# Patient Record
Sex: MALE | Race: BLACK OR AFRICAN AMERICAN | Employment: UNEMPLOYED | URBAN - METROPOLITAN AREA
[De-identification: names, ages, dates, MRNs, and addresses within clinical notes are randomized per-mention and may not be internally consistent; named-entity substitution may affect disease eponyms.]

---

## 2017-03-02 ENCOUNTER — HOSPITAL ENCOUNTER (EMERGENCY)
Age: 57
Discharge: HOME | End: 2017-03-02
Attending: EMERGENCY MEDICINE
Payer: COMMERCIAL

## 2017-03-02 ENCOUNTER — APPOINTMENT (OUTPATIENT)
Dept: GENERAL RADIOLOGY | Age: 57
End: 2017-03-02
Attending: EMERGENCY MEDICINE
Payer: COMMERCIAL

## 2017-03-02 VITALS
SYSTOLIC BLOOD PRESSURE: 135 MMHG | HEIGHT: 73 IN | RESPIRATION RATE: 18 BRPM | OXYGEN SATURATION: 97 % | WEIGHT: 213 LBS | BODY MASS INDEX: 28.23 KG/M2 | TEMPERATURE: 98.5 F | DIASTOLIC BLOOD PRESSURE: 78 MMHG | HEART RATE: 78 BPM

## 2017-03-02 DIAGNOSIS — K29.90 GASTRITIS AND DUODENITIS: Primary | ICD-10-CM

## 2017-03-02 LAB
ALBUMIN SERPL BCP-MCNC: 3.6 G/DL (ref 3.5–5)
ALBUMIN/GLOB SERPL: 0.9 {RATIO} (ref 1.2–3.5)
ALP SERPL-CCNC: 63 U/L (ref 50–136)
ALT SERPL-CCNC: 36 U/L (ref 12–65)
ANION GAP BLD CALC-SCNC: 9 MMOL/L (ref 7–16)
AST SERPL W P-5'-P-CCNC: 28 U/L (ref 15–37)
BASOPHILS # BLD AUTO: 0 K/UL (ref 0–0.2)
BASOPHILS # BLD: 0 % (ref 0–2)
BILIRUB SERPL-MCNC: 0.5 MG/DL (ref 0.2–1.1)
BNP SERPL-MCNC: 7 PG/ML
BUN SERPL-MCNC: 22 MG/DL (ref 6–23)
CALCIUM SERPL-MCNC: 9.1 MG/DL (ref 8.3–10.4)
CHLORIDE SERPL-SCNC: 107 MMOL/L (ref 98–107)
CO2 SERPL-SCNC: 28 MMOL/L (ref 21–32)
CREAT SERPL-MCNC: 1.45 MG/DL (ref 0.8–1.5)
DIFFERENTIAL METHOD BLD: ABNORMAL
EOSINOPHIL # BLD: 0.2 K/UL (ref 0–0.8)
EOSINOPHIL NFR BLD: 2 % (ref 0.5–7.8)
ERYTHROCYTE [DISTWIDTH] IN BLOOD BY AUTOMATED COUNT: 14.2 % (ref 11.9–14.6)
GLOBULIN SER CALC-MCNC: 4.1 G/DL (ref 2.3–3.5)
GLUCOSE SERPL-MCNC: 84 MG/DL (ref 65–100)
HCT VFR BLD AUTO: 40.2 % (ref 41.1–50.3)
HGB BLD-MCNC: 13.8 G/DL (ref 13.6–17.2)
IMM GRANULOCYTES # BLD: 0 K/UL (ref 0–0.5)
IMM GRANULOCYTES NFR BLD AUTO: 0.3 % (ref 0–5)
LIPASE SERPL-CCNC: 168 U/L (ref 73–393)
LYMPHOCYTES # BLD AUTO: 38 % (ref 13–44)
LYMPHOCYTES # BLD: 2.6 K/UL (ref 0.5–4.6)
MCH RBC QN AUTO: 29.7 PG (ref 26.1–32.9)
MCHC RBC AUTO-ENTMCNC: 34.3 G/DL (ref 31.4–35)
MCV RBC AUTO: 86.5 FL (ref 79.6–97.8)
MONOCYTES # BLD: 0.5 K/UL (ref 0.1–1.3)
MONOCYTES NFR BLD AUTO: 8 % (ref 4–12)
NEUTS SEG # BLD: 3.6 K/UL (ref 1.7–8.2)
NEUTS SEG NFR BLD AUTO: 52 % (ref 43–78)
PLATELET # BLD AUTO: 248 K/UL (ref 150–450)
PMV BLD AUTO: 9.5 FL (ref 10.8–14.1)
POTASSIUM SERPL-SCNC: 3.4 MMOL/L (ref 3.5–5.1)
PROT SERPL-MCNC: 7.7 G/DL (ref 6.3–8.2)
RBC # BLD AUTO: 4.65 M/UL (ref 4.23–5.67)
SODIUM SERPL-SCNC: 144 MMOL/L (ref 136–145)
TROPONIN I BLD-MCNC: 0 NG/ML (ref 0–0.08)
TROPONIN I SERPL-MCNC: <0.02 NG/ML (ref 0.02–0.05)
WBC # BLD AUTO: 6.9 K/UL (ref 4.3–11.1)

## 2017-03-02 PROCEDURE — 83880 ASSAY OF NATRIURETIC PEPTIDE: CPT | Performed by: EMERGENCY MEDICINE

## 2017-03-02 PROCEDURE — 85025 COMPLETE CBC W/AUTO DIFF WBC: CPT | Performed by: EMERGENCY MEDICINE

## 2017-03-02 PROCEDURE — 99285 EMERGENCY DEPT VISIT HI MDM: CPT | Performed by: EMERGENCY MEDICINE

## 2017-03-02 PROCEDURE — 71020 XR CHEST PA LAT: CPT

## 2017-03-02 PROCEDURE — 84484 ASSAY OF TROPONIN QUANT: CPT | Performed by: EMERGENCY MEDICINE

## 2017-03-02 PROCEDURE — 74011000250 HC RX REV CODE- 250: Performed by: EMERGENCY MEDICINE

## 2017-03-02 PROCEDURE — 74011250637 HC RX REV CODE- 250/637: Performed by: EMERGENCY MEDICINE

## 2017-03-02 PROCEDURE — 93005 ELECTROCARDIOGRAM TRACING: CPT | Performed by: EMERGENCY MEDICINE

## 2017-03-02 PROCEDURE — 80053 COMPREHEN METABOLIC PANEL: CPT | Performed by: EMERGENCY MEDICINE

## 2017-03-02 PROCEDURE — 83690 ASSAY OF LIPASE: CPT | Performed by: EMERGENCY MEDICINE

## 2017-03-02 RX ORDER — PANTOPRAZOLE SODIUM 40 MG/1
40 TABLET, DELAYED RELEASE ORAL DAILY
Qty: 20 TAB | Refills: 0 | Status: SHIPPED | OUTPATIENT
Start: 2017-03-02 | End: 2017-03-13 | Stop reason: SDUPTHER

## 2017-03-02 RX ORDER — GUAIFENESIN 100 MG/5ML
324 LIQUID (ML) ORAL
Status: COMPLETED | OUTPATIENT
Start: 2017-03-02 | End: 2017-03-02

## 2017-03-02 RX ORDER — LIDOCAINE HYDROCHLORIDE 20 MG/ML
15 SOLUTION OROPHARYNGEAL
Status: COMPLETED | OUTPATIENT
Start: 2017-03-02 | End: 2017-03-02

## 2017-03-02 RX ADMIN — ASPIRIN 81 MG 324 MG: 81 TABLET ORAL at 19:20

## 2017-03-02 RX ADMIN — Medication 30 ML: at 19:15

## 2017-03-02 RX ADMIN — LIDOCAINE HYDROCHLORIDE 15 ML: 20 SOLUTION ORAL; TOPICAL at 19:15

## 2017-03-02 NOTE — ED TRIAGE NOTES
Pt reports onset of chest pain was last Saturday night. Pt describes pain as a gas like pressure. Pt has not taken any nitro or asa today.

## 2017-03-03 LAB
ATRIAL RATE: 76 BPM
CALCULATED P AXIS, ECG09: 55 DEGREES
CALCULATED R AXIS, ECG10: 74 DEGREES
CALCULATED T AXIS, ECG11: 69 DEGREES
DIAGNOSIS, 93000: NORMAL
DIASTOLIC BP, ECG02: NORMAL MMHG
P-R INTERVAL, ECG05: 150 MS
Q-T INTERVAL, ECG07: 380 MS
QRS DURATION, ECG06: 100 MS
QTC CALCULATION (BEZET), ECG08: 427 MS
SYSTOLIC BP, ECG01: NORMAL MMHG
VENTRICULAR RATE, ECG03: 76 BPM

## 2017-03-03 NOTE — ED PROVIDER NOTES
HPI Comments: Patient with prior drug abuse and history of high blood pressure and congestive heart failure. Takes Lasix. Presents with epigastric pain since Saturday. Off and on but constant since this morning at 8:30. Worse at 11 and is eased up since then. No shortness of breath nausea numbness or diaphoresis. Patient is a 55 y.o. male presenting with chest pain. The history is provided by the patient. No  was used. Chest Pain (Angina)    This is a new problem. The current episode started more than 2 days ago. The problem has not changed since onset. The problem occurs constantly. The pain is associated with normal activity. The pain is present in the epigastric region. The pain is mild. The quality of the pain is described as pressure-like, tightness and dull. The pain does not radiate. Associated symptoms include abdominal pain. Pertinent negatives include no back pain, no cough, no diaphoresis, no dizziness, no exertional chest pressure, no fever, no headaches, no irregular heartbeat, no leg pain, no lower extremity edema, no malaise/fatigue, no nausea, no near-syncope, no numbness, no palpitations, no shortness of breath, no vomiting and no weakness. He has tried nothing for the symptoms. Risk factors include hypertension. His past medical history is significant for HTN. Past Medical History:   Diagnosis Date    CHF (congestive heart failure) ([facility] [address] 7/20/2014    with acute/chronic systolic CHF in face of malignant hypertension and polysubstance abuse to include alcohol, cocaine    CHF (congestive heart failure) ([facility] [address] 2/20/15    echo: LVEF 50-55%, mild to mod . MR    Chronic kidney disease     stage III per cardiology ov note 2/25/15 (cr. 1.5)    Diverticulosis of large intestine without diverticulitis 2016    History of alcohol abuse 7/22/2014    History of drug abuse 7/22/14    History of echocardiogram echo: 2/20/15    LVEF 50-55%, mild to mod.  MR, mild to moderate concentric LV hypertrophy.  Hx of colonic polyps 2016    Hypertension        Past Surgical History:   Procedure Laterality Date    HX COLONOSCOPY  last 2/8/16    Reina--five small sigmoid polyps--5-7 year recall         Family History:   Problem Relation Age of Onset    Alcohol abuse Mother     Heart Attack Other     Heart Disease Brother 54       Social History     Social History    Marital status:      Spouse name: N/A    Number of children: N/A    Years of education: N/A     Occupational History    Not on file. Social History Main Topics    Smoking status: Never Smoker    Smokeless tobacco: Never Used    Alcohol use No      Comment:  has not drank alcohol since July 2014    Drug use: Yes     Special: Cocaine      Comment: not used cocaine since July 2014    Sexual activity: Not on file     Other Topics Concern    Not on file     Social History Narrative         ALLERGIES: Review of patient's allergies indicates no known allergies. Review of Systems   Constitutional: Negative for chills, diaphoresis, fever and malaise/fatigue. HENT: Negative for rhinorrhea and sore throat. Eyes: Negative for pain and redness. Respiratory: Negative for cough, chest tightness, shortness of breath and wheezing. Cardiovascular: Positive for chest pain. Negative for palpitations, leg swelling and near-syncope. Gastrointestinal: Positive for abdominal pain. Negative for diarrhea, nausea and vomiting. Genitourinary: Negative for dysuria and hematuria. Musculoskeletal: Negative for back pain, gait problem, neck pain and neck stiffness. Skin: Negative for color change and rash. Neurological: Negative for dizziness, weakness, numbness and headaches. Vitals:    03/02/17 1606   BP: 119/80   Pulse: 80   Resp: 18   SpO2: 97%   Weight: 96.6 kg (213 lb)   Height: 6' 1\" (1.854 m)            Physical Exam   Constitutional: He is oriented to person, place, and time.  He appears well-developed and well-nourished. No distress. HENT:   Head: Normocephalic and atraumatic. Neck: Normal range of motion. Neck supple. Cardiovascular: Normal rate and regular rhythm. No murmur heard. Pulmonary/Chest: Effort normal and breath sounds normal. He has no wheezes. He exhibits no tenderness. Abdominal: Soft. Bowel sounds are normal. There is no tenderness. Musculoskeletal: Normal range of motion. He exhibits no edema. Neurological: He is alert and oriented to person, place, and time. Skin: Skin is warm and dry. Nursing note and vitals reviewed. MDM  Number of Diagnoses or Management Options  Diagnosis management comments: Pt feels better with meds. Will discharge. Amount and/or Complexity of Data Reviewed  Clinical lab tests: ordered and reviewed  Tests in the radiology section of CPT®: ordered and reviewed  Tests in the medicine section of CPT®: ordered and reviewed    Patient Progress  Patient progress: stable    ED Course       Procedures    EKG: normal sinus rhythm, nonspecific ST and T waves changes. Rate 76. XR CHEST PA LAT (Final result) Result time: 03/02/17 17:22:11     Final result by Rosalba Wilkinson DO (03/02/17 17:22:11)     Impression:     Impression: No active disease in the chest.           Narrative:     Two view chest:  03/02/2017    History: Chest pains intermittently x5 days. Comparison: 07/20/2014    Findings: The heart and mediastinal silhouette are normal in size and  configuration. The lungs and pleural spaces are clear. The pulmonary vascularity  is within normal limits.  The visualized osseous structures are unremarkable.                     Results Include:    Recent Results (from the past 24 hour(s))   EKG, 12 LEAD, INITIAL    Collection Time: 03/02/17  4:13 PM   Result Value Ref Range    Systolic BP  mmHg    Diastolic BP  mmHg    Ventricular Rate 76 BPM    Atrial Rate 76 BPM    P-R Interval 150 ms    QRS Duration 100 ms    Q-T Interval 380 ms    QTC Calculation (Bezet) 427 ms    Calculated P Axis 55 degrees    Calculated R Axis 74 degrees    Calculated T Axis 69 degrees    Diagnosis       Normal sinus rhythm  Normal ECG  When compared with ECG of 20-JUL-2014 19:12,  Non-specific change in ST segment in Anterior leads  Nonspecific T wave abnormality no longer evident in Anterolateral leads  QT has shortened     CBC WITH AUTOMATED DIFF    Collection Time: 03/02/17  4:20 PM   Result Value Ref Range    WBC 6.9 4.3 - 11.1 K/uL    RBC 4.65 4.23 - 5.67 M/uL    HGB 13.8 13.6 - 17.2 g/dL    HCT 40.2 (L) 41.1 - 50.3 %    MCV 86.5 79.6 - 97.8 FL    MCH 29.7 26.1 - 32.9 PG    MCHC 34.3 31.4 - 35.0 g/dL    RDW 14.2 11.9 - 14.6 %    PLATELET 952 954 - 935 K/uL    MPV 9.5 (L) 10.8 - 14.1 FL    DF AUTOMATED      NEUTROPHILS 52 43 - 78 %    LYMPHOCYTES 38 13 - 44 %    MONOCYTES 8 4.0 - 12.0 %    EOSINOPHILS 2 0.5 - 7.8 %    BASOPHILS 0 0.0 - 2.0 %    IMMATURE GRANULOCYTES 0.3 0.0 - 5.0 %    ABS. NEUTROPHILS 3.6 1.7 - 8.2 K/UL    ABS. LYMPHOCYTES 2.6 0.5 - 4.6 K/UL    ABS. MONOCYTES 0.5 0.1 - 1.3 K/UL    ABS. EOSINOPHILS 0.2 0.0 - 0.8 K/UL    ABS. BASOPHILS 0.0 0.0 - 0.2 K/UL    ABS. IMM. GRANS. 0.0 0.0 - 0.5 K/UL   METABOLIC PANEL, COMPREHENSIVE    Collection Time: 03/02/17  4:20 PM   Result Value Ref Range    Sodium 144 136 - 145 mmol/L    Potassium 3.4 (L) 3.5 - 5.1 mmol/L    Chloride 107 98 - 107 mmol/L    CO2 28 21 - 32 mmol/L    Anion gap 9 7 - 16 mmol/L    Glucose 84 65 - 100 mg/dL    BUN 22 6 - 23 MG/DL    Creatinine 1.45 0.8 - 1.5 MG/DL    GFR est AA >60 >60 ml/min/1.73m2    GFR est non-AA 54 (L) >60 ml/min/1.73m2    Calcium 9.1 8.3 - 10.4 MG/DL    Bilirubin, total 0.5 0.2 - 1.1 MG/DL    ALT (SGPT) 36 12 - 65 U/L    AST (SGOT) 28 15 - 37 U/L    Alk.  phosphatase 63 50 - 136 U/L    Protein, total 7.7 6.3 - 8.2 g/dL    Albumin 3.6 3.5 - 5.0 g/dL    Globulin 4.1 (H) 2.3 - 3.5 g/dL    A-G Ratio 0.9 (L) 1.2 - 3.5     TROPONIN I    Collection Time: 03/02/17  4:20 PM   Result Value Ref Range    Troponin-I, Qt. <0.02 (L) 0.02 - 0.05 NG/ML   BNP    Collection Time: 03/02/17  4:20 PM   Result Value Ref Range    BNP 7 pg/mL   LIPASE    Collection Time: 03/02/17  4:20 PM   Result Value Ref Range    Lipase 168 73 - 393 U/L   POC TROPONIN-I    Collection Time: 03/02/17  7:35 PM   Result Value Ref Range    Troponin-I (POC) 0 0.0 - 0.08 ng/ml

## 2017-03-03 NOTE — DISCHARGE INSTRUCTIONS
Gastritis: Care Instructions  Your Care Instructions    Gastritis is a sore and upset stomach. It happens when something irritates the stomach lining. Many things can cause it. These include an infection such as the flu or something you ate or drank. Medicines or a sore on the lining of the stomach (ulcer) also can cause it. Your belly may bloat and ache. You may belch, vomit, and feel sick to your stomach. You should be able to relieve the problem by taking medicine. And it may help to change your diet. If gastritis lasts, your doctor may prescribe medicine. Follow-up care is a key part of your treatment and safety. Be sure to make and go to all appointments, and call your doctor if you are having problems. It's also a good idea to know your test results and keep a list of the medicines you take. How can you care for yourself at home? · If your doctor prescribed antibiotics, take them as directed. Do not stop taking them just because you feel better. You need to take the full course of antibiotics. · Be safe with medicines. If your doctor prescribed medicine to decrease stomach acid, take it as directed. Call your doctor if you think you are having a problem with your medicine. · Do not take any other medicine, including over-the-counter pain relievers, without talking to your doctor first.  · If your doctor recommends over-the-counter medicine to reduce stomach acid, such as Pepcid AC, Prilosec, Tagamet HB, or Zantac 75, follow the directions on the label. · Drink plenty of fluids (enough so that your urine is light yellow or clear like water) to prevent dehydration. Choose water and other caffeine-free clear liquids. If you have kidney, heart, or liver disease and have to limit fluids, talk with your doctor before you increase the amount of fluids you drink. · Limit how much alcohol you drink. · Avoid coffee, tea, cola drinks, chocolate, and other foods with caffeine.  They increase stomach acid.  When should you call for help? Call 911 anytime you think you may need emergency care. For example, call if:  · You vomit blood or what looks like coffee grounds. · You pass maroon or very bloody stools. Call your doctor now or seek immediate medical care if:  · You start breathing fast and have not produced urine in the last 8 hours. · You cannot keep fluids down. Watch closely for changes in your health, and be sure to contact your doctor if:  · You do not get better as expected. Where can you learn more? Go to http://yolanda-gwyn.info/. Enter [id number] in the search box to learn more about \"Gastritis: Care Instructions. \"  Current as of: August 9, 2016  Content Version: 11.1  © 3155-8604 [employer]. Care instructions adapted under license by [employer] (which disclaims liability or warranty for this information). If you have questions about a medical condition or this instruction, always ask your healthcare professional. [address] any warranty or liability for your use of this information.

## 2017-03-03 NOTE — ED NOTES
Pt discharged instructions given, pt v\u to instructions. Pt given 1 rx and medication was explained to the pt, who v\u. Pt ambulatory on discharge in no acute distress.

## 2017-10-09 ENCOUNTER — HOSPITAL ENCOUNTER (OUTPATIENT)
Dept: GENERAL RADIOLOGY | Age: 57
Discharge: HOME | End: 2017-10-09
Attending: FAMILY MEDICINE
Payer: COMMERCIAL

## 2017-10-09 DIAGNOSIS — M79.671 RIGHT FOOT PAIN: ICD-10-CM

## 2017-10-09 PROBLEM — Z98.890 S/P COLONOSCOPY: Chronic | Status: ACTIVE | Noted: 2017-10-09

## 2017-10-09 PROBLEM — Z98.890 S/P COLONOSCOPY: Status: ACTIVE | Noted: 2017-10-09

## 2017-10-09 PROCEDURE — 73630 X-RAY EXAM OF FOOT: CPT

## 2017-10-09 PROCEDURE — 73610 X-RAY EXAM OF ANKLE: CPT

## 2017-10-09 NOTE — PROGRESS NOTES
Please let patient know his X-rays of his foot show some arthritic type changes but no broken bones and no dislocation of the bones.

## 2018-02-27 ENCOUNTER — HOSPITAL ENCOUNTER (EMERGENCY)
Age: 58
Discharge: HOME | End: 2018-02-28
Attending: EMERGENCY MEDICINE
Payer: COMMERCIAL

## 2018-02-27 ENCOUNTER — APPOINTMENT (OUTPATIENT)
Dept: GENERAL RADIOLOGY | Age: 58
End: 2018-02-27
Payer: COMMERCIAL

## 2018-02-27 VITALS
BODY MASS INDEX: 27.36 KG/M2 | TEMPERATURE: 98.8 F | HEIGHT: 72 IN | DIASTOLIC BLOOD PRESSURE: 86 MMHG | SYSTOLIC BLOOD PRESSURE: 135 MMHG | OXYGEN SATURATION: 98 % | HEART RATE: 73 BPM | WEIGHT: 202 LBS | RESPIRATION RATE: 16 BRPM

## 2018-02-27 DIAGNOSIS — M25.571 ACUTE RIGHT ANKLE PAIN: Primary | ICD-10-CM

## 2018-02-27 DIAGNOSIS — M10.9 ACUTE GOUT OF RIGHT ANKLE, UNSPECIFIED CAUSE: ICD-10-CM

## 2018-02-27 PROCEDURE — 74011636637 HC RX REV CODE- 636/637: Performed by: PHYSICIAN ASSISTANT

## 2018-02-27 PROCEDURE — 73610 X-RAY EXAM OF ANKLE: CPT

## 2018-02-27 PROCEDURE — 74011250637 HC RX REV CODE- 250/637: Performed by: PHYSICIAN ASSISTANT

## 2018-02-27 PROCEDURE — 99283 EMERGENCY DEPT VISIT LOW MDM: CPT | Performed by: PHYSICIAN ASSISTANT

## 2018-02-27 RX ORDER — COLCHICINE 0.6 MG/1
1.2 TABLET ORAL
Status: COMPLETED | OUTPATIENT
Start: 2018-02-27 | End: 2018-02-27

## 2018-02-27 RX ORDER — HYDROCODONE BITARTRATE AND ACETAMINOPHEN 5; 325 MG/1; MG/1
1 TABLET ORAL
Status: COMPLETED | OUTPATIENT
Start: 2018-02-27 | End: 2018-02-27

## 2018-02-27 RX ADMIN — HYDROCODONE BITARTRATE AND ACETAMINOPHEN 1 TABLET: 5; 325 TABLET ORAL at 23:34

## 2018-02-27 RX ADMIN — COLCHICINE 1.2 MG: 0.6 TABLET, FILM COATED ORAL at 00:40

## 2018-02-27 RX ADMIN — PREDNISONE 60 MG: 50 TABLET ORAL at 23:34

## 2018-02-28 RX ORDER — HYDROCODONE BITARTRATE AND ACETAMINOPHEN 5; 325 MG/1; MG/1
1 TABLET ORAL
Qty: 10 TAB | Refills: 0 | Status: SHIPPED | OUTPATIENT
Start: 2018-02-28 | End: 2018-04-16 | Stop reason: ALTCHOICE

## 2018-02-28 RX ORDER — PREDNISONE 10 MG/1
TABLET ORAL
Qty: 48 TAB | Refills: 0 | Status: SHIPPED | OUTPATIENT
Start: 2018-02-28 | End: 2018-04-16 | Stop reason: ALTCHOICE

## 2018-02-28 NOTE — DISCHARGE INSTRUCTIONS
Gout: Care Instructions  Your Care Instructions    Gout is a form of arthritis caused by a buildup of uric acid crystals in a joint. It causes sudden attacks of pain, swelling, redness, and stiffness, usually in one joint, especially the big toe. Gout usually comes on without a cause. But it can be brought on by drinking alcohol (especially beer) or eating seafood and red meat. Taking certain medicines, such as diuretics or aspirin, also can bring on an attack of gout. Taking your medicines as prescribed and following up with your doctor regularly can help you avoid gout attacks in the future. Follow-up care is a key part of your treatment and safety. Be sure to make and go to all appointments, and call your doctor if you are having problems. It's also a good idea to know your test results and keep a list of the medicines you take. How can you care for yourself at home? · If the joint is swollen, put ice or a cold pack on the area for 10 to 20 minutes at a time. Put a thin cloth between the ice and your skin. · Prop up the sore limb on a pillow when you ice it or anytime you sit or lie down during the next 3 days. Try to keep it above the level of your heart. This will help reduce swelling. · Rest sore joints. Avoid activities that put weight or strain on the joints for a few days. Take short rest breaks from your regular activities during the day. · Take your medicines exactly as prescribed. Call your doctor if you think you are having a problem with your medicine. · Take pain medicines exactly as directed. ¨ If the doctor gave you a prescription medicine for pain, take it as prescribed. ¨ If you are not taking a prescription pain medicine, ask your doctor if you can take an over-the-counter medicine. · Eat less seafood and red meat. · Check with your doctor before drinking alcohol. · Losing weight, if you are overweight, may help reduce attacks of gout. But do not go on a [employer]. \" Losing a lot of weight in a short amount of time can cause a gout attack. When should you call for help? Call your doctor now or seek immediate medical care if:  ? · You have a fever. ? · The joint is so painful you cannot use it. ? · You have sudden, unexplained swelling, redness, warmth, or severe pain in one or more joints. ? Watch closely for changes in your health, and be sure to contact your doctor if:  ? · You have joint pain. ? · Your symptoms get worse or are not improving after 2 or 3 days. Where can you learn more? Go to http://yolanda-gwyn.info/. Enter C564 in the search box to learn more about \"Gout: Care Instructions. \"  Current as of: October 31, 2016  Content Version: 11.4  © 0788-5420 [employer]. Care instructions adapted under license by [employer] (which disclaims liability or warranty for this information). If you have questions about a medical condition or this instruction, always ask your healthcare professional. [place] [address] any warranty or liability for your use of this information. Purine-Restricted Diet: Care Instructions  Your Care Instructions    Purines are substances that are found in some foods. Your body turns purines into uric acid. High levels of uric acid can cause gout, which is a form of arthritis that causes pain and inflammation in joints. You may be able to help control the amount of uric acid in your body by limiting high-purine foods in your diet. Follow-up care is a key part of your treatment and safety. Be sure to make and go to all appointments, and call your doctor if you are having problems. It's also a good idea to know your test results and keep a list of the medicines you take. How can you care for yourself at home? · Plan your meals and snacks around foods that are low in purines and are safe for you to eat.  These foods include:  ¨ Green vegetables and tomatoes. ¨ Fruits. ¨ Whole-grain breads, rice, and cereals. ¨ Eggs, peanut butter, and nuts. ¨ Low-fat milk, cheese, and other milk products. ¨ Popcorn. ¨ Gelatin desserts, chocolate, cocoa, and cakes and sweets, in small amounts. · You can eat certain foods that are medium-high in purines, but eat them only once in a while. These foods include:  ¨ Legumes, such as dried beans and dried peas. You can have 1 cup cooked legumes each day. ¨ Asparagus, cauliflower, spinach, mushrooms, and green peas. ¨ Fish and seafood (other than very high-purine seafood). ¨ Oatmeal, wheat bran, and wheat germ. · Limit very high-purine foods, including:  ¨ Organ meats, such as liver, kidneys, sweetbreads, and brains. ¨ Meats, including denson, beef, pork, and lamb. ¨ Game meats and any other meats in large amounts. ¨ Anchovies, sardines, herring, mackerel, and scallops. ¨ Gravy. ¨ Beer. Where can you learn more? Go to http://yolandaMaidou International.info/. Enter F448 in the search box to learn more about \"Purine-Restricted Diet: Care Instructions. \"  Current as of: May 12, 2017  Content Version: 11.4  © 9814-5506 [employer]. Care instructions adapted under license by [employer] (which disclaims liability or warranty for this information). If you have questions about a medical condition or this instruction, always ask your healthcare professional. [place] [address] any warranty or liability for your use of this information. Joint Pain: Care Instructions  Your Care Instructions    Many people have small aches and pains from overuse or injury to muscles and joints. Joint injuries often happen during sports or recreation, work tasks, or projects around the home. An overuse injury can happen when you put too much stress on a joint or when you do an activity that stresses the joint over and over, such as using the computer or rowing a boat.   You can take action at home to help your muscles and joints get better. You should feel better in 1 to 2 weeks, but it can take 3 months or more to heal completely. Follow-up care is a key part of your treatment and safety. Be sure to make and go to all appointments, and call your doctor if you are having problems. It's also a good idea to know your test results and keep a list of the medicines you take. How can you care for yourself at home? · Do not put weight on the injured joint for at least a day or two. · For the first day or two after an injury, do not take hot showers or baths, and do not use hot packs. The heat could make swelling worse. · Put ice or a cold pack on the sore joint for 10 to 20 minutes at a time. Try to do this every 1 to 2 hours for the next 3 days (when you are awake) or until the swelling goes down. Put a thin cloth between the ice and your skin. · Wrap the injury in an elastic bandage. Do not wrap it too tightly because this can cause more swelling. · Prop up the sore joint on a pillow when you ice it or anytime you sit or lie down during the next 3 days. Try to keep it above the level of your heart. This will help reduce swelling. · Take an over-the-counter pain medicine, such as acetaminophen (Tylenol), ibuprofen (Advil, Motrin), or naproxen (Aleve). Read and follow all instructions on the label. · After 1 or 2 days of rest, begin moving the joint gently. While the joint is still healing, you can begin to exercise using activities that do not strain or hurt the painful joint. When should you call for help? Call your doctor now or seek immediate medical care if:  ? · You have signs of infection, such as:  ¨ Increased pain, swelling, warmth, and redness. ¨ Red streaks leading from the joint. ¨ A fever. ? Watch closely for changes in your health, and be sure to contact your doctor if:  ? · Your movement or symptoms are not getting better after 1 to 2 weeks of home treatment.    Where can you learn more?  Go to http://yolanda-gwyn.info/. Enter P205 in the search box to learn more about \"Joint Pain: Care Instructions. \"  Current as of: March 21, 2017  Content Version: 11.4  © 4893-4466 [employer]. Care instructions adapted under license by [employer] (which disclaims liability or warranty for this information). If you have questions about a medical condition or this instruction, always ask your healthcare professional. [address] any warranty or liability for your use of this information.

## 2018-02-28 NOTE — ED PROVIDER NOTES
HPI Comments: 60-year-old -American male presents with atraumatic right ankle pain which started yesterday. 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 He states that he ate a lot of seafood 2 days ago prior to onset of pain. Patient denies any numbness or tingling of right foot or ankle. Patient states right ankle area is extremely tender to palpation. Patient is a 65 y.o. male presenting with ankle pain. The history is provided by the patient. Ankle Pain    This is a new problem. The current episode started yesterday. The problem occurs constantly. The problem has not changed since onset. The pain is present in the right ankle. The quality of the pain is described as sharp. The pain is at a severity of 10/10. The pain is severe. Associated symptoms include limited range of motion and stiffness. Pertinent negatives include no numbness, no tingling and no back pain. The symptoms are aggravated by activity, palpation and movement. Treatments tried: over-the-counter pain patches. The treatment provided mild relief. There has been no history of extremity trauma. Family history is significant for gout. Past Medical History:   Diagnosis Date    CHF (congestive heart failure) ([facility] 7/20/2014    with acute/chronic systolic CHF in face of malignant hypertension and polysubstance abuse to include alcohol, cocaine    CHF (congestive heart failure) ([facility] 2/20/15    echo: LVEF 50-55%, mild to mod . MR    Chronic kidney disease     stage III per cardiology ov note 2/25/15 (cr. 1.5)    Diverticulosis of large intestine without diverticulitis 2016    History of alcohol abuse 7/22/2014    History of drug abuse 7/22/14    History of echocardiogram echo: 2/20/15    LVEF 50-55%, mild to mod. MR, mild to moderate concentric LV hypertrophy.     Hx of colonic polyps 2016    Hypertension        Past Surgical History:   Procedure Laterality Date    HX COLONOSCOPY  last 2/8/16 Reina--five small sigmoid polyps--5-7 year recall         Family History:   Problem Relation Age of Onset    Alcohol abuse Mother     Heart Attack Other     Heart Disease Brother 54       Social History     Social History    Marital status:      Spouse name: N/A    Number of children: N/A    Years of education: N/A     Occupational History    Not on file. Social History Main Topics    Smoking status: Never Smoker    Smokeless tobacco: Never Used    Alcohol use No      Comment:  has not drank alcohol since July 2014    Drug use: Yes     Special: Cocaine      Comment: not used cocaine since July 2014    Sexual activity: Not on file     Other Topics Concern    Not on file     Social History Narrative         ALLERGIES: Review of patient's allergies indicates no known allergies. Review of Systems   Constitutional: Negative. Negative for chills, diaphoresis, fatigue and fever. Respiratory: Negative. Cardiovascular: Negative. Gastrointestinal: Negative. Negative for nausea and vomiting. Musculoskeletal: Positive for arthralgias, gait problem, joint swelling and stiffness. Negative for back pain and myalgias. Skin: Negative. Neurological: Positive for dizziness and light-headedness. Negative for tingling, numbness and headaches. All other systems reviewed and are negative. Vitals:    02/27/18 2321   BP: 135/86   Pulse: 73   Resp: 16   Temp: 98.8 °F (37.1 °C)   SpO2: 98%   Weight: 91.6 kg (202 lb)   Height: 6' (1.829 m)            Physical Exam   Constitutional: He is oriented to person, place, and time. Vital signs are normal. He appears well-developed and well-nourished. He is active. Non-toxic appearance. He does not appear ill. No distress. Patient is sitting in wheelchair in no acute distress. Cardiovascular: Normal rate, regular rhythm and normal heart sounds. Exam reveals no gallop and no friction rub. No murmur heard.   Pulses:       Dorsalis pedis pulses are 2+ on the right side        Posterior tibial pulses are 2+ on the right side   Pulmonary/Chest: Effort normal and breath sounds normal. No accessory muscle usage. No respiratory distress. He has no decreased breath sounds. He has no wheezes. He has no rhonchi. Musculoskeletal:        Right knee: Normal. He exhibits normal range of motion. No tenderness found. Right ankle: He exhibits decreased range of motion and swelling. He exhibits no ecchymosis and no deformity. Tenderness. Lateral malleolus tenderness found. No head of 5th metatarsal and no proximal fibula tenderness found. Achilles tendon normal.        Right lower leg: Normal.        Right foot: Normal. There is normal capillary refill. No erythema or warmth. Neurological: He is alert and oriented to person, place, and time. Skin: Skin is warm and dry. Psychiatric: He has a normal mood and affect. His behavior is normal.   Nursing note and vitals reviewed. MDM  Number of Diagnoses or Management Options  Acute gout of right ankle, unspecified cause: established and improving  Acute right ankle pain: new and requires workup  Diagnosis management comments: Right ankle pain likely due to gout. Is atraumatic. Patient states he has had gout before and the pain was very much the same as it is now. He is administered Norco and prednisone for pain in the ER. Patient also received a dose of cultures seen 1.2 mg by mouth ×1. He is prescribed tapering dose of prednisone and Norco. Imaging of right ankle does not reveal any acute injury.        Amount and/or Complexity of Data Reviewed  Tests in the radiology section of CPT®: ordered and reviewed    Risk of Complications, Morbidity, and/or Mortality  Presenting problems: moderate  Diagnostic procedures: moderate  Management options: moderate    Patient Progress  Patient progress: stable        ED Course       Procedures      XR ANKLE RT MIN 3 V   Final Result   IMPRESSION: Lateral soft tissue swelling without evidence of acute fracture. Posterior calcaneal spur. I discussed the results of all labs, procedures, radiographs, and treatments with the patient and available family. Treatment plan is agreed upon and the patient is ready for discharge. Questions about treatment in the ED and differential diagnosis of presenting condition were answered. Patient was given verbal discharge instructions including, but not limited to, importance of returning to the emergency department for any concern of worsening or continued symptoms. Instructions were given to follow up with a primary care provider or specialist within 1-2 days. Adverse effects of medications, if prescribed, were discussed and patient was advised to refrain from significant physical activity until followed up by primary care physician and to not drive or operate heavy machinery after taking any sedating substances.

## 2018-02-28 NOTE — ED NOTES
I have reviewed discharge instructions with the patient. The patient verbalized understanding. Patient left ED via Discharge Method: ambulatory to Home with his spouse    Opportunity for questions and clarification provided. Patient given 2 scripts. To continue your aftercare when you leave the hospital, you may receive an automated call from our care team to check in on how you are doing. This is a free service and part of our promise to provide the best care and service to meet your aftercare needs.  If you have questions, or wish to unsubscribe from this service please call [phone]. Thank you for Choosing our [facility] Emergency Department.

## 2018-02-28 NOTE — ED TRIAGE NOTES
\"I dont know whats wrong with my right ankle. I put biofreeze on it and I havent hurt it, but I cant hardly walk on it at all. ...   I worked on it today\"

## 2018-04-16 PROBLEM — M1A.0710 IDIOPATHIC CHRONIC GOUT OF RIGHT ANKLE: Chronic | Status: ACTIVE | Noted: 2018-04-16

## 2018-11-29 ENCOUNTER — HOSPITAL ENCOUNTER (EMERGENCY)
Age: 58
Discharge: HOME | End: 2018-11-30
Payer: COMMERCIAL

## 2018-11-29 ENCOUNTER — APPOINTMENT (OUTPATIENT)
Dept: CT IMAGING | Age: 58
End: 2018-11-29
Payer: COMMERCIAL

## 2018-11-29 DIAGNOSIS — R10.84 ABDOMINAL PAIN, GENERALIZED: Primary | ICD-10-CM

## 2018-11-29 DIAGNOSIS — K57.92 DIVERTICULITIS: ICD-10-CM

## 2018-11-29 LAB
ALBUMIN SERPL-MCNC: 3.4 G/DL (ref 3.5–5)
ALBUMIN/GLOB SERPL: 0.7 {RATIO} (ref 1.2–3.5)
ALP SERPL-CCNC: 63 U/L (ref 50–136)
ALT SERPL-CCNC: 51 U/L (ref 12–65)
ANION GAP SERPL CALC-SCNC: 7 MMOL/L (ref 7–16)
AST SERPL-CCNC: 37 U/L (ref 15–37)
BACTERIA URNS QL MICRO: 0 /HPF
BASOPHILS # BLD: 0 K/UL (ref 0–0.2)
BASOPHILS NFR BLD: 0 % (ref 0–2)
BILIRUB SERPL-MCNC: 0.8 MG/DL (ref 0.2–1.1)
BUN SERPL-MCNC: 15 MG/DL (ref 6–23)
CALCIUM SERPL-MCNC: 9 MG/DL (ref 8.3–10.4)
CASTS URNS QL MICRO: 0 /LPF
CHLORIDE SERPL-SCNC: 103 MMOL/L (ref 98–107)
CO2 SERPL-SCNC: 28 MMOL/L (ref 21–32)
CREAT SERPL-MCNC: 1.31 MG/DL (ref 0.8–1.5)
DIFFERENTIAL METHOD BLD: ABNORMAL
EOSINOPHIL # BLD: 0 K/UL (ref 0–0.8)
EOSINOPHIL NFR BLD: 0 % (ref 0.5–7.8)
EPI CELLS #/AREA URNS HPF: 0 /HPF
ERYTHROCYTE [DISTWIDTH] IN BLOOD BY AUTOMATED COUNT: 14.1 % (ref 11.9–14.6)
GLOBULIN SER CALC-MCNC: 4.9 G/DL (ref 2.3–3.5)
GLUCOSE SERPL-MCNC: 156 MG/DL (ref 65–100)
HCT VFR BLD AUTO: 39.1 % (ref 41.1–50.3)
HGB BLD-MCNC: 13 G/DL (ref 13.6–17.2)
IMM GRANULOCYTES # BLD: 0.1 K/UL (ref 0–0.5)
IMM GRANULOCYTES NFR BLD AUTO: 1 % (ref 0–5)
LACTATE BLD-SCNC: 1.46 MMOL/L (ref 0.5–1.9)
LIPASE SERPL-CCNC: 142 U/L (ref 73–393)
LYMPHOCYTES # BLD: 1.9 K/UL (ref 0.5–4.6)
LYMPHOCYTES NFR BLD: 16 % (ref 13–44)
MCH RBC QN AUTO: 29.7 PG (ref 26.1–32.9)
MCHC RBC AUTO-ENTMCNC: 33.2 G/DL (ref 31.4–35)
MCV RBC AUTO: 89.5 FL (ref 79.6–97.8)
MONOCYTES # BLD: 0.9 K/UL (ref 0.1–1.3)
MONOCYTES NFR BLD: 8 % (ref 4–12)
NEUTS SEG # BLD: 8.7 K/UL (ref 1.7–8.2)
NEUTS SEG NFR BLD: 75 % (ref 43–78)
NRBC # BLD: 0 K/UL (ref 0–0.2)
PLATELET # BLD AUTO: 224 K/UL (ref 150–450)
PMV BLD AUTO: 9.6 FL (ref 9.4–12.3)
POTASSIUM SERPL-SCNC: 3.3 MMOL/L (ref 3.5–5.1)
PROT SERPL-MCNC: 8.3 G/DL (ref 6.3–8.2)
RBC # BLD AUTO: 4.37 M/UL (ref 4.23–5.6)
RBC #/AREA URNS HPF: NORMAL /HPF
SODIUM SERPL-SCNC: 138 MMOL/L (ref 136–145)
WBC # BLD AUTO: 11.6 K/UL (ref 4.3–11.1)
WBC URNS QL MICRO: 0 /HPF

## 2018-11-29 PROCEDURE — 99285 EMERGENCY DEPT VISIT HI MDM: CPT

## 2018-11-29 PROCEDURE — 74011250636 HC RX REV CODE- 250/636

## 2018-11-29 PROCEDURE — 83605 ASSAY OF LACTIC ACID: CPT

## 2018-11-29 PROCEDURE — 85025 COMPLETE CBC W/AUTO DIFF WBC: CPT

## 2018-11-29 PROCEDURE — 83690 ASSAY OF LIPASE: CPT

## 2018-11-29 PROCEDURE — 81015 MICROSCOPIC EXAM OF URINE: CPT

## 2018-11-29 PROCEDURE — 74177 CT ABD & PELVIS W/CONTRAST: CPT

## 2018-11-29 PROCEDURE — 81003 URINALYSIS AUTO W/O SCOPE: CPT

## 2018-11-29 PROCEDURE — 80053 COMPREHEN METABOLIC PANEL: CPT

## 2018-11-29 RX ORDER — SODIUM CHLORIDE 0.9 % (FLUSH) 0.9 %
5-10 SYRINGE (ML) INJECTION EVERY 8 HOURS
Status: DISCONTINUED | OUTPATIENT
Start: 2018-11-29 | End: 2018-11-30 | Stop reason: HOSPADM

## 2018-11-29 RX ORDER — SODIUM CHLORIDE 0.9 % (FLUSH) 0.9 %
5-10 SYRINGE (ML) INJECTION AS NEEDED
Status: DISCONTINUED | OUTPATIENT
Start: 2018-11-29 | End: 2018-11-30 | Stop reason: HOSPADM

## 2018-11-29 RX ORDER — SODIUM CHLORIDE 0.9 % (FLUSH) 0.9 %
10 SYRINGE (ML) INJECTION
Status: COMPLETED | OUTPATIENT
Start: 2018-11-29 | End: 2018-11-30

## 2018-11-29 RX ORDER — SODIUM CHLORIDE 9 MG/ML
1000 INJECTION, SOLUTION INTRAVENOUS ONCE
Status: COMPLETED | OUTPATIENT
Start: 2018-11-29 | End: 2018-11-30

## 2018-11-29 RX ADMIN — SODIUM CHLORIDE 1000 ML: 900 INJECTION, SOLUTION INTRAVENOUS at 23:26

## 2018-11-29 NOTE — LETTER
[address] [facility] EMERGENCY DEPT [address] [address] 
[phone] Work/School Note Date: 11/29/2018 To Whom It May concern: 
 
Trey Brandt was seen and treated today in the emergency room by the following provider(s): 
Attending Provider: Maria L Story MD. Trey Brandt may return to work on 12/3/2018.  
 
Sincerely, 
 
 
 
 
Randell Watts RN

## 2018-11-30 VITALS
WEIGHT: 200 LBS | DIASTOLIC BLOOD PRESSURE: 75 MMHG | HEART RATE: 89 BPM | RESPIRATION RATE: 16 BRPM | OXYGEN SATURATION: 96 % | TEMPERATURE: 99.2 F | HEIGHT: 72 IN | BODY MASS INDEX: 27.09 KG/M2 | SYSTOLIC BLOOD PRESSURE: 127 MMHG

## 2018-11-30 PROCEDURE — 74011636320 HC RX REV CODE- 636/320

## 2018-11-30 PROCEDURE — 74011250636 HC RX REV CODE- 250/636

## 2018-11-30 PROCEDURE — 74011000258 HC RX REV CODE- 258

## 2018-11-30 PROCEDURE — 96375 TX/PRO/DX INJ NEW DRUG ADDON: CPT

## 2018-11-30 PROCEDURE — 96374 THER/PROPH/DIAG INJ IV PUSH: CPT

## 2018-11-30 RX ORDER — HYDROCODONE BITARTRATE AND ACETAMINOPHEN 7.5; 325 MG/1; MG/1
1 TABLET ORAL
Qty: 12 TAB | Refills: 0 | Status: SHIPPED | OUTPATIENT
Start: 2018-11-30 | End: 2019-01-14

## 2018-11-30 RX ORDER — ONDANSETRON 2 MG/ML
4 INJECTION INTRAMUSCULAR; INTRAVENOUS
Status: COMPLETED | OUTPATIENT
Start: 2018-11-30 | End: 2018-11-30

## 2018-11-30 RX ORDER — CIPROFLOXACIN 500 MG/1
500 TABLET ORAL 2 TIMES DAILY
Qty: 14 TAB | Refills: 0 | Status: SHIPPED | OUTPATIENT
Start: 2018-11-30 | End: 2018-12-07

## 2018-11-30 RX ORDER — METRONIDAZOLE 500 MG/1
500 TABLET ORAL 2 TIMES DAILY
Qty: 14 TAB | Refills: 0 | Status: SHIPPED | OUTPATIENT
Start: 2018-11-30 | End: 2018-12-07

## 2018-11-30 RX ORDER — DOCUSATE SODIUM 100 MG/1
100 CAPSULE, LIQUID FILLED ORAL 2 TIMES DAILY
Qty: 20 CAP | Refills: 0 | Status: SHIPPED | OUTPATIENT
Start: 2018-11-30 | End: 2019-01-14

## 2018-11-30 RX ORDER — MORPHINE SULFATE 10 MG/ML
4 INJECTION, SOLUTION INTRAMUSCULAR; INTRAVENOUS
Status: COMPLETED | OUTPATIENT
Start: 2018-11-30 | End: 2018-11-30

## 2018-11-30 RX ADMIN — IOPAMIDOL 100 ML: 755 INJECTION, SOLUTION INTRAVENOUS at 00:09

## 2018-11-30 RX ADMIN — MORPHINE SULFATE 4 MG: 10 INJECTION INTRAVENOUS at 01:24

## 2018-11-30 RX ADMIN — Medication 10 ML: at 00:09

## 2018-11-30 RX ADMIN — Medication 5 ML: at 00:32

## 2018-11-30 RX ADMIN — Medication 5 ML: at 01:27

## 2018-11-30 RX ADMIN — ONDANSETRON 4 MG: 2 INJECTION INTRAMUSCULAR; INTRAVENOUS at 01:25

## 2018-11-30 RX ADMIN — SODIUM CHLORIDE 100 ML: 900 INJECTION, SOLUTION INTRAVENOUS at 00:09

## 2018-11-30 NOTE — DISCHARGE INSTRUCTIONS

## 2018-11-30 NOTE — ED PROVIDER NOTES
58-year-old male complaining generalized abdominal pain and fever. Onset yesterday. Patient's had some diarrhea but none today. The history is provided by the patient. Abdominal Pain This is a new problem. The current episode started 2 days ago. The problem occurs constantly. The problem has not changed since onset. The pain is associated with an unknown factor. The pain is located in the generalized abdominal region. The quality of the pain is dull and aching. The pain is at a severity of 8/10. The pain is moderate. Associated symptoms include anorexia, diarrhea and nausea. Pertinent negatives include no belching and no vomiting. Past Medical History:  
Diagnosis Date  [facility]/20/2014  
 with acute/chronic systolic CHF in face of malignant hypertension and polysubstance abuse to include alcohol, cocaine  CHF (congestive heart failure) ([facility] 2/20/15  
 echo: LVEF 50-55%, mild to mod . MR  Chronic kidney disease   
 stage III per cardiology ov note 2/25/15 (cr. 1.5)  Diverticulosis of large intestine without diverticulitis 2016  History of alcohol abuse 7/22/2014  History of drug abuse 7/22/14  
 History of echocardiogram echo: 2/20/15 LVEF 50-55%, mild to mod. MR, mild to moderate concentric LV hypertrophy.  Hx of colonic polyps 2016  Hypertension Past Surgical History:  
Procedure Laterality Date  HX COLONOSCOPY  last 2/8/16 Reina--five small sigmoid polyps--5-7 year recall Family History:  
Problem Relation Age of Onset  Alcohol abuse Mother  Heart Attack Other  Heart Disease Brother 54 Social History Socioeconomic History  Marital status:  Spouse name: Not on file  Number of children: Not on file  Years of education: Not on file  Highest education level: Not on file Social Needs  Financial resource strain: Not on file  Food insecurity - worry: Not on file  Food insecurity - inability: Not on file  Transportation needs - medical: Not on file  Transportation needs - non-medical: Not on file Occupational History  Not on file Tobacco Use  Smoking status: Never Smoker  Smokeless tobacco: Never Used Substance and Sexual Activity  Alcohol use: No  
  Comment:  has not drank alcohol since July 2014  Drug use: Yes Types: Cocaine Comment: not used cocaine since July 2014  Sexual activity: Not on file Other Topics Concern  Not on file Social History Narrative  Not on file ALLERGIES: Patient has no known allergies. Review of Systems Constitutional: Negative. Negative for activity change. HENT: Negative. Eyes: Negative. Respiratory: Negative. Cardiovascular: Negative. Gastrointestinal: Positive for abdominal pain, anorexia, diarrhea and nausea. Negative for vomiting. Genitourinary: Negative. Musculoskeletal: Negative. Skin: Negative. Neurological: Negative. Psychiatric/Behavioral: Negative. All other systems reviewed and are negative. Vitals:  
 11/29/18 2041 11/29/18 2243 BP: 140/90 143/86 Pulse: (!) 105 99 Resp: 16 Temp: (!) 100.7 °F (38.2 °C) SpO2: 97% 96% Weight: 90.7 kg (200 lb) Height: 6' (1.829 m) Physical Exam  
Constitutional: He is oriented to person, place, and time. He appears well-developed and well-nourished. No distress. HENT:  
Head: Normocephalic and atraumatic. Right Ear: External ear normal.  
Left Ear: External ear normal.  
Nose: Nose normal.  
Eyes: Conjunctivae and EOM are normal. Pupils are equal, round, and reactive to light. Right eye exhibits no discharge. Left eye exhibits no discharge. No scleral icterus. Neck: Normal range of motion. Cardiovascular: Regular rhythm. Pulmonary/Chest: Effort normal and breath sounds normal. No stridor. No respiratory distress. He has no wheezes. He has no rales. Abdominal: Soft. Bowel sounds are normal. He exhibits no distension. There is no tenderness. Musculoskeletal: Normal range of motion. Neurological: He is alert and oriented to person, place, and time. He exhibits normal muscle tone. Coordination normal.  
Skin: Skin is warm and dry. No rash noted. Psychiatric: He has a normal mood and affect. His behavior is normal.  
  
 
MDM Number of Diagnoses or Management Options Abdominal pain, generalized:  
Diverticulitis:  
Diagnosis management comments: aassessment diverticulitis with ABSCESS or perforation. Plan antibiotics Cipro and Flagyl 7 days follow-up GI Amount and/or Complexity of Data Reviewed Clinical lab tests: ordered and reviewed Tests in the radiology section of CPT®: ordered and reviewed Tests in the medicine section of CPT®: reviewed and ordered Procedures

## 2018-11-30 NOTE — ED NOTES
I have reviewed discharge instructions with the patient. The patient verbalized understanding. Patient left ED via Discharge Method: ambulatory to Home with wife. Opportunity for questions and clarification provided. Patient given 4 scripts. To continue your aftercare when you leave the hospital, you may receive an automated call from our care team to check in on how you are doing. This is a free service and part of our promise to provide the best care and service to meet your aftercare needs.  If you have questions, or wish to unsubscribe from this service please call [phone]. Thank you for Choosing our [facility] Emergency Department.

## 2019-01-28 PROBLEM — G47.10 HYPERSOMNOLENCE: Status: ACTIVE | Noted: 2019-01-28

## 2019-01-28 PROBLEM — G47.00 PERSISTENT DISORDER OF INITIATING OR MAINTAINING SLEEP: Status: ACTIVE | Noted: 2019-01-28

## 2019-01-28 PROBLEM — R29.818 SUSPECTED SLEEP APNEA: Status: ACTIVE | Noted: 2019-01-28

## 2019-02-11 ENCOUNTER — HOSPITAL ENCOUNTER (OUTPATIENT)
Dept: SLEEP MEDICINE | Age: 59
Discharge: HOME | End: 2019-02-11
Payer: COMMERCIAL

## 2019-02-11 PROCEDURE — 95810 POLYSOM 6/> YRS 4/> PARAM: CPT

## 2019-02-19 ENCOUNTER — HOSPITAL ENCOUNTER (OUTPATIENT)
Dept: SLEEP MEDICINE | Age: 59
Discharge: HOME | End: 2019-02-19
Payer: COMMERCIAL

## 2019-02-19 PROCEDURE — 95811 POLYSOM 6/>YRS CPAP 4/> PARM: CPT

## 2019-06-17 PROBLEM — G47.10 HYPERSOMNOLENCE: Status: RESOLVED | Noted: 2019-01-28 | Resolved: 2019-06-17

## 2019-06-17 PROBLEM — G47.33 OSA (OBSTRUCTIVE SLEEP APNEA): Chronic | Status: ACTIVE | Noted: 2019-06-17

## 2019-06-17 PROBLEM — R29.818 SUSPECTED SLEEP APNEA: Status: RESOLVED | Noted: 2019-01-28 | Resolved: 2019-06-17

## 2019-12-08 ENCOUNTER — HOSPITAL ENCOUNTER (EMERGENCY)
Age: 59
Discharge: HOME | End: 2019-12-08
Attending: EMERGENCY MEDICINE
Payer: COMMERCIAL

## 2019-12-08 ENCOUNTER — APPOINTMENT (OUTPATIENT)
Dept: GENERAL RADIOLOGY | Age: 59
End: 2019-12-08
Attending: EMERGENCY MEDICINE
Payer: COMMERCIAL

## 2019-12-08 VITALS
TEMPERATURE: 98.6 F | DIASTOLIC BLOOD PRESSURE: 82 MMHG | HEART RATE: 72 BPM | OXYGEN SATURATION: 96 % | WEIGHT: 219 LBS | RESPIRATION RATE: 16 BRPM | SYSTOLIC BLOOD PRESSURE: 160 MMHG | BODY MASS INDEX: 29.03 KG/M2 | HEIGHT: 73 IN

## 2019-12-08 DIAGNOSIS — R05.9 COUGH: ICD-10-CM

## 2019-12-08 DIAGNOSIS — R09.81 NASAL CONGESTION: Primary | ICD-10-CM

## 2019-12-08 LAB
ANION GAP SERPL CALC-SCNC: 6 MMOL/L (ref 7–16)
ATRIAL RATE: 66 BPM
BASOPHILS # BLD: 0 K/UL (ref 0–0.2)
BASOPHILS NFR BLD: 1 % (ref 0–2)
BUN SERPL-MCNC: 26 MG/DL (ref 6–23)
CALCIUM SERPL-MCNC: 8.8 MG/DL (ref 8.3–10.4)
CALCULATED P AXIS, ECG09: 50 DEGREES
CALCULATED R AXIS, ECG10: 63 DEGREES
CALCULATED T AXIS, ECG11: 55 DEGREES
CHLORIDE SERPL-SCNC: 107 MMOL/L (ref 98–107)
CO2 SERPL-SCNC: 30 MMOL/L (ref 21–32)
CREAT SERPL-MCNC: 1.25 MG/DL (ref 0.8–1.5)
DIAGNOSIS, 93000: NORMAL
DIFFERENTIAL METHOD BLD: ABNORMAL
EOSINOPHIL # BLD: 0.3 K/UL (ref 0–0.8)
EOSINOPHIL NFR BLD: 5 % (ref 0.5–7.8)
ERYTHROCYTE [DISTWIDTH] IN BLOOD BY AUTOMATED COUNT: 14.6 % (ref 11.9–14.6)
FLUAV AG NPH QL IA: NEGATIVE
FLUBV AG NPH QL IA: NEGATIVE
GLUCOSE SERPL-MCNC: 103 MG/DL (ref 65–100)
HCT VFR BLD AUTO: 39.8 % (ref 41.1–50.3)
HGB BLD-MCNC: 13.3 G/DL (ref 13.6–17.2)
IMM GRANULOCYTES # BLD AUTO: 0 K/UL (ref 0–0.5)
IMM GRANULOCYTES NFR BLD AUTO: 0 % (ref 0–5)
LYMPHOCYTES # BLD: 1.8 K/UL (ref 0.5–4.6)
LYMPHOCYTES NFR BLD: 29 % (ref 13–44)
MCH RBC QN AUTO: 29.8 PG (ref 26.1–32.9)
MCHC RBC AUTO-ENTMCNC: 33.4 G/DL (ref 31.4–35)
MCV RBC AUTO: 89.2 FL (ref 79.6–97.8)
MONOCYTES # BLD: 0.7 K/UL (ref 0.1–1.3)
MONOCYTES NFR BLD: 11 % (ref 4–12)
NEUTS SEG # BLD: 3.4 K/UL (ref 1.7–8.2)
NEUTS SEG NFR BLD: 54 % (ref 43–78)
NRBC # BLD: 0 K/UL (ref 0–0.2)
P-R INTERVAL, ECG05: 166 MS
PLATELET # BLD AUTO: 218 K/UL (ref 150–450)
PMV BLD AUTO: 9.3 FL (ref 9.4–12.3)
POTASSIUM SERPL-SCNC: 3.3 MMOL/L (ref 3.5–5.1)
Q-T INTERVAL, ECG07: 418 MS
QRS DURATION, ECG06: 108 MS
QTC CALCULATION (BEZET), ECG08: 438 MS
RBC # BLD AUTO: 4.46 M/UL (ref 4.23–5.6)
SODIUM SERPL-SCNC: 143 MMOL/L (ref 136–145)
SPECIMEN SOURCE: NORMAL
TROPONIN I BLD-MCNC: 0.02 NG/ML (ref 0.02–0.05)
VENTRICULAR RATE, ECG03: 66 BPM
WBC # BLD AUTO: 6.3 K/UL (ref 4.3–11.1)

## 2019-12-08 PROCEDURE — 84484 ASSAY OF TROPONIN QUANT: CPT

## 2019-12-08 PROCEDURE — 85025 COMPLETE CBC W/AUTO DIFF WBC: CPT

## 2019-12-08 PROCEDURE — 71046 X-RAY EXAM CHEST 2 VIEWS: CPT

## 2019-12-08 PROCEDURE — 93005 ELECTROCARDIOGRAM TRACING: CPT | Performed by: EMERGENCY MEDICINE

## 2019-12-08 PROCEDURE — 80048 BASIC METABOLIC PNL TOTAL CA: CPT

## 2019-12-08 PROCEDURE — 87804 INFLUENZA ASSAY W/OPTIC: CPT

## 2019-12-08 PROCEDURE — 99283 EMERGENCY DEPT VISIT LOW MDM: CPT | Performed by: EMERGENCY MEDICINE

## 2019-12-08 RX ORDER — DOXYCYCLINE HYCLATE 100 MG
100 TABLET ORAL 2 TIMES DAILY
Qty: 20 TAB | Refills: 0 | Status: SHIPPED | OUTPATIENT
Start: 2019-12-08 | End: 2019-12-18

## 2019-12-08 NOTE — ED TRIAGE NOTES
Reports generalized body aches, non-productive cough, chills, nasal congestion, chest pain with cough. Onset of symptoms Wednesday after receiving flu shot. Denies n/v/d. Denies smoker. Attempted chloraseptic.

## 2019-12-08 NOTE — LETTER
[address] [facility] EMERGENCY DEPT 
[address] [facility] 
[phone] Work/School Note Date: 12/8/2019 To Whom It May concern: 
 
Ping Collado was seen and treated today in the emergency room by the following provider(s): 
Attending Provider: Tammy Walton MD. Ping Collado may return to work on 12/10/19 if symptoms improved.  
 
Sincerely, 
 
 
 
 
Nasir Guerra MD

## 2019-12-08 NOTE — LETTER
[address] [facility] EMERGENCY DEPT 
[address] [facility] 
[phone] Work/School Note Date: 12/8/2019 To Whom It May concern: 
 
Kashif Muñiz was seen and treated today in the emergency room by the following provider(s): 
No providers found. Kashif Muñiz may return to work on 12/9/2019. Sincerely, Linh Miller RN

## 2019-12-08 NOTE — ED NOTES
I have reviewed discharge instructions with the patient. The patient verbalized understanding. Patient left ED via Discharge Method: ambulatory to Home with wife. Opportunity for questions and clarification provided. Patient given 1 scripts. To continue your aftercare when you leave the hospital, you may receive an automated call from our care team to check in on how you are doing. This is a free service and part of our promise to provide the best care and service to meet your aftercare needs.  If you have questions, or wish to unsubscribe from this service please call [phone]. Thank you for Choosing our [facility] Emergency Department.

## 2019-12-08 NOTE — ED PROVIDER NOTES
Patient is a 62 yo male who has history of CHF presents with congestion, runny nose, cough, and sore throat. States began with congestion and runny nose and progressed to scratchy throat and then since Friday with cough and body aches. No nausea or vomiting, no abdominal pain, no chest pain except with coughing episodes, no further complaints. Overall patient is very well appearing and in NAD. Past Medical History:   Diagnosis Date    CHF (congestive heart failure) ([facility] [address] 7/20/2014    with acute/chronic systolic CHF in face of malignant hypertension and polysubstance abuse to include alcohol, cocaine    CHF (congestive heart failure) ([facility] 2/20/15    echo: LVEF 50-55%, mild to mod . MR    Chronic kidney disease     stage III per cardiology ov note 2/25/15 (cr. 1.5)    Diverticulosis of large intestine without diverticulitis 2016    History of alcohol abuse 7/22/2014    History of drug abuse 7/22/14    History of echocardiogram echo: 2/20/15    LVEF 50-55%, mild to mod. MR, mild to moderate concentric LV hypertrophy.     Hx of colonic polyps 2016    Hypertension     Persistent disorder of initiating or maintaining sleep 1/28/2019       Past Surgical History:   Procedure Laterality Date    HX COLONOSCOPY  last 2/8/16    Reina--five small sigmoid polyps--5-7 year recall         Family History:   Problem Relation Age of Onset    Alcohol abuse Mother     Heart Attack Other     Heart Disease Brother 54       Social History     Socioeconomic History    Marital status:      Spouse name: Not on file    Number of children: Not on file    Years of education: Not on file    Highest education level: Not on file   Occupational History    Not on file   Social Needs    Financial resource strain: Not on file    Food insecurity:     Worry: Not on file     Inability: Not on file    Transportation needs:     Medical: Not on file     Non-medical: Not on file   Tobacco Use    Smoking status: Never Smoker    Smokeless tobacco: Never Used   Substance and Sexual Activity    Alcohol use: No     Comment:  has not drank alcohol since July 2014    Drug use: Yes     Types: Cocaine     Comment: not used cocaine since July 2014    Sexual activity: Not on file   Lifestyle    Physical activity:     Days per week: Not on file     Minutes per session: Not on file    Stress: Not on file   Relationships    Social connections:     Talks on phone: Not on file     Gets together: Not on file     Attends Anglican service: Not on file     Active member of club or organization: Not on file     Attends meetings of clubs or organizations: Not on file     Relationship status: Not on file    Intimate partner violence:     Fear of current or ex partner: Not on file     Emotionally abused: Not on file     Physically abused: Not on file     Forced sexual activity: Not on file   Other Topics Concern    Not on file   Social History Narrative    Not on file         ALLERGIES: Sunflower seed and Pine nut    Review of Systems   Constitutional: Negative for chills and fever. HENT: Positive for congestion, postnasal drip, rhinorrhea and sore throat. Eyes: Negative for visual disturbance. Respiratory: Positive for cough. Negative for shortness of breath. Cardiovascular: Negative for chest pain and leg swelling. Gastrointestinal: Negative for abdominal pain, diarrhea, nausea and vomiting. Genitourinary: Negative for dysuria. Musculoskeletal: Negative for back pain and neck pain. Skin: Negative for rash. Neurological: Negative for weakness and headaches. Psychiatric/Behavioral: The patient is not nervous/anxious. Vitals:    12/08/19 0336   BP: 154/89   Pulse: 67   Resp: 18   Temp: 98.6 °F (37 °C)   SpO2: 96%   Weight: 99.3 kg (219 lb)   Height: 6' 1\" (1.854 m)            Physical Exam  Vitals signs and nursing note reviewed. Constitutional:       Appearance: He is well-developed.    HENT:      Head: Normocephalic. Right Ear: External ear normal.      Left Ear: External ear normal.   Eyes:      Conjunctiva/sclera: Conjunctivae normal.      Pupils: Pupils are equal, round, and reactive to light. Neck:      Musculoskeletal: Normal range of motion and neck supple. Trachea: No tracheal deviation. Cardiovascular:      Rate and Rhythm: Normal rate and regular rhythm. Heart sounds: Normal heart sounds. No murmur. Pulmonary:      Effort: Pulmonary effort is normal. No respiratory distress. Breath sounds: Normal breath sounds. No wheezing or rhonchi. Abdominal:      Palpations: Abdomen is soft. Tenderness: There is no tenderness. Musculoskeletal: Normal range of motion. Skin:     Findings: No rash. Neurological:      Mental Status: He is alert and oriented to person, place, and time. Cranial Nerves: No cranial nerve deficit.           MDM  Number of Diagnoses or Management Options     Amount and/or Complexity of Data Reviewed  Clinical lab tests: ordered and reviewed  Tests in the radiology section of CPT®: ordered and reviewed  Tests in the medicine section of CPT®: ordered and reviewed  Review and summarize past medical records: yes    Risk of Complications, Morbidity, and/or Mortality  Presenting problems: high  Diagnostic procedures: high  Management options: high    Patient Progress  Patient progress: stable         Procedures    Recent Results (from the past 12 hour(s))   EKG, 12 LEAD, INITIAL    Collection Time: 12/08/19  3:47 AM   Result Value Ref Range    Ventricular Rate 66 BPM    Atrial Rate 66 BPM    P-R Interval 166 ms    QRS Duration 108 ms    Q-T Interval 418 ms    QTC Calculation (Bezet) 438 ms    Calculated P Axis 50 degrees    Calculated R Axis 63 degrees    Calculated T Axis 55 degrees    Diagnosis       Normal sinus rhythm  Normal ECG  When compared with ECG of 02-MAR-2017 16:13,  No significant change was found       Recent Results (from the past 12 hour(s)) INFLUENZA A & B AG (RAPID TEST)    Collection Time: 12/08/19  3:44 AM   Result Value Ref Range    Influenza A Ag NEGATIVE  NEG      Influenza B Ag NEGATIVE  NEG      Source NASOPHARYNGEAL     EKG, 12 LEAD, INITIAL    Collection Time: 12/08/19  3:47 AM   Result Value Ref Range    Ventricular Rate 66 BPM    Atrial Rate 66 BPM    P-R Interval 166 ms    QRS Duration 108 ms    Q-T Interval 418 ms    QTC Calculation (Bezet) 438 ms    Calculated P Axis 50 degrees    Calculated R Axis 63 degrees    Calculated T Axis 55 degrees    Diagnosis       Normal sinus rhythm  Normal ECG  When compared with ECG of 02-MAR-2017 16:13,  No significant change was found     CBC WITH AUTOMATED DIFF    Collection Time: 12/08/19  3:51 AM   Result Value Ref Range    WBC 6.3 4.3 - 11.1 K/uL    RBC 4.46 4.23 - 5.6 M/uL    HGB 13.3 (L) 13.6 - 17.2 g/dL    HCT 39.8 (L) 41.1 - 50.3 %    MCV 89.2 79.6 - 97.8 FL    MCH 29.8 26.1 - 32.9 PG    MCHC 33.4 31.4 - 35.0 g/dL    RDW 14.6 11.9 - 14.6 %    PLATELET 493 857 - 325 K/uL    MPV 9.3 (L) 9.4 - 12.3 FL    ABSOLUTE NRBC 0.00 0.0 - 0.2 K/uL    DF AUTOMATED      NEUTROPHILS 54 43 - 78 %    LYMPHOCYTES 29 13 - 44 %    MONOCYTES 11 4.0 - 12.0 %    EOSINOPHILS 5 0.5 - 7.8 %    BASOPHILS 1 0.0 - 2.0 %    IMMATURE GRANULOCYTES 0 0.0 - 5.0 %    ABS. NEUTROPHILS 3.4 1.7 - 8.2 K/UL    ABS. LYMPHOCYTES 1.8 0.5 - 4.6 K/UL    ABS. MONOCYTES 0.7 0.1 - 1.3 K/UL    ABS. EOSINOPHILS 0.3 0.0 - 0.8 K/UL    ABS. BASOPHILS 0.0 0.0 - 0.2 K/UL    ABS. IMM.  GRANS. 0.0 0.0 - 0.5 K/UL   METABOLIC PANEL, BASIC    Collection Time: 12/08/19  3:51 AM   Result Value Ref Range    Sodium 143 136 - 145 mmol/L    Potassium 3.3 (L) 3.5 - 5.1 mmol/L    Chloride 107 98 - 107 mmol/L    CO2 30 21 - 32 mmol/L    Anion gap 6 (L) 7 - 16 mmol/L    Glucose 103 (H) 65 - 100 mg/dL    BUN 26 (H) 6 - 23 MG/DL    Creatinine 1.25 0.8 - 1.5 MG/DL    GFR est AA >60 >60 ml/min/1.73m2    GFR est non-AA >60 >60 ml/min/1.73m2    Calcium 8.8 8.3 - 10.4 MG/DL   POC TROPONIN-I    Collection Time: 12/08/19  3:55 AM   Result Value Ref Range    Troponin-I (POC) 0.02 0.02 - 0.05 ng/ml     Xr Chest Pa Lat    Result Date: 12/8/2019  EXAM: Chest x-ray. INDICATION: Cough. COMPARISON: March 2, 2017. TECHNIQUE: Frontal and lateral view chest x-ray. FINDINGS: The lungs are clear. The cardiac size, mediastinal contour and pulmonary vasculature are normal. No pneumothorax or pleural effusion is seen. IMPRESSION: Normal chest x-ray. 64 yo male with cough and congestion:    Patients labs and imaging reassuring, EKG is NSR without acute ischemic changes and patients symptoms consistent with URI vs. Bronchitis so will place on doxy and patient to follow up with PCP in 2-3 days for recheck or return with any worsening cough or SOB or any further concerns.

## 2019-12-23 PROBLEM — R73.01 IFG (IMPAIRED FASTING GLUCOSE): Status: ACTIVE | Noted: 2019-12-23

## 2020-01-07 ENCOUNTER — HOSPITAL ENCOUNTER (EMERGENCY)
Age: 60
Discharge: HOME | End: 2020-01-07
Attending: EMERGENCY MEDICINE
Payer: COMMERCIAL

## 2020-01-07 VITALS
WEIGHT: 225 LBS | SYSTOLIC BLOOD PRESSURE: 141 MMHG | OXYGEN SATURATION: 98 % | BODY MASS INDEX: 29.82 KG/M2 | DIASTOLIC BLOOD PRESSURE: 93 MMHG | RESPIRATION RATE: 18 BRPM | HEIGHT: 73 IN | HEART RATE: 63 BPM | TEMPERATURE: 98.1 F

## 2020-01-07 DIAGNOSIS — S61.213A LACERATION OF LEFT MIDDLE FINGER WITHOUT FOREIGN BODY WITHOUT DAMAGE TO NAIL, INITIAL ENCOUNTER: Primary | ICD-10-CM

## 2020-01-07 PROCEDURE — 74011250636 HC RX REV CODE- 250/636: Performed by: EMERGENCY MEDICINE

## 2020-01-07 PROCEDURE — 77030002916 HC SUT ETHLN J&J -A

## 2020-01-07 PROCEDURE — 90471 IMMUNIZATION ADMIN: CPT | Performed by: EMERGENCY MEDICINE

## 2020-01-07 PROCEDURE — 75810000293 HC SIMP/SUPERF WND  RPR: Performed by: EMERGENCY MEDICINE

## 2020-01-07 PROCEDURE — 77030008304 HC SPLNT FNGR ALUM DERY -A

## 2020-01-07 PROCEDURE — 90715 TDAP VACCINE 7 YRS/> IM: CPT | Performed by: EMERGENCY MEDICINE

## 2020-01-07 PROCEDURE — 99283 EMERGENCY DEPT VISIT LOW MDM: CPT | Performed by: EMERGENCY MEDICINE

## 2020-01-07 RX ORDER — CEPHALEXIN 500 MG/1
500 CAPSULE ORAL 4 TIMES DAILY
Qty: 28 CAP | Refills: 0 | Status: SHIPPED | OUTPATIENT
Start: 2020-01-07 | End: 2020-01-14

## 2020-01-07 RX ADMIN — TETANUS TOXOID, REDUCED DIPHTHERIA TOXOID AND ACELLULAR PERTUSSIS VACCINE, ADSORBED 0.5 ML: 5; 2.5; 8; 8; 2.5 SUSPENSION INTRAMUSCULAR at 02:25

## 2020-01-07 NOTE — ED NOTES
I have reviewed discharge instructions with the patient. The patient verbalized understanding. Patient left ED via Discharge Method: ambulatory to Home with self    Opportunity for questions and clarification provided. Patient given 1 scripts. To continue your aftercare when you leave the hospital, you may receive an automated call from our care team to check in on how you are doing. This is a free service and part of our promise to provide the best care and service to meet your aftercare needs.  If you have questions, or wish to unsubscribe from this service please call [phone]. Thank you for Choosing our [facility] Emergency Department.

## 2020-01-07 NOTE — LETTER
[address] [facility] EMERGENCY DEPT 
[address] [facility] 
[phone] Work/School Note Date: 1/7/2020 To Whom It May concern: 
 
Katiuska Kulkarni was seen and treated today in the emergency room by the following provider(s): 
Attending Provider: Flora Navas may return to work on 1/7/2020. Sincerely, Claudetta Elbe, DO

## 2020-01-07 NOTE — LETTER
[address] [facility] EMERGENCY DEPT 
[address] [facility] 
[phone] Work/School Note Date: 1/7/2020 To Whom It May concern: 
 
Carmel Chacon was seen and treated today in the emergency room by the following provider(s): 
Attending Provider: Rodrick Clements may return to work on 1/8/2020. Sincerely, Ila Edmond, DO

## 2020-01-07 NOTE — DISCHARGE INSTRUCTIONS
Take the antibiotics and keep your finger clean with soap and water changing dressings frequently while at work.   Follow-up with your family doctor or return to the emergency department in 1 week for repeat evaluation and suture removal.

## 2020-01-07 NOTE — ED PROVIDER NOTES
Patient is a 52-year-old male presenting to the emerge department day after cutting his left middle finger. The patient states he was trying to cut some frozen sausage to cook some food for a midnight snack and the knife went into his finger. The patient states he thinks his last tetanus shot was 6 to 10 years. He works as a  at [employer] here in [place] and says that he uses his hands all the the patient denies any loss of sensation distal to the laceration. Past Medical History:   Diagnosis Date    CHF (congestive heart failure) ([facility] [address] 7/20/2014    with acute/chronic systolic CHF in face of malignant hypertension and polysubstance abuse to include alcohol, cocaine    CHF (congestive heart failure) ([facility] [address] 2/20/15    echo: LVEF 50-55%, mild to mod . MR    Chronic kidney disease     stage III per cardiology ov note 2/25/15 (cr. 1.5)    Diverticulosis of large intestine without diverticulitis 2016    History of alcohol abuse 7/22/2014    [facility] of [facility]    History of echocardiogram echo: 2/20/15    LVEF 50-55%, mild to mod. MR, mild to moderate concentric LV hypertrophy.     Hx of colonic polyps 2016    Hypertension     IFG (impaired fasting glucose) 12/23/2019    Persistent disorder of initiating or maintaining sleep 1/28/2019       Past Surgical History:   Procedure Laterality Date    HX COLONOSCOPY  last 2/8/16    Reina--five small sigmoid polyps--5-7 year recall         Family History:   Problem Relation Age of Onset    Alcohol abuse Mother     Heart Attack Other     Heart Disease Brother 54       Social History     Socioeconomic History    Marital status:      Spouse name: Not on file    Number of children: 3    Years of education: Not on file    Highest education level: Not on file   Occupational History    Occupation: cook   Social Needs    Financial resource strain: Not on file    Food insecurity:     Worry: Not on file     Inability: Not on file    Transportation needs:     Medical: Not on file     Non-medical: Not on file   Tobacco Use    Smoking status: Never Smoker    Smokeless tobacco: Never Used   Substance and Sexual Activity    Alcohol use: Yes     Alcohol/week: 12.0 standard drinks     Types: 12 Standard drinks or equivalent per week    Drug use: Yes     Types: Cocaine     Comment: not used cocaine since July 2014    Sexual activity: Not on file   Lifestyle    Physical activity:     Days per week: Not on file     Minutes per session: Not on file    Stress: Not on file   Relationships    Social connections:     Talks on phone: Not on file     Gets together: Not on file     Attends Sikh service: Not on file     Active member of club or organization: Not on file     Attends meetings of clubs or organizations: Not on file     Relationship status: Not on file    Intimate partner violence:     Fear of current or ex partner: Not on file     Emotionally abused: Not on file     Physically abused: Not on file     Forced sexual activity: Not on file   Other Topics Concern    Not on file   Social History Narrative    Not on file         ALLERGIES: Sunflower seed and Pine nut    Review of Systems   Constitutional: Negative. Musculoskeletal: Negative. Skin: Positive for wound. Neurological: Negative. Hematological: Negative. Vitals:    01/07/20 0054 01/07/20 0230   BP: 124/76 (!) 141/93   Pulse: 73 63   Resp: 18 18   Temp: 98.1 °F (36.7 °C) 98.1 °F (36.7 °C)   SpO2: 97% 98%   Weight: 102.1 kg (225 lb)    Height: 6' 1\" (1.854 m)             Physical Exam     GENERAL:The patient is well nourished, and well-hydrated. No acute distress  VITAL SIGNS: Heart rate, blood pressure, respiratory rate reviewed as recorded in  nurse's notes  EYES: Pupils reactive. Extraocular motion intact. No conjunctival redness or drainage.   LUNGS: No accessory muscle use  CARDIOVASCULAR: Regular rate and rhythm  EXTREMITIES: Patient has a 1.5 cm laceration to the palmar aspect of the tip of the left middle finger. No evidence of tendon involvement. Strength with flexion and extension is full. NEUROLOGIC: Cranial nerve exam reveals face is symmetrical, tongue is midline  speech is clear. No focal deficits noted  SKIN: No rash or petechiae. Good skin turgor palpated. PSYCHIATRIC: Alert and oriented. Appropriate behavior and judgment. MDM  Number of Diagnoses or Management Options  Diagnosis management comments: Sprain, strain, tendon injury, contusion,    Abrasion, laceration, neurovascular injury, foreign body    Fracture, open fracture, dislocation, joint separation, articular surface injury,           Wound Repair  Date/Time: 1/7/2020 2:29 AM  Performed by: attendingPreparation: skin prepped with Betadine  Time out: Immediately prior to the procedure a time out was called to verify the correct patient, procedure, equipment, staff and marking as appropriate. .  Location details: left long finger  Wound length:2.5 cm or less  Anesthesia: digital block    Anesthesia:  Local Anesthetic: lidocaine 1% without epinephrine  Foreign bodies: no foreign bodies  Irrigation solution: saline  Irrigation method: syringe  Debridement: none  Skin closure: 4-0 nylon  Number of sutures: 6  Technique: simple  Approximation: close  Dressing: non-adhesive packing strip, antibiotic ointment and splint (Dynamic finger splint)

## 2020-01-07 NOTE — ED TRIAGE NOTES
Reports laceration to left 3rd finger. Cut with kitchen knife approx 1.5 hours pta. Last tetanus unknown.  Bleeding controlled with pressure on arrival. Denies blood thinners

## 2020-01-13 ENCOUNTER — HOSPITAL ENCOUNTER (EMERGENCY)
Age: 60
Discharge: HOME | End: 2020-01-13
Attending: EMERGENCY MEDICINE
Payer: COMMERCIAL

## 2020-01-13 VITALS
HEIGHT: 73 IN | WEIGHT: 225 LBS | HEART RATE: 65 BPM | OXYGEN SATURATION: 97 % | RESPIRATION RATE: 16 BRPM | TEMPERATURE: 98.3 F | BODY MASS INDEX: 29.82 KG/M2 | SYSTOLIC BLOOD PRESSURE: 135 MMHG | DIASTOLIC BLOOD PRESSURE: 85 MMHG

## 2020-01-13 DIAGNOSIS — Z48.02 VISIT FOR SUTURE REMOVAL: Primary | ICD-10-CM

## 2020-01-13 PROCEDURE — 75810000275 HC EMERGENCY DEPT VISIT NO LEVEL OF CARE: Performed by: EMERGENCY MEDICINE

## 2020-01-13 NOTE — ED TRIAGE NOTES
[facility] has sutures placed last Tuesday.   [facility] was told to return to ED today for removal.

## 2020-01-13 NOTE — ED PROVIDER NOTES
55-year-old male presents with suture removal.  He had sutures placed in left index finger January 7. No complications. Suture Removal          Past Medical History:   Diagnosis Date    CHF (congestive heart failure) ([facility] [address] 7/20/2014    with acute/chronic systolic CHF in face of malignant hypertension and polysubstance abuse to include alcohol, cocaine    CHF (congestive heart failure) ([facility] [address] 2/20/15    echo: LVEF 50-55%, mild to mod . MR    Chronic kidney disease     stage III per cardiology ov note 2/25/15 (cr. 1.5)    Diverticulosis of large intestine without diverticulitis 2016    History of alcohol abuse 7/22/2014    [facility]    History of echocardiogram echo: 2/20/15    LVEF 50-55%, mild to mod. MR, mild to moderate concentric LV hypertrophy.  Hx of colonic polyps 2016    Hypertension     IFG (impaired fasting glucose) 12/23/2019    Persistent disorder of initiating or maintaining sleep 1/28/2019       Past Surgical History:   Procedure Laterality Date    HX COLONOSCOPY  last 2/8/16    Reina--five small sigmoid polyps--5-7 year recall         Family History:   Problem Relation Age of Onset    Alcohol abuse Mother     Heart Attack Other     Heart Disease Brother 54       Social History     Socioeconomic History    Marital status:      Spouse name: Not on file    Number of children: 3    Years of education: Not on file    Highest education level: Not on file   Occupational History    Occupation: [employer]   Social Needs    Financial resource strain: Not on file    Food insecurity:     Worry: Not on file     Inability: Not on file    Transportation needs:     Medical: Not on file     Non-medical: Not on file   Tobacco Use    Smoking status: Never Smoker    Smokeless tobacco: Never Used   Substance and Sexual Activity    Alcohol use: Yes     Alcohol/week: 12.0 standard drinks     Types: 12 Standard drinks or equivalent per week    Drug use:  Yes Types: Cocaine     Comment: not used cocaine since July 2014    Sexual activity: Not on file   Lifestyle    Physical activity:     Days per week: Not on file     Minutes per session: Not on file    Stress: Not on file   Relationships    Social connections:     Talks on phone: Not on file     Gets together: Not on file     Attends Jewish service: Not on file     Active member of club or organization: Not on file     Attends meetings of clubs or organizations: Not on file     Relationship status: Not on file    Intimate partner violence:     Fear of current or ex partner: Not on file     Emotionally abused: Not on file     Physically abused: Not on file     Forced sexual activity: Not on file   Other Topics Concern    Not on file   Social History Narrative    Not on file         ALLERGIES: Sunflower seed and Pine nut    Review of Systems   Skin: Positive for wound. Vitals:    01/13/20 0805   BP: 135/85   Pulse: 65   Resp: 16   Temp: 98.3 °F (36.8 °C)   SpO2: 97%   Weight: 102.1 kg (225 lb)   Height: 6' 1\" (1.854 m)            Physical Exam  Vitals signs and nursing note reviewed. Musculoskeletal:        Hands:    Skin:     General: Skin is dry. Neurological:      Mental Status: He is alert. MDM  Number of Diagnoses or Management Options  Visit for suture removal:   Diagnosis management comments: Parts of this document were created using dragon voice recognition software. The chart has been reviewed but errors may still be present. Sutures removed by RN in triage     I discussed the results of all labs, procedures, radiographs, and treatments with the patient and available family. Treatment plan is agreed upon and the patient is ready for discharge. Questions about treatment in the ED and differential diagnosis of presenting condition were answered.   Patient was given verbal discharge instructions including, but not limited to, importance of returning to the emergency department for any concern of worsening or continued symptoms. Instructions were given to follow up with a primary care provider or specialist within 1-2 days. Adverse effects of medications, if prescribed, were discussed and patient was advised to refrain from significant physical activity until followed up by primary care physician and to not drive or operate heavy machinery after taking any sedating substances.              Procedures

## 2020-01-13 NOTE — ED NOTES
I have reviewed discharge instructions with the patient. The patient verbalized understanding. Patient left ED via Discharge Method: ambulatory to Home with self. Opportunity for questions and clarification provided. Patient given 0 scripts. To continue your aftercare when you leave the hospital, you may receive an automated call from our care team to check in on how you are doing. This is a free service and part of our promise to provide the best care and service to meet your aftercare needs.  If you have questions, or wish to unsubscribe from this service please call [phone]. Thank you for Choosing our [address] [place] [address] Emergency Department.

## 2020-09-08 ENCOUNTER — HOSPITAL ENCOUNTER (OUTPATIENT)
Dept: LAB | Age: 60
Discharge: HOME | End: 2020-09-08
Payer: COMMERCIAL

## 2020-09-08 DIAGNOSIS — E78.00 PURE HYPERCHOLESTEROLEMIA: Chronic | ICD-10-CM

## 2020-09-08 DIAGNOSIS — I42.8 OTHER CARDIOMYOPATHY (HCC): ICD-10-CM

## 2020-09-08 DIAGNOSIS — I10 ESSENTIAL HYPERTENSION: ICD-10-CM

## 2020-09-08 LAB
ANION GAP SERPL CALC-SCNC: 7 MMOL/L (ref 7–16)
BASOPHILS # BLD: 0 K/UL (ref 0–0.2)
BASOPHILS NFR BLD: 0 % (ref 0–2)
BUN SERPL-MCNC: 21 MG/DL (ref 6–23)
CALCIUM SERPL-MCNC: 9.4 MG/DL (ref 8.3–10.4)
CHLORIDE SERPL-SCNC: 108 MMOL/L (ref 98–107)
CO2 SERPL-SCNC: 28 MMOL/L (ref 21–32)
CREAT SERPL-MCNC: 1.83 MG/DL (ref 0.8–1.5)
DIFFERENTIAL METHOD BLD: ABNORMAL
EOSINOPHIL # BLD: 0.1 K/UL (ref 0–0.8)
EOSINOPHIL NFR BLD: 2 % (ref 0.5–7.8)
ERYTHROCYTE [DISTWIDTH] IN BLOOD BY AUTOMATED COUNT: 14.8 % (ref 11.9–14.6)
GLUCOSE SERPL-MCNC: 99 MG/DL (ref 65–100)
HCT VFR BLD AUTO: 40.4 % (ref 41.1–50.3)
HGB BLD-MCNC: 13 G/DL (ref 13.6–17.2)
IMM GRANULOCYTES # BLD AUTO: 0 K/UL (ref 0–0.5)
IMM GRANULOCYTES NFR BLD AUTO: 0 % (ref 0–5)
LYMPHOCYTES # BLD: 1.7 K/UL (ref 0.5–4.6)
LYMPHOCYTES NFR BLD: 25 % (ref 13–44)
MCH RBC QN AUTO: 28.9 PG (ref 26.1–32.9)
MCHC RBC AUTO-ENTMCNC: 32.2 G/DL (ref 31.4–35)
MCV RBC AUTO: 89.8 FL (ref 79.6–97.8)
MONOCYTES # BLD: 0.4 K/UL (ref 0.1–1.3)
MONOCYTES NFR BLD: 6 % (ref 4–12)
NEUTS SEG # BLD: 4.5 K/UL (ref 1.7–8.2)
NEUTS SEG NFR BLD: 67 % (ref 43–78)
NRBC # BLD: 0 K/UL (ref 0–0.2)
PLATELET # BLD AUTO: 233 K/UL (ref 150–450)
PMV BLD AUTO: 9.8 FL (ref 9.4–12.3)
POTASSIUM SERPL-SCNC: 3.8 MMOL/L (ref 3.5–5.1)
RBC # BLD AUTO: 4.5 M/UL (ref 4.23–5.6)
SODIUM SERPL-SCNC: 143 MMOL/L (ref 136–145)
WBC # BLD AUTO: 6.7 K/UL (ref 4.3–11.1)

## 2020-09-08 PROCEDURE — 80048 BASIC METABOLIC PNL TOTAL CA: CPT

## 2020-09-08 PROCEDURE — 36415 COLL VENOUS BLD VENIPUNCTURE: CPT

## 2020-09-08 PROCEDURE — 85025 COMPLETE CBC W/AUTO DIFF WBC: CPT

## 2021-03-09 PROBLEM — U07.1 COVID-19 VIRUS INFECTION: Status: ACTIVE | Noted: 2021-01-27

## 2021-03-09 PROBLEM — R56.9 SEIZURE (HCC): Status: ACTIVE | Noted: 2021-03-09

## 2021-03-18 ENCOUNTER — HOSPITAL ENCOUNTER (OUTPATIENT)
Dept: GENERAL RADIOLOGY | Age: 61
Discharge: HOME | End: 2021-03-18
Payer: COMMERCIAL

## 2021-03-18 DIAGNOSIS — U07.1 COVID-19 VIRUS INFECTION: ICD-10-CM

## 2021-03-18 PROCEDURE — 71046 X-RAY EXAM CHEST 2 VIEWS: CPT

## 2021-04-19 ENCOUNTER — APPOINTMENT (OUTPATIENT)
Dept: GENERAL RADIOLOGY | Age: 61
End: 2021-04-19
Attending: STUDENT IN AN ORGANIZED HEALTH CARE EDUCATION/TRAINING PROGRAM
Payer: COMMERCIAL

## 2021-04-19 ENCOUNTER — HOSPITAL ENCOUNTER (EMERGENCY)
Age: 61
Discharge: HOME | End: 2021-04-19
Attending: EMERGENCY MEDICINE
Payer: COMMERCIAL

## 2021-04-19 VITALS
BODY MASS INDEX: 31.15 KG/M2 | DIASTOLIC BLOOD PRESSURE: 88 MMHG | TEMPERATURE: 97.8 F | HEART RATE: 72 BPM | RESPIRATION RATE: 28 BRPM | HEIGHT: 72 IN | WEIGHT: 230 LBS | OXYGEN SATURATION: 98 % | SYSTOLIC BLOOD PRESSURE: 135 MMHG

## 2021-04-19 DIAGNOSIS — R60.0 BILATERAL LOWER EXTREMITY EDEMA: ICD-10-CM

## 2021-04-19 DIAGNOSIS — R06.02 SOB (SHORTNESS OF BREATH): Primary | ICD-10-CM

## 2021-04-19 LAB
ALBUMIN SERPL-MCNC: 3.2 G/DL (ref 3.5–5)
ALBUMIN/GLOB SERPL: 0.8 {RATIO} (ref 1.2–3.5)
ALP SERPL-CCNC: 69 U/L (ref 50–136)
ALT SERPL-CCNC: 51 U/L (ref 12–65)
ANION GAP SERPL CALC-SCNC: 6 MMOL/L (ref 7–16)
AST SERPL-CCNC: 35 U/L (ref 15–37)
BASOPHILS # BLD: 0 K/UL (ref 0–0.2)
BASOPHILS NFR BLD: 1 % (ref 0–2)
BILIRUB SERPL-MCNC: 0.4 MG/DL (ref 0.2–1.1)
BNP SERPL-MCNC: 42 PG/ML (ref 5–125)
BUN SERPL-MCNC: 13 MG/DL (ref 6–23)
CALCIUM SERPL-MCNC: 8.7 MG/DL (ref 8.3–10.4)
CHLORIDE SERPL-SCNC: 111 MMOL/L (ref 98–107)
CO2 SERPL-SCNC: 26 MMOL/L (ref 21–32)
CREAT SERPL-MCNC: 1.15 MG/DL (ref 0.8–1.5)
DIFFERENTIAL METHOD BLD: ABNORMAL
EOSINOPHIL # BLD: 0.3 K/UL (ref 0–0.8)
EOSINOPHIL NFR BLD: 5 % (ref 0.5–7.8)
ERYTHROCYTE [DISTWIDTH] IN BLOOD BY AUTOMATED COUNT: 16.4 % (ref 11.9–14.6)
GLOBULIN SER CALC-MCNC: 4.1 G/DL (ref 2.3–3.5)
GLUCOSE SERPL-MCNC: 115 MG/DL (ref 65–100)
HCT VFR BLD AUTO: 35.7 % (ref 41.1–50.3)
HGB BLD-MCNC: 11.8 G/DL (ref 13.6–17.2)
IMM GRANULOCYTES # BLD AUTO: 0 K/UL (ref 0–0.5)
IMM GRANULOCYTES NFR BLD AUTO: 1 % (ref 0–5)
LYMPHOCYTES # BLD: 1.9 K/UL (ref 0.5–4.6)
LYMPHOCYTES NFR BLD: 34 % (ref 13–44)
MCH RBC QN AUTO: 29.6 PG (ref 26.1–32.9)
MCHC RBC AUTO-ENTMCNC: 33.1 G/DL (ref 31.4–35)
MCV RBC AUTO: 89.5 FL (ref 79.6–97.8)
MONOCYTES # BLD: 0.5 K/UL (ref 0.1–1.3)
MONOCYTES NFR BLD: 9 % (ref 4–12)
NEUTS SEG # BLD: 2.9 K/UL (ref 1.7–8.2)
NEUTS SEG NFR BLD: 51 % (ref 43–78)
NRBC # BLD: 0 K/UL (ref 0–0.2)
PLATELET # BLD AUTO: 273 K/UL (ref 150–450)
PMV BLD AUTO: 8.9 FL (ref 9.4–12.3)
POTASSIUM SERPL-SCNC: 3.6 MMOL/L (ref 3.5–5.1)
PROT SERPL-MCNC: 7.3 G/DL (ref 6.3–8.2)
RBC # BLD AUTO: 3.99 M/UL (ref 4.23–5.6)
SODIUM SERPL-SCNC: 143 MMOL/L (ref 136–145)
TROPONIN-HIGH SENSITIVITY: 11.3 PG/ML (ref 0–14)
WBC # BLD AUTO: 5.6 K/UL (ref 4.3–11.1)

## 2021-04-19 PROCEDURE — 93005 ELECTROCARDIOGRAM TRACING: CPT | Performed by: STUDENT IN AN ORGANIZED HEALTH CARE EDUCATION/TRAINING PROGRAM

## 2021-04-19 PROCEDURE — 96374 THER/PROPH/DIAG INJ IV PUSH: CPT

## 2021-04-19 PROCEDURE — 93005 ELECTROCARDIOGRAM TRACING: CPT | Performed by: EMERGENCY MEDICINE

## 2021-04-19 PROCEDURE — 80053 COMPREHEN METABOLIC PANEL: CPT

## 2021-04-19 PROCEDURE — 74011250636 HC RX REV CODE- 250/636: Performed by: EMERGENCY MEDICINE

## 2021-04-19 PROCEDURE — 85025 COMPLETE CBC W/AUTO DIFF WBC: CPT

## 2021-04-19 PROCEDURE — 71045 X-RAY EXAM CHEST 1 VIEW: CPT

## 2021-04-19 PROCEDURE — 83880 ASSAY OF NATRIURETIC PEPTIDE: CPT

## 2021-04-19 PROCEDURE — 84484 ASSAY OF TROPONIN QUANT: CPT

## 2021-04-19 PROCEDURE — 99284 EMERGENCY DEPT VISIT MOD MDM: CPT

## 2021-04-19 RX ORDER — FUROSEMIDE 40 MG/1
40 TABLET ORAL DAILY
Qty: 30 TAB | Refills: 1 | Status: SHIPPED | OUTPATIENT
Start: 2021-04-19 | End: 2021-08-06 | Stop reason: SDUPTHER

## 2021-04-19 RX ORDER — FUROSEMIDE 10 MG/ML
40 INJECTION INTRAMUSCULAR; INTRAVENOUS
Status: COMPLETED | OUTPATIENT
Start: 2021-04-19 | End: 2021-04-19

## 2021-04-19 RX ADMIN — FUROSEMIDE 40 MG: 10 INJECTION, SOLUTION INTRAMUSCULAR; INTRAVENOUS at 21:56

## 2021-04-20 LAB
ATRIAL RATE: 82 BPM
CALCULATED P AXIS, ECG09: 52 DEGREES
CALCULATED R AXIS, ECG10: 54 DEGREES
CALCULATED T AXIS, ECG11: 46 DEGREES
DIAGNOSIS, 93000: NORMAL
P-R INTERVAL, ECG05: 168 MS
Q-T INTERVAL, ECG07: 392 MS
QRS DURATION, ECG06: 98 MS
QTC CALCULATION (BEZET), ECG08: 457 MS
VENTRICULAR RATE, ECG03: 82 BPM

## 2021-04-20 NOTE — ED PROVIDER NOTES
60-year-old male comes in with his wife. Patient states increasing swelling in his legs and stomach to a lesser extent in his wrist and hands over the last week or so. He has some dyspnea on exertion or orthopnea. He is on oxygen at home 2 L chronically. He has had no chest pain, fever chills or cough, vomiting or diarrhea. No abnormal bleeding. No syncope. Patient's past medical records are reviewed. He has history of sleep apnea also some hypertension congestive heart failure. Most significantly patient was admitted about 3 months ago for Covid manifested as shortness of breath followed by cardiac arrest.  He was intubated for 4 days. Stable to be discharged home but on oxygen. Denies cigarette use at this point. The history is provided by the patient. Leg Swelling   This is a new problem. The current episode started more than 1 week ago. The problem occurs constantly. The problem has been gradually worsening. The patient is experiencing no pain. Pertinent negatives include full range of motion, no back pain and no neck pain. He has tried nothing for the symptoms. There has been no history of extremity trauma. Past Medical History:   Diagnosis Date    CHF (congestive heart failure) ([address] 7/20/2014    with acute/chronic systolic CHF in face of malignant hypertension and polysubstance abuse to include alcohol, cocaine    CHF (congestive heart failure) ([address] 2/20/15    echo: LVEF 50-55%, mild to mod . MR    Chronic kidney disease     stage III per cardiology ov note 2/25/15 (cr. 1.5)    COVID-19 virus infection 1/27/2021    Diverticulosis of large intestine without diverticulitis 2016    History of alcohol abuse 7/22/2014    History of drug abuse ([address] 7/22/14    History of echocardiogram echo: 2/20/15    LVEF 50-55%, mild to mod. MR, mild to moderate concentric LV hypertrophy.     Hx of colonic polyps 2016    Hypertension     IFG (impaired fasting glucose) 12/23/2019    Persistent disorder of initiating or maintaining sleep 1/28/2019       Past Surgical History:   Procedure Laterality Date    HX COLONOSCOPY  last 2/8/16    Reina--five small sigmoid polyps--5-7 year recall         Family History:   Problem Relation Age of Onset    Alcohol abuse Mother     Heart Attack Other     Heart Disease Brother 54       Social History     Socioeconomic History    Marital status:      Spouse name: Not on file    Number of children: 3    Years of education: Not on file    Highest education level: Not on file   Occupational History    Occupation: [employer]   Social Needs    Financial resource strain: Not on file    Food insecurity     Worry: Not on file     Inability: Not on file   [employer] needs     Medical: Not on file     Non-medical: Not on file   Tobacco Use    Smoking status: Never Smoker    Smokeless tobacco: Never Used   Substance and Sexual Activity    Alcohol use: Not Currently    Drug use: Yes     Types: Cocaine     Comment: not used cocaine since July 2014    Sexual activity: Not on file   Lifestyle    Physical activity     Days per week: Not on file     Minutes per session: Not on file    Stress: Not on file   Relationships    Social connections     Talks on phone: Not on file     Gets together: Not on file     Attends Buddhism service: Not on file     Active member of club or organization: Not on file     Attends meetings of clubs or organizations: Not on file     Relationship status: Not on file    Intimate partner violence     Fear of current or ex partner: Not on file     Emotionally abused: Not on file     Physically abused: Not on file     Forced sexual activity: Not on file   Other Topics Concern    Not on file   Social History Narrative    Not on file         ALLERGIES: Sunflower seed and Pine nut    Review of Systems   Constitutional: Negative for chills and fever. HENT: Negative for ear pain. Respiratory: Positive for shortness of breath. Negative for cough and wheezing. Cardiovascular: Positive for leg swelling. Negative for chest pain and palpitations. Gastrointestinal: Negative for abdominal pain, diarrhea and vomiting. Genitourinary: Negative for dysuria and flank pain. Musculoskeletal: Negative for back pain and neck pain. Skin: Negative for color change and rash. Neurological: Negative for syncope and headaches. All other systems reviewed and are negative. Vitals:    04/19/21 2033   BP: (!) 144/75   Pulse: 80   Resp: 20   Temp: 97.8 °F (36.6 °C)   SpO2: 97%   Weight: 104.3 kg (230 lb)   Height: 6' (1.829 m)            Physical Exam  Vitals signs and nursing note reviewed. Constitutional:       General: He is not in acute distress. Appearance: He is well-developed. HENT:      Head: Normocephalic and atraumatic. Right Ear: External ear normal.      Left Ear: External ear normal.      Mouth/Throat:      Pharynx: No oropharyngeal exudate. Eyes:      Conjunctiva/sclera: Conjunctivae normal.      Pupils: Pupils are equal, round, and reactive to light. Neck:      Musculoskeletal: Normal range of motion and neck supple. Cardiovascular:      Rate and Rhythm: Normal rate and regular rhythm. Heart sounds: No murmur. Pulmonary:      Effort: No respiratory distress. Breath sounds: Normal breath sounds. Abdominal:      General: Bowel sounds are normal.      Palpations: Abdomen is soft. There is no mass. Tenderness: There is no abdominal tenderness. There is no guarding or rebound. Hernia: No hernia is present. Musculoskeletal:         General: Swelling present. No tenderness. Right lower leg: No edema. Left lower leg: No edema. Comments: 1+ pitting edema bilaterally and legs. Skin:     General: Skin is warm and dry. Neurological:      Mental Status: He is alert and oriented to person, place, and time.       Gait: Gait normal.      Comments: Nl speech   Psychiatric: Speech: Speech normal.          MDM  Number of Diagnoses or Management Options  Diagnosis management comments: Patient with excellent O2 saturation on his normal 2 L. Check chest x-ray for any congestive failure evidence. Suspect worsening edema and the need to increase his Lasix dose. Doubt DVT or PE. Amount and/or Complexity of Data Reviewed  Clinical lab tests: ordered and reviewed  Tests in the radiology section of CPT®: ordered and reviewed  Tests in the medicine section of CPT®: ordered and reviewed  Review and summarize past medical records: yes  Independent visualization of images, tracings, or specimens: yes (My interpretation of EKG shows normal sinus rhythm at 80. No ST-T changes or ectopy. Normal QT interval.)    Risk of Complications, Morbidity, and/or Mortality  Presenting problems: moderate  Diagnostic procedures: minimal  Management options: low    Patient Progress  Patient progress: stable         Procedures    Results Include:    Recent Results (from the past 24 hour(s))   CBC WITH AUTOMATED DIFF    Collection Time: 04/19/21  8:44 PM   Result Value Ref Range    WBC 5.6 4.3 - 11.1 K/uL    RBC 3.99 (L) 4.23 - 5.6 M/uL    HGB 11.8 (L) 13.6 - 17.2 g/dL    HCT 35.7 (L) 41.1 - 50.3 %    MCV 89.5 79.6 - 97.8 FL    MCH 29.6 26.1 - 32.9 PG    MCHC 33.1 31.4 - 35.0 g/dL    RDW 16.4 (H) 11.9 - 14.6 %    PLATELET 800 871 - 006 K/uL    MPV 8.9 (L) 9.4 - 12.3 FL    ABSOLUTE NRBC 0.00 0.0 - 0.2 K/uL    DF AUTOMATED      NEUTROPHILS 51 43 - 78 %    LYMPHOCYTES 34 13 - 44 %    MONOCYTES 9 4.0 - 12.0 %    EOSINOPHILS 5 0.5 - 7.8 %    BASOPHILS 1 0.0 - 2.0 %    IMMATURE GRANULOCYTES 1 0.0 - 5.0 %    ABS. NEUTROPHILS 2.9 1.7 - 8.2 K/UL    ABS. LYMPHOCYTES 1.9 0.5 - 4.6 K/UL    ABS. MONOCYTES 0.5 0.1 - 1.3 K/UL    ABS. EOSINOPHILS 0.3 0.0 - 0.8 K/UL    ABS. BASOPHILS 0.0 0.0 - 0.2 K/UL    ABS. IMM.  GRANS. 0.0 0.0 - 0.5 K/UL   METABOLIC PANEL, COMPREHENSIVE    Collection Time: 04/19/21  8:44 PM   Result Value Ref Range    Sodium 143 136 - 145 mmol/L    Potassium 3.6 3.5 - 5.1 mmol/L    Chloride 111 (H) 98 - 107 mmol/L    CO2 26 21 - 32 mmol/L    Anion gap 6 (L) 7 - 16 mmol/L    Glucose 115 (H) 65 - 100 mg/dL    BUN 13 6 - 23 MG/DL    Creatinine 1.15 0.8 - 1.5 MG/DL    GFR est AA >60 >60 ml/min/1.73m2    GFR est non-AA >60 >60 ml/min/1.73m2    Calcium 8.7 8.3 - 10.4 MG/DL    Bilirubin, total 0.4 0.2 - 1.1 MG/DL    ALT (SGPT) 51 12 - 65 U/L    AST (SGOT) 35 15 - 37 U/L    Alk. phosphatase 69 50 - 136 U/L    Protein, total 7.3 6.3 - 8.2 g/dL    Albumin 3.2 (L) 3.5 - 5.0 g/dL    Globulin 4.1 (H) 2.3 - 3.5 g/dL    A-G Ratio 0.8 (L) 1.2 - 3.5     TROPONIN-HIGH SENSITIVITY    Collection Time: 04/19/21  8:44 PM   Result Value Ref Range    Troponin-High Sensitivity 11.3 0 - 14 pg/mL   NT-PRO BNP    Collection Time: 04/19/21  8:44 PM   Result Value Ref Range    NT pro-BNP 42 5 - 125 PG/ML     Xr Chest Sngl V    Result Date: 4/19/2021   Portable view of the chest COMPARISON: March 15, 2021 CLINICAL HISTORY: Leg swelling. FINDINGS: There are bilateral peripheral interstitial/groundglass densities. No pneumothorax or large pleural effusion. Heart is enlarged. Mediastinal contour is within normal limits. Surrounding bones are intact. 1. Mild peripheral interstitial/groundglass densities, nonspecific but may be due to scarring. No evidence of pulmonary edema. 2. No significant change compared to March 18, 2021 x-ray. Lung changes may be due to previous Covid infection. Will give 40 with milligrams Lasix IV and increase patient to 40 mg daily. Follow-up with cardiology.

## 2021-04-20 NOTE — DISCHARGE INSTRUCTIONS
Increase your Lasix from 20 to 40 mg a day. Elevate your legs. Call cardiology office if you do not hear from tomorrow regarding moving up your appointment. Recheck sooner for worse swelling shortness of breath or high fever.

## 2021-04-20 NOTE — ED TRIAGE NOTES
Pt ambulatory to triage wearing a mask. Pt is on 2 L oxygen NC. Reports he wears 2 L oxygen via NC at all times. Pt c/o \"fluid build up\" x 3 days. Pt reports swelling in both legs and feet, stomach, feet and hands. Pt has Hx of CHF. Pt reports he was taking HCTZ but was changed to Lasix a week ago. Pt reports he takes 20 mg once a day. Pt reports SOB with exertion. Pt denies CP or any other physical symptoms. Pt denies using CPAP. Pt reports he was hospitalized for 6 weeks for COVID from January 26 to March 2nd. Pt reports he had seizure and cardiac arrest while hospitalized. Pt reports he takes Keppra for seizures now. Pt denies taking blood thinners. Pt denies diabetes.

## 2021-04-20 NOTE — ED NOTES
I have reviewed discharge instructions with the patient. The patient verbalized understanding. Patient left ED via Discharge Method: ambulatory to Home with his wife. Opportunity for questions and clarification provided. Patient given 1 scripts. To continue your aftercare when you leave the hospital, you may receive an automated call from our care team to check in on how you are doing.  This is a free service and part of our promise to provide the best care and service to meet your aftercare needs. \" If you have questions, or wish to unsubscribe from this service please call [phone].  Thank you for Choosing our [facility] Emergency Department.

## 2021-04-28 ENCOUNTER — HOSPITAL ENCOUNTER (OUTPATIENT)
Dept: CARDIAC REHAB | Age: 61
Discharge: HOME | End: 2021-04-28

## 2021-04-28 NOTE — CARDIO/PULMONARY
2021      Dear Radha Haq    Thank you for referring your patient, Bridger Rodriguez ( 1960), to the Pulmonary Rehabilitation Program at [facility]. Mr. Ulysses Hermann is a good candidate for the program and should see improvements with regular participation. We will be working to increase your patient's endurance and self care over the next 12 weeks. We will contact you with any issues or concerns that may arise, or you can follow your patient's progress through [address] [place] [address] at any time. We will send you a final summary report when the program is completed. Again, thank you for your referral. If we can be of further assistance, please feel free to contact the Cardiopulmonary Rehab staff at [phone].     Sincerely,      Lanette BoxerKari Kings, ILEANA,   Pulmonary Rehab Specialist   [facility]    CC: File

## 2021-05-04 ENCOUNTER — HOSPITAL ENCOUNTER (OUTPATIENT)
Dept: LAB | Age: 61
Discharge: HOME | End: 2021-05-04
Payer: COMMERCIAL

## 2021-05-04 DIAGNOSIS — I10 ESSENTIAL HYPERTENSION: Chronic | ICD-10-CM

## 2021-05-04 LAB
ANION GAP SERPL CALC-SCNC: 3 MMOL/L (ref 7–16)
BUN SERPL-MCNC: 9 MG/DL (ref 6–23)
CALCIUM SERPL-MCNC: 9.1 MG/DL (ref 8.3–10.4)
CHLORIDE SERPL-SCNC: 110 MMOL/L (ref 98–107)
CO2 SERPL-SCNC: 28 MMOL/L (ref 21–32)
CREAT SERPL-MCNC: 1.1 MG/DL (ref 0.8–1.5)
GLUCOSE SERPL-MCNC: 105 MG/DL (ref 65–100)
POTASSIUM SERPL-SCNC: 3.5 MMOL/L (ref 3.5–5.1)
SODIUM SERPL-SCNC: 141 MMOL/L (ref 138–145)

## 2021-05-04 PROCEDURE — 36415 COLL VENOUS BLD VENIPUNCTURE: CPT

## 2021-05-04 PROCEDURE — 80048 BASIC METABOLIC PNL TOTAL CA: CPT

## 2021-05-10 ENCOUNTER — HOSPITAL ENCOUNTER (OUTPATIENT)
Dept: LAB | Age: 61
Discharge: HOME | End: 2021-05-10
Attending: INTERNAL MEDICINE
Payer: COMMERCIAL

## 2021-05-10 DIAGNOSIS — E78.2 MIXED HYPERLIPIDEMIA: ICD-10-CM

## 2021-05-10 DIAGNOSIS — R35.1 NOCTURIA: ICD-10-CM

## 2021-05-10 DIAGNOSIS — I10 ESSENTIAL HYPERTENSION: Chronic | ICD-10-CM

## 2021-05-10 DIAGNOSIS — E55.9 VITAMIN D DEFICIENCY: ICD-10-CM

## 2021-05-10 DIAGNOSIS — R73.01 IFG (IMPAIRED FASTING GLUCOSE): ICD-10-CM

## 2021-05-10 LAB
25(OH)D3 SERPL-MCNC: 27.5 NG/ML (ref 30–100)
ALBUMIN SERPL-MCNC: 3.7 G/DL (ref 3.5–5)
ALBUMIN/GLOB SERPL: 0.8 {RATIO} (ref 1.2–3.5)
ALP SERPL-CCNC: 81 U/L (ref 50–136)
ALT SERPL-CCNC: 43 U/L (ref 12–65)
ANION GAP SERPL CALC-SCNC: 6 MMOL/L (ref 7–16)
APPEARANCE UR: CLEAR
AST SERPL-CCNC: 27 U/L (ref 15–37)
BASOPHILS # BLD: 0 K/UL (ref 0–0.2)
BASOPHILS NFR BLD: 1 % (ref 0–2)
BILIRUB SERPL-MCNC: 0.6 MG/DL (ref 0.2–1.1)
BILIRUB UR QL: NEGATIVE
BUN SERPL-MCNC: 16 MG/DL (ref 6–23)
CALCIUM SERPL-MCNC: 9.5 MG/DL (ref 8.3–10.4)
CHLORIDE SERPL-SCNC: 107 MMOL/L (ref 98–107)
CHOLEST SERPL-MCNC: 176 MG/DL
CO2 SERPL-SCNC: 28 MMOL/L (ref 21–32)
COLOR UR: YELLOW
CREAT SERPL-MCNC: 1.13 MG/DL (ref 0.8–1.5)
DIFFERENTIAL METHOD BLD: ABNORMAL
EOSINOPHIL # BLD: 0.3 K/UL (ref 0–0.8)
EOSINOPHIL NFR BLD: 6 % (ref 0.5–7.8)
ERYTHROCYTE [DISTWIDTH] IN BLOOD BY AUTOMATED COUNT: 15.3 % (ref 11.9–14.6)
EST. AVERAGE GLUCOSE BLD GHB EST-MCNC: 143 MG/DL
GLOBULIN SER CALC-MCNC: 4.5 G/DL (ref 2.3–3.5)
GLUCOSE SERPL-MCNC: 110 MG/DL (ref 65–100)
GLUCOSE UR STRIP.AUTO-MCNC: NEGATIVE MG/DL
HBA1C MFR BLD: 6.6 % (ref 4.2–6.3)
HCT VFR BLD AUTO: 40 % (ref 41.1–50.3)
HDLC SERPL-MCNC: 48 MG/DL (ref 40–60)
HDLC SERPL: 3.7 {RATIO}
HGB BLD-MCNC: 13.1 G/DL (ref 13.6–17.2)
HGB UR QL STRIP: NEGATIVE
IMM GRANULOCYTES # BLD AUTO: 0 K/UL (ref 0–0.5)
IMM GRANULOCYTES NFR BLD AUTO: 0 % (ref 0–5)
KETONES UR QL STRIP.AUTO: NEGATIVE MG/DL
LDLC SERPL CALC-MCNC: 106.6 MG/DL
LEUKOCYTE ESTERASE UR QL STRIP.AUTO: NEGATIVE
LIPID PROFILE,FLP: ABNORMAL
LYMPHOCYTES # BLD: 2.2 K/UL (ref 0.5–4.6)
LYMPHOCYTES NFR BLD: 45 % (ref 13–44)
MCH RBC QN AUTO: 28.9 PG (ref 26.1–32.9)
MCHC RBC AUTO-ENTMCNC: 32.8 G/DL (ref 31.4–35)
MCV RBC AUTO: 88.3 FL (ref 79.6–97.8)
MONOCYTES # BLD: 0.5 K/UL (ref 0.1–1.3)
MONOCYTES NFR BLD: 10 % (ref 4–12)
NEUTS SEG # BLD: 1.8 K/UL (ref 1.7–8.2)
NEUTS SEG NFR BLD: 39 % (ref 43–78)
NITRITE UR QL STRIP.AUTO: NEGATIVE
NRBC # BLD: 0 K/UL (ref 0–0.2)
PH UR STRIP: 5.5 [PH] (ref 5–9)
PLATELET # BLD AUTO: 260 K/UL (ref 150–450)
PMV BLD AUTO: 9.4 FL (ref 9.4–12.3)
POTASSIUM SERPL-SCNC: 3.5 MMOL/L (ref 3.5–5.1)
PROT SERPL-MCNC: 8.2 G/DL (ref 6.3–8.2)
PROT UR STRIP-MCNC: NEGATIVE MG/DL
PSA SERPL-MCNC: 0.4 NG/ML
RBC # BLD AUTO: 4.53 M/UL (ref 4.23–5.6)
SODIUM SERPL-SCNC: 141 MMOL/L (ref 136–145)
SP GR UR REFRACTOMETRY: 1 (ref 1–1.02)
T4 FREE SERPL-MCNC: 0.7 NG/DL (ref 0.78–1.46)
TRIGL SERPL-MCNC: 107 MG/DL (ref 35–150)
TSH SERPL DL<=0.005 MIU/L-ACNC: 3.04 UIU/ML
UROBILINOGEN UR QL STRIP.AUTO: 0.2 EU/DL (ref 0.2–1)
VLDLC SERPL CALC-MCNC: 21.4 MG/DL (ref 6–23)
WBC # BLD AUTO: 4.7 K/UL (ref 4.3–11.1)

## 2021-05-10 PROCEDURE — 82306 VITAMIN D 25 HYDROXY: CPT

## 2021-05-10 PROCEDURE — 85025 COMPLETE CBC W/AUTO DIFF WBC: CPT

## 2021-05-10 PROCEDURE — 84153 ASSAY OF PSA TOTAL: CPT

## 2021-05-10 PROCEDURE — 36415 COLL VENOUS BLD VENIPUNCTURE: CPT

## 2021-05-10 PROCEDURE — 84439 ASSAY OF FREE THYROXINE: CPT

## 2021-05-10 PROCEDURE — 83036 HEMOGLOBIN GLYCOSYLATED A1C: CPT

## 2021-05-10 PROCEDURE — 84443 ASSAY THYROID STIM HORMONE: CPT

## 2021-05-10 PROCEDURE — 81003 URINALYSIS AUTO W/O SCOPE: CPT

## 2021-05-10 PROCEDURE — 80061 LIPID PANEL: CPT

## 2021-05-10 PROCEDURE — 80053 COMPREHEN METABOLIC PANEL: CPT

## 2021-05-11 ENCOUNTER — HOSPITAL ENCOUNTER (OUTPATIENT)
Dept: CARDIAC REHAB | Age: 61
Discharge: HOME | End: 2021-05-11
Payer: COMMERCIAL

## 2021-05-11 VITALS — WEIGHT: 220.6 LBS | BODY MASS INDEX: 29.88 KG/M2 | HEIGHT: 72 IN

## 2021-05-11 PROCEDURE — G0239 OTH RESP PROC, GROUP: HCPCS

## 2021-05-17 ENCOUNTER — HOSPITAL ENCOUNTER (OUTPATIENT)
Dept: CARDIAC REHAB | Age: 61
Discharge: HOME | End: 2021-05-17
Payer: COMMERCIAL

## 2021-05-17 VITALS — WEIGHT: 220.8 LBS | BODY MASS INDEX: 29.95 KG/M2

## 2021-05-17 PROBLEM — E55.9 VITAMIN D DEFICIENCY: Status: ACTIVE | Noted: 2021-05-17

## 2021-05-17 PROBLEM — K63.5 HYPERPLASTIC COLONIC POLYP: Status: ACTIVE | Noted: 2017-10-09

## 2021-05-17 PROBLEM — F41.9 ANXIETY: Status: ACTIVE | Noted: 2021-05-17

## 2021-05-17 PROBLEM — E11.9 TYPE 2 DIABETES MELLITUS WITHOUT COMPLICATION, WITHOUT LONG-TERM CURRENT USE OF INSULIN (HCC): Status: ACTIVE | Noted: 2021-05-17

## 2021-05-17 PROCEDURE — G0239 OTH RESP PROC, GROUP: HCPCS

## 2021-05-19 ENCOUNTER — APPOINTMENT (OUTPATIENT)
Dept: CARDIAC REHAB | Age: 61
End: 2021-05-19
Payer: COMMERCIAL

## 2021-05-24 ENCOUNTER — HOSPITAL ENCOUNTER (OUTPATIENT)
Dept: CARDIAC REHAB | Age: 61
Discharge: HOME | End: 2021-05-24
Payer: COMMERCIAL

## 2021-05-24 VITALS — WEIGHT: 219.8 LBS | BODY MASS INDEX: 29.81 KG/M2

## 2021-05-24 PROCEDURE — G0239 OTH RESP PROC, GROUP: HCPCS

## 2021-05-24 NOTE — PROGRESS NOTES
62year old male with ERICA, pulmonary infiltrates, s/p COVID 19 cardiac arrest and  pnemonia enrolled in pulmonary rehabilitation, hx CHF with recent admission for leg swelling seen today for initial nutrition counseling. States fair energy level and no concerns with appetite today. Stated Nutrition Goals: 21 pound weight loss. Medical History: CHF dx 7 year ago, CKD- on 64 ounces of fluid restriction until follow up visit with letty CURTIS for dependence,     Nutrition related medications/supplements: lasix 40mg daily, allopurinol, KCL. Metformin, Vitamin D3  Nutrition Related Labs: A1C =6.6 (H-  Diabetes). Social History/Support System: On disability, works as a cook at [employer], enjoys cooking. Physical Activity: breathless waling up a flight of stair, poor sleep quality he says. Rehabilitation 2x per week off days pt is sedentary. O2 dependant with exertion. Lifestyle, Culture Family Influence: no concerns voiced    Food and Nutrition Intake History:   Spouse and grandson cook for him. Familiar with flavoring foods with herbs and spices instead of salt. Skips meals. 25year old grandson at home with him while spouse is at work. Since starting in the program, he is starting to look at sodium, added sugars and total fat. Food Preferences:   Stated 1 day diet recall includes   Breakfast: Raisin bran + skim milk,   AM Snack: water,   Went to sleep  Lunch skipped  Snack: none  Dinner: meat and potatoes, mac and cheese  PM Snack: none  Beverages: vitamin water (no sugar),   Alcohol use: none  One day food recall appears inadequate in fiber, fruits/vegetables, and protein     GI Hx: /Digestive Issues: none    Anthropometric Data:  BMI:29.81 kg/m²     Height as of 5/17/21: 6' (1.829 m). Weight as of an earlier encounter on 5/24/21: 99.7 kg (219 lb 12.8 oz).   Waist measurement (inches):     Estimated Nutritional Needs:  Calories: MSJ x 1.2 (sedentary) for weight maintenance to start: 2200/ 2000 for weight loss  Protein: 100g (20% of estimated energy low end needs)        Stage of Behavior Change: preparation     Nutrition Diagnosis:    Imbalanced intake of nutrient related to nutrition knowledge, food choices evidenced by diet recall. NUTRITION INTERVENTIONS:  1. Nutrition Prescription Recommendation:   Recommended Modifications of Meals, Snacks and Beverages:  o Include vegetables, fruits, whole grains, nuts/seeds, beans/legumes in all meals. o Less than 13 grams of saturated fat and avoid trans fat daily. o Include unsaturated fat sources (nuts, seeds, avocado). o Consuming fish 2 or more times weekly. o Daily-weekly consumption of dairy, poultry, and eggs  o Limit standard western diet foods include processed meat, processed carbohydrates and fried foods  o Water as primary beverage    2. Cardioprotective Nutrition Education:    Reviewed lab/body comp results/goals, and nutrition modifications that will support improvements in body composition and lab values.  Weight loss strategies reviewed, including sources of empty calories and portion sizes.  Educated patient on cardioprotective meal pattern/Mediterranean meal pattern including  o Guidelines for saturated fat (<7% total calories), sodium ( < 2000mg/day or follow MD recommendations), and added sugars ( < 25 grams for women/<36 grams for men) and high sources of each reviewed  o Consumption of daily fiber intake with emphasis on 7-13 grams of soluble fiber daily and food sources reviewed. o Demonstrated food label readingfor home use to support self efficacy goals. o Sodium education; \"salty six\" foods, food label reading, low sodium food sources and meal planning.   o Meal planning- supported barriers to preparing meals at home. Reviewed tips to reduce convenience eating. - recipe provided     Handouts provided for home use:    Heart healthy eating pattern with 1 day sample menu.    Heart healthy recipes, recipe modification tips.  [place] plate method   Tips for reducing sodium  Nutrition Goals:     5-10% weight loss by increasing f/v to 5 servings per day, eating 3 meals per day, and following meal plan by end of cardiopulmonary rehabilitation. Monitoring/Evaluation: RD to follow up with participant during rehab sessions for questions and assessment of progression toward goals. Anticipated Compliance:Fair, when asked about quick crock pot meals, pt not ready to use crock pot cooking to improve diet quality today. Using food plan to improve nutrient intake and feel better. Pt verbalizes understanding to recommendations discussed.      Barriers: Dependence on family-     Kayleigh Gracia, MS, RD, LD  Cardiopulmonary Rehab Dietitian

## 2021-05-26 ENCOUNTER — HOSPITAL ENCOUNTER (OUTPATIENT)
Dept: CARDIAC REHAB | Age: 61
Discharge: HOME | End: 2021-05-26
Payer: COMMERCIAL

## 2021-05-26 PROCEDURE — G0239 OTH RESP PROC, GROUP: HCPCS

## 2021-06-01 PROBLEM — I42.0 DILATED CARDIOMYOPATHY (HCC): Status: ACTIVE | Noted: 2021-06-01

## 2021-06-01 PROBLEM — I46.9 CARDIAC ARREST (HCC): Status: ACTIVE | Noted: 2021-06-01

## 2021-06-07 ENCOUNTER — HOSPITAL ENCOUNTER (OUTPATIENT)
Dept: CARDIAC REHAB | Age: 61
Discharge: HOME | End: 2021-06-07
Payer: COMMERCIAL

## 2021-06-07 VITALS — WEIGHT: 220.8 LBS | BODY MASS INDEX: 29.95 KG/M2

## 2021-06-07 PROCEDURE — G0239 OTH RESP PROC, GROUP: HCPCS

## 2021-06-09 ENCOUNTER — HOSPITAL ENCOUNTER (OUTPATIENT)
Dept: CARDIAC REHAB | Age: 61
Discharge: HOME | End: 2021-06-09
Payer: COMMERCIAL

## 2021-06-09 PROCEDURE — G0239 OTH RESP PROC, GROUP: HCPCS

## 2021-06-16 ENCOUNTER — HOSPITAL ENCOUNTER (OUTPATIENT)
Dept: CARDIAC REHAB | Age: 61
Discharge: HOME | End: 2021-06-16
Payer: COMMERCIAL

## 2021-06-16 VITALS — WEIGHT: 221 LBS | BODY MASS INDEX: 29.97 KG/M2

## 2021-06-16 PROCEDURE — G0239 OTH RESP PROC, GROUP: HCPCS

## 2021-06-21 ENCOUNTER — HOSPITAL ENCOUNTER (OUTPATIENT)
Dept: CARDIAC REHAB | Age: 61
Discharge: HOME | End: 2021-06-21
Payer: COMMERCIAL

## 2021-06-21 VITALS — BODY MASS INDEX: 30.05 KG/M2 | WEIGHT: 221.6 LBS

## 2021-06-21 PROCEDURE — G0239 OTH RESP PROC, GROUP: HCPCS

## 2021-06-23 ENCOUNTER — APPOINTMENT (OUTPATIENT)
Dept: CARDIAC REHAB | Age: 61
End: 2021-06-23
Payer: COMMERCIAL

## 2021-06-25 ENCOUNTER — HOSPITAL ENCOUNTER (OUTPATIENT)
Dept: GENERAL RADIOLOGY | Age: 61
Discharge: HOME | End: 2021-06-25
Payer: COMMERCIAL

## 2021-06-25 DIAGNOSIS — R91.8 PULMONARY INFILTRATES: ICD-10-CM

## 2021-06-25 PROCEDURE — 71046 X-RAY EXAM CHEST 2 VIEWS: CPT

## 2021-06-28 ENCOUNTER — HOSPITAL ENCOUNTER (OUTPATIENT)
Dept: CARDIAC REHAB | Age: 61
Discharge: HOME | End: 2021-06-28
Payer: COMMERCIAL

## 2021-06-28 VITALS — BODY MASS INDEX: 29.7 KG/M2 | WEIGHT: 219 LBS

## 2021-06-28 PROCEDURE — G0239 OTH RESP PROC, GROUP: HCPCS

## 2021-06-30 ENCOUNTER — HOSPITAL ENCOUNTER (OUTPATIENT)
Dept: CARDIAC REHAB | Age: 61
Discharge: HOME | End: 2021-06-30
Payer: COMMERCIAL

## 2021-06-30 PROCEDURE — G0239 OTH RESP PROC, GROUP: HCPCS

## 2021-06-30 NOTE — PROGRESS NOTES
62year old male with ERICA, pulmonary infiltrates, s/p COVID 19 cardiac arrest and  pnemonia enrolled in pulmonary rehabilitation, hx CHF with recent admission for leg swelling seen today for follow up nutrition counseling. Stated Nutrition Goals: 21 pound weight loss.      Medical History: CHF dx 7 year ago, CKD- on 64 ounces of fluid restriction until follow up visit with MD, letty for dependence,      Nutrition related medications/supplements: lasix 40mg daily, allopurinol, KCL. Metformin, Vitamin D3  Nutrition Related Labs: A1C =6.6 (H-  Diabetes).      Social History/Support System: On disability, works as a cook at [employer], enjoys cooking.       Physical Activity: breathless waling up a flight of stair, poor sleep quality he says. Rehabilitation 2x per week off days pt is sedentary. O2 dependant with exertion.      Lifestyle, Culture Family Influence: no concerns voiced     Food and Nutrition Intake:   States that since last session he has implemented lower sodium options as discussed, including making his own low sodium spice rub and cutting back on processed meats/ and packed foods. No longer skipping meals. Improving protein intake. Shopping at [employer]. Has lost 2 pounds, and would like to continue losing weight.      Food Preferences:   Stated 1 day diet recall includes   Breakfast: oatmeal with blueberries, banana, cinnamon   AM Snack: water,   Went to sleep  Lunch: whole grain tortilla with grilled chicken. Snack: none  Dinner: meat and potatoes, mac and cheese  PM Snack: none  Beverages: water and Gatorade. Alcohol use: none- states that he is not drinking, prefers to spend his money on good quality food rather then beer. One day food recall appears inadequate in fiber, fruits/vegetables, and protein      GI Hx: /Digestive Issues: none     Anthropometric Data:  BMI:29.81 kg/m²   Height: 6' (1.829 m). Weight: 99.7 kg (219 lb 12.8 oz).   Waist measurement (inches):      Estimated Nutritional Needs:  Calories: MSJ x 1.2 (sedentary) for weight maintenance to start: 2200/ 2000 for weight loss  Protein: 100g (20% of estimated energy low end needs)     Stage of Behavior Change: Action      Nutrition Diagnosis:    Excess energy intake related to inadequate fiber and water intake evidenced by diet recall low in non starchy vegetable and grain/legume intake     NUTRITION INTERVENTIONS:  1.   Nutrition Prescription Recommendation:  · Recommended Modifications of Meals, Snacks and Beverages:  ? <2000mg sodium per day. ? Include vegetables, fruits, whole grains, nuts/seeds, beans/legumes in all meals. ? Less than 13 grams of saturated fat and avoid trans fat daily. ? Include unsaturated fat sources (nuts, seeds, avocado). ? Consuming fish 2 or more times weekly. ? Daily-weekly consumption of dairy, poultry, and eggs  ? Limit processed meat, processed carbohydrates and fried foods  ? Water as primary beverage     2. Cardioprotective Nutrition Education:   · Reviewed lab/body comp results/goals, and nutrition modifications that will support improvements in body composition and lab values. · Weight loss strategies reviewed, including sources of empty calories and portion sizes. -- recommended cutting back for empty calories from beverages today. Select water. · Educated patient on cardioprotective meal pattern/Mediterranean meal pattern including  ? Guidelines for saturated fat (<7% total calories), sodium ( < 2000mg/day or follow MD recommendations), and added sugars ( < 25 grams for women/<36 grams for men) and high sources of each reviewed  ? Consumption of daily fiber intake with emphasis on 7-13 grams of soluble fiber daily and food sources reviewed. ? Demonstrated food label reading for home use to support self efficacy goals. ? Sodium education; \"salty six\" foods, food label reading, low sodium food sources and meal planning. ? Meal planning- supported barriers to preparing meals at home. Reviewed tips to reduce convenience eating. - recipe provided      Handouts provided for home use:   · Heart healthy eating pattern with 1 day sample menu. · Heart healthy recipes, recipe modification tips. · [place] plate method  · Tips for reducing sodium    Nutrition Goals:    · 5-10% weight loss by increasing f/v to 5 servings per day, eating 3 meals per day, and following meal plan by end of cardiopulmonary rehabilitation. - progressing towards goals today. 1% weight loss since last assessment      Monitoring/Evaluation: RD to follow up with participant during rehab sessions for questions and assessment of progression toward goals.     Anticipated Compliance: Good: motivated to lose weight. Provided macronutrient needs and meal plan to optimize nutrition.    Pt verbalizes understanding to recommendations discussed.      Barriers: none- spouse supporting him.      Lashaun Ramirez, MS, RD, LD  Cardiopulmonary Rehab Dietitian

## 2021-07-05 ENCOUNTER — APPOINTMENT (OUTPATIENT)
Dept: CARDIAC REHAB | Age: 61
End: 2021-07-05
Payer: COMMERCIAL

## 2021-07-07 ENCOUNTER — HOSPITAL ENCOUNTER (OUTPATIENT)
Dept: CARDIAC REHAB | Age: 61
Discharge: HOME | End: 2021-07-07
Payer: COMMERCIAL

## 2021-07-07 VITALS — BODY MASS INDEX: 29.13 KG/M2 | WEIGHT: 220.8 LBS

## 2021-07-07 PROCEDURE — G0239 OTH RESP PROC, GROUP: HCPCS

## 2021-07-12 ENCOUNTER — HOSPITAL ENCOUNTER (OUTPATIENT)
Dept: CARDIAC REHAB | Age: 61
Discharge: HOME | End: 2021-07-12
Payer: COMMERCIAL

## 2021-07-12 VITALS — BODY MASS INDEX: 29.24 KG/M2 | WEIGHT: 221.6 LBS

## 2021-07-12 PROCEDURE — G0239 OTH RESP PROC, GROUP: HCPCS

## 2021-07-26 ENCOUNTER — HOSPITAL ENCOUNTER (OUTPATIENT)
Dept: CARDIAC REHAB | Age: 61
Discharge: HOME | End: 2021-07-26
Payer: COMMERCIAL

## 2021-07-26 VITALS — WEIGHT: 221 LBS | BODY MASS INDEX: 29.16 KG/M2

## 2021-07-26 PROCEDURE — G0239 OTH RESP PROC, GROUP: HCPCS

## 2021-07-28 ENCOUNTER — HOSPITAL ENCOUNTER (OUTPATIENT)
Dept: CARDIAC REHAB | Age: 61
Discharge: HOME | End: 2021-07-28
Payer: COMMERCIAL

## 2021-07-28 PROCEDURE — G0239 OTH RESP PROC, GROUP: HCPCS

## 2021-08-02 ENCOUNTER — HOSPITAL ENCOUNTER (OUTPATIENT)
Dept: CARDIAC REHAB | Age: 61
Discharge: HOME | End: 2021-08-02
Payer: COMMERCIAL

## 2021-08-02 VITALS — BODY MASS INDEX: 29.34 KG/M2 | WEIGHT: 222.4 LBS

## 2021-08-02 PROCEDURE — G0239 OTH RESP PROC, GROUP: HCPCS

## 2021-08-04 ENCOUNTER — HOSPITAL ENCOUNTER (OUTPATIENT)
Dept: CARDIAC REHAB | Age: 61
Discharge: HOME | End: 2021-08-04
Payer: COMMERCIAL

## 2021-08-04 PROCEDURE — G0239 OTH RESP PROC, GROUP: HCPCS

## 2021-08-06 ENCOUNTER — HOSPITAL ENCOUNTER (OUTPATIENT)
Dept: LAB | Age: 61
Discharge: HOME | End: 2021-08-06
Payer: COMMERCIAL

## 2021-08-06 DIAGNOSIS — I10 ESSENTIAL HYPERTENSION: Chronic | ICD-10-CM

## 2021-08-06 PROBLEM — R94.39 ABNORMAL NUCLEAR STRESS TEST: Status: ACTIVE | Noted: 2021-08-06

## 2021-08-06 LAB
ANION GAP SERPL CALC-SCNC: 5 MMOL/L (ref 7–16)
BASOPHILS # BLD: 0 K/UL (ref 0–0.2)
BASOPHILS NFR BLD: 1 % (ref 0–2)
BUN SERPL-MCNC: 18 MG/DL (ref 8–23)
CALCIUM SERPL-MCNC: 9.2 MG/DL (ref 8.3–10.4)
CHLORIDE SERPL-SCNC: 111 MMOL/L (ref 98–107)
CO2 SERPL-SCNC: 25 MMOL/L (ref 21–32)
CREAT SERPL-MCNC: 1.25 MG/DL (ref 0.8–1.5)
DIFFERENTIAL METHOD BLD: ABNORMAL
EOSINOPHIL # BLD: 0.2 K/UL (ref 0–0.8)
EOSINOPHIL NFR BLD: 4 % (ref 0.5–7.8)
ERYTHROCYTE [DISTWIDTH] IN BLOOD BY AUTOMATED COUNT: 15.6 % (ref 11.9–14.6)
GLUCOSE SERPL-MCNC: 87 MG/DL (ref 65–100)
HCT VFR BLD AUTO: 39.5 % (ref 41.1–50.3)
HGB BLD-MCNC: 13 G/DL (ref 13.6–17.2)
IMM GRANULOCYTES # BLD AUTO: 0 K/UL (ref 0–0.5)
IMM GRANULOCYTES NFR BLD AUTO: 0 % (ref 0–5)
LYMPHOCYTES # BLD: 2.1 K/UL (ref 0.5–4.6)
LYMPHOCYTES NFR BLD: 42 % (ref 13–44)
MCH RBC QN AUTO: 28.4 PG (ref 26.1–32.9)
MCHC RBC AUTO-ENTMCNC: 32.9 G/DL (ref 31.4–35)
MCV RBC AUTO: 86.2 FL (ref 79.6–97.8)
MONOCYTES # BLD: 0.4 K/UL (ref 0.1–1.3)
MONOCYTES NFR BLD: 8 % (ref 4–12)
NEUTS SEG # BLD: 2.3 K/UL (ref 1.7–8.2)
NEUTS SEG NFR BLD: 46 % (ref 43–78)
NRBC # BLD: 0 K/UL (ref 0–0.2)
PLATELET # BLD AUTO: 242 K/UL (ref 150–450)
PMV BLD AUTO: 10 FL (ref 9.4–12.3)
POTASSIUM SERPL-SCNC: 4.4 MMOL/L (ref 3.5–5.1)
RBC # BLD AUTO: 4.58 M/UL (ref 4.23–5.6)
SODIUM SERPL-SCNC: 141 MMOL/L (ref 136–145)
WBC # BLD AUTO: 5.1 K/UL (ref 4.3–11.1)

## 2021-08-06 PROCEDURE — 85025 COMPLETE CBC W/AUTO DIFF WBC: CPT

## 2021-08-06 PROCEDURE — 80048 BASIC METABOLIC PNL TOTAL CA: CPT

## 2021-08-06 PROCEDURE — 36415 COLL VENOUS BLD VENIPUNCTURE: CPT

## 2021-08-09 ENCOUNTER — HOSPITAL ENCOUNTER (OUTPATIENT)
Dept: CARDIAC REHAB | Age: 61
Discharge: HOME | End: 2021-08-09
Payer: COMMERCIAL

## 2021-08-09 VITALS — BODY MASS INDEX: 29.16 KG/M2 | WEIGHT: 221 LBS

## 2021-08-09 PROCEDURE — G0239 OTH RESP PROC, GROUP: HCPCS

## 2021-08-10 NOTE — PROGRESS NOTES
Tried to call to pre-assess patient. Unable to connect to phone number provided. Have tried all numbers in patient's chart, including spouse's #. No message left.

## 2021-08-11 ENCOUNTER — HOSPITAL ENCOUNTER (OUTPATIENT)
Age: 61
Setting detail: OUTPATIENT SURGERY
Discharge: HOME | End: 2021-08-11
Attending: INTERNAL MEDICINE | Admitting: INTERNAL MEDICINE
Payer: COMMERCIAL

## 2021-08-11 VITALS
WEIGHT: 200 LBS | HEART RATE: 74 BPM | DIASTOLIC BLOOD PRESSURE: 96 MMHG | SYSTOLIC BLOOD PRESSURE: 137 MMHG | OXYGEN SATURATION: 97 % | HEIGHT: 72 IN | BODY MASS INDEX: 27.09 KG/M2

## 2021-08-11 DIAGNOSIS — I10 ESSENTIAL HYPERTENSION: ICD-10-CM

## 2021-08-11 DIAGNOSIS — R94.39 ABNORMAL NUCLEAR STRESS TEST: ICD-10-CM

## 2021-08-11 DIAGNOSIS — I42.0 DILATED CARDIOMYOPATHY (HCC): ICD-10-CM

## 2021-08-11 LAB
ATRIAL RATE: 57 BPM
CALCULATED P AXIS, ECG09: 41 DEGREES
CALCULATED R AXIS, ECG10: 45 DEGREES
CALCULATED T AXIS, ECG11: 41 DEGREES
DIAGNOSIS, 93000: NORMAL
P-R INTERVAL, ECG05: 178 MS
Q-T INTERVAL, ECG07: 436 MS
QRS DURATION, ECG06: 110 MS
QTC CALCULATION (BEZET), ECG08: 424 MS
VENTRICULAR RATE, ECG03: 57 BPM

## 2021-08-11 PROCEDURE — 74011250636 HC RX REV CODE- 250/636: Performed by: INTERNAL MEDICINE

## 2021-08-11 PROCEDURE — 76210000024 HC REC RM PH II 2.5 TO 3 HR: Performed by: INTERNAL MEDICINE

## 2021-08-11 PROCEDURE — 74011000250 HC RX REV CODE- 250: Performed by: INTERNAL MEDICINE

## 2021-08-11 PROCEDURE — 99152 MOD SED SAME PHYS/QHP 5/>YRS: CPT | Performed by: INTERNAL MEDICINE

## 2021-08-11 PROCEDURE — C1769 GUIDE WIRE: HCPCS | Performed by: INTERNAL MEDICINE

## 2021-08-11 PROCEDURE — 93458 L HRT ARTERY/VENTRICLE ANGIO: CPT | Performed by: INTERNAL MEDICINE

## 2021-08-11 PROCEDURE — 93005 ELECTROCARDIOGRAM TRACING: CPT | Performed by: INTERNAL MEDICINE

## 2021-08-11 PROCEDURE — 74011250637 HC RX REV CODE- 250/637: Performed by: INTERNAL MEDICINE

## 2021-08-11 PROCEDURE — 77030042317 HC BND COMPR HEMSTAT -B: Performed by: INTERNAL MEDICINE

## 2021-08-11 PROCEDURE — 77030016699 HC CATH ANGI DX INFN1 CARD -A: Performed by: INTERNAL MEDICINE

## 2021-08-11 PROCEDURE — 77030015766: Performed by: INTERNAL MEDICINE

## 2021-08-11 PROCEDURE — C1894 INTRO/SHEATH, NON-LASER: HCPCS | Performed by: INTERNAL MEDICINE

## 2021-08-11 PROCEDURE — 74011000636 HC RX REV CODE- 636: Performed by: INTERNAL MEDICINE

## 2021-08-11 RX ORDER — GUAIFENESIN 100 MG/5ML
81-324 LIQUID (ML) ORAL
Status: COMPLETED | OUTPATIENT
Start: 2021-08-11 | End: 2021-08-11

## 2021-08-11 RX ORDER — HEPARIN SODIUM 200 [USP'U]/100ML
INJECTION, SOLUTION INTRAVENOUS
Status: COMPLETED | OUTPATIENT
Start: 2021-08-11 | End: 2021-08-11

## 2021-08-11 RX ORDER — MIDAZOLAM HYDROCHLORIDE 1 MG/ML
INJECTION, SOLUTION INTRAMUSCULAR; INTRAVENOUS AS NEEDED
Status: DISCONTINUED | OUTPATIENT
Start: 2021-08-11 | End: 2021-08-11 | Stop reason: HOSPADM

## 2021-08-11 RX ORDER — LIDOCAINE HYDROCHLORIDE 10 MG/ML
INJECTION INFILTRATION; PERINEURAL AS NEEDED
Status: DISCONTINUED | OUTPATIENT
Start: 2021-08-11 | End: 2021-08-11 | Stop reason: HOSPADM

## 2021-08-11 RX ORDER — SODIUM CHLORIDE 9 MG/ML
75 INJECTION, SOLUTION INTRAVENOUS CONTINUOUS
Status: DISCONTINUED | OUTPATIENT
Start: 2021-08-11 | End: 2021-08-11 | Stop reason: HOSPADM

## 2021-08-11 RX ADMIN — SODIUM CHLORIDE 75 ML/HR: 900 INJECTION, SOLUTION INTRAVENOUS at 06:51

## 2021-08-11 RX ADMIN — ASPIRIN 324 MG: 81 TABLET, CHEWABLE ORAL at 06:49

## 2021-08-11 NOTE — PROGRESS NOTES
Terumo band completely deflated. 1000 Terumo band removed from right wrist using sterile technique. Sterile dressing applied. No signs and symptoms of bleeding, oozing or hematoma.

## 2021-08-11 NOTE — Clinical Note
TRANSFER - OUT REPORT:     Verbal report given to: cpru rn. Report consisted of patient's Situation, Background, Assessment and   Recommendations(SBAR). Opportunity for questions and clarification was provided. Patient transported with a Cardiac Cath Tech / Patient Care Tech. Patient transported to: cpru.

## 2021-08-11 NOTE — DISCHARGE INSTRUCTIONS
HEART CATHETERIZATION/ANGIOGRAPHY DISCHARGE INSTRUCTIONS    1. Check puncture site frequently for swelling or bleeding. If there is any bleeding, lie down and apply pressure over the area with a clean towel or washcloth and call 911. Notify your doctor for any redness, swelling, drainage, or oozing from the puncture site. Notify your doctor for any fever or chills. 2. If the extremity becomes cold, numb, or painful call Dr. Riccardo Mendez at [phone].  3. Activity should be limited for the next 48 hours. Avoid pushing, pulling and bending of affected wrist for 48 hours. No heavy lifting (anything over 5 pounds) for 3 days. No driving for 48 hours. 4. You may resume your usual diet. Drink more fluids than usual.  5. Have a responsible person drive you home and stay with you for at least 24 hours after your heart catheterization/angiography. 6. You may remove bandage from your right arm  in 24 hours. You may shower in 24 hours. No tub baths, hot tubs, or swimming for 1 week. Do not place any lotions, creams, powders, or ointments over puncture site for 1 week. You may place a clean band-aid over the puncture site each day for 5 days. Change daily. I have read the above instructions and have had the opportunity to ask questions.

## 2021-08-11 NOTE — H&P
Amadou Santana MD   Physician   Specialty:  Cardiology   Progress Notes       Signed   Encounter Date:  2021                    []Yuri copied text    []Yg for details          [facility]  [address], [address]  PHONE: [phone]        21     NAME:  Chelsea Mcpherson  : 1960  MRN: [id number]            SUBJECTIVE:   Chelsea Mcpherson is a 62 y.o. male seen for a follow up visit regarding the following:           Chief Complaint   Patient presents with    Results       Had Nuke         HPI:    He is here in f/u on his cardiomyopathy and recent CV arrest with Covid. He has strong family history of CAD. Nuke stress was abnormal albeit mild on my review. EF 45%   He is having no chest pain. He has been noted to have low blood pressure at rehab pulmonary .          Past Medical History, Past Surgical History, Family history, Social History, and Medications were all reviewed with the patient today and updated as necessary.              Prior to Admission medications    Medication Sig Start Date End Date Taking? Authorizing Provider   furosemide (LASIX) 20 mg tablet Take 1 Tablet by mouth daily. 21   Yes Kiran Cardenas MD   spironolactone (ALDACTONE) 25 mg tablet Take 1 Tablet by mouth daily. 21   Yes Kiran Cardenas MD   amLODIPine (NORVASC) 2.5 mg tablet Take 1 Tablet by mouth daily. 21   Yes Kiran Cardenas MD   carvediloL (COREG) 25 mg tablet Take 1 Tablet by mouth two (2) times daily (with meals). 21   Yes Kiran Cardenas MD   cpap machine kit by Does Not Apply route.     Yes Provider, Historical   cholecalciferol, vitamin D3, 50 mcg (2,000 unit) tab Take 1 tablet (2,000 Units) by mouth daily 21   Yes Provider, Historical   citalopram (CELEXA) 10 mg tablet Take 1 Tablet by mouth daily.  21   Yes Mohit Field MD   lisinopriL (PRINIVIL, ZESTRIL) 20 mg tablet TAKE 1 TABLET BY MOUTH IN THE MORNING 21   Yes Provider, Historical   flash glucose scanning reader (FreeStyle Malissa 14 Day [place]) [facility] malissa reader; check fs every day; DM2, E11.9 5/17/21   Yes Marisela Garcia MD   flash glucose sensor (FreeStyle Malissa 14 Day Sensor) kit [facility]; check fs every day, DM2, E11.9 5/17/21   Yes Marisela Garcia MD   metFORMIN ER (GLUCOPHAGE XR) 500 mg tablet Take 1 Tab by mouth daily (with dinner). 5/17/21   Yes Marisela Garcia MD   potassium chloride (KLOR-CON) 10 mEq tablet Take 1 Tab by mouth daily as needed for Other (edema). Patient taking differently: Take 10 mEq by mouth daily. 4/12/21   Yes Marisela Garcia MD   Oxygen 1-2 L     Yes Provider, Historical   DISABLED PLACARD (DISABLED PLACARD) DMV Disabled placard, ERICA, chronic gout 3/17/21   Yes Marisela Garcia MD   acetaminophen (TYLENOL) 325 mg tablet Take 2 tablets (650 mg) by mouth every 6 (six) hours as needed for mild pain, moderate pain, headaches or fever 2/23/21   Yes Provider, Historical   levETIRAcetam 1,000 mg tablet Take 1 tablet (1,000 mg) by mouth 2 (two) times a day 2/23/21   Yes Provider, Historical   therapeutic multivitamin [facility]) tablet Take 1 tablet by mouth daily Do not start before February 24, 2021. 2/24/21   Yes Provider, Historical   thiamine HCL (B-1) 100 mg tablet Take 1 tablet (100 mg) by mouth daily Do not start before February 24, 2021. 2/24/21   Yes Provider, Historical   atorvastatin (LIPITOR) 40 mg tablet Take 1 Tab by mouth daily.  11/2/20   Yes Marisela Garcia MD   aspirin delayed-release (ASPIRIN LOW DOSE) 81 mg tablet Take 81 mg by mouth every seven (7) days.     Yes Provider, Historical           Allergies   Allergen Reactions    Sunflower Seed Anaphylaxis    Pine Nut Swelling           Past Medical History:   Diagnosis Date    Arthritis      CHF (congestive heart failure) [facility] [address] 7/20/2014     with acute/chronic systolic CHF in face of malignant hypertension and polysubstance abuse to include alcohol, cocaine    CHF (congestive heart failure) ([facility] [address] 2/20/15     echo: LVEF 50-55%, mild to mod . MR    Chronic kidney disease       stage III per cardiology ov note 2/25/15 (cr. 1.5)    COVID-19 virus infection 1/27/2021    Diverticulosis of large intestine without diverticulitis 2016    History of alcohol abuse 7/22/2014    History of drug abuse ([facility] [address] 7/22/14    History of echocardiogram echo: 2/20/15     LVEF 50-55%, mild to mod. MR, mild to moderate concentric LV hypertrophy.  Hx of colonic polyps 2016    Hypertension      IFG (impaired fasting glucose) 12/23/2019    Persistent disorder of initiating or maintaining sleep 1/28/2019    Seizures (HCC)      Sleep apnea              Past Surgical History:   Procedure Laterality Date    HX COLONOSCOPY   last 2/8/16     Reina--five small sigmoid polyps--5-7 year recall            Family History   Problem Relation Age of Onset    Alcohol abuse Mother      Heart Attack Other      Heart Disease Brother 54      Social History           Tobacco Use    Smoking status: Never Smoker    Smokeless tobacco: Never Used   Substance Use Topics    Alcohol use: Not Currently         ROS:     ROS         PHYSICAL EXAM:     Visit Vitals  /70   Pulse 62   Ht 6' 1\" (1.854 m)   Wt 224 lb (101.6 kg)   BMI 29.55 kg/m²                Wt Readings from Last 3 Encounters:   08/06/21 224 lb (101.6 kg)   08/02/21 222 lb 6.4 oz (100.9 kg)   07/26/21 221 lb (100.2 kg)          BP Readings from Last 3 Encounters:   08/06/21 116/70   07/13/21 (!) 150/90   06/30/21 (!) 142/96            Physical Exam  Constitutional:       Appearance: He is well-developed. HENT:      Head: Normocephalic. Neck:      Vascular: No carotid bruit or JVD. Cardiovascular:      Rate and Rhythm: Normal rate and regular rhythm. Heart sounds: Normal heart sounds. Pulmonary:      Effort: Pulmonary effort is normal.      Breath sounds: Normal breath sounds. Abdominal:      Palpations: Abdomen is soft.    Skin:     General: Skin is warm and dry. Neurological:      Mental Status: He is alert.          EKG     Medical problems and test results were reviewed with the patient today.      DATA REVIEW        LIPID PANEL        Recent Labs     05/10/21  0811 04/02/21  0838   CHOL 176 204*   HDL 48 54   LDLC 106.6* 134*   TRIGL 107 90   CHHD 3.7  --             BMP            Recent Labs     05/10/21  0811 05/04/21  0821 04/19/21  2044 04/02/21  0838 10/20/20  0829 10/20/20  0829    141   < > 143   < > 140   K 3.5 3.5   < > 3.8   < > 4.1    110*   < > 105   < > 106   CO2 28 28   < > 25   < > 25   AGAP 6* 3*   < >  --   --   --    * 105*   < > 110*   < > 92   BUN 16 9   < > 9   < > 18   CREA 1.13 1.10   < > 1.04   < > 1.16   BUCR  --   --   --  9  --  16   GFRAA >60 >60   < > 90   < > 79   GFRNA >60 >60   < > 78   < > 69   CA 9.5 9.1   < > 9.4   < > 9.4    < > = values in this interval not displayed.                           OUTSIDE RECORDS REVIEW     Records from outside providers have been reviewed and summarized as noted in the HPI, past history and data review sections of this note.         ASSESSMENT and PLAN     Diagnoses and all orders for this visit:     1. Mixed hyperlipidemia     2. Essential hypertension blood pressure in lower  range continue current medications low salt diet and monitor for goal in 120/80 range     -     spironolactone (ALDACTONE) 25 mg tablet; Take 1 Tablet by mouth daily. -     amLODIPine (NORVASC) 2.5 mg tablet; Take 1 Tablet by mouth daily. reduced dose on Norvasc for   -     CARDIAC PROCEDURE  -     CBC WITH AUTOMATED DIFF; Future  -     METABOLIC PANEL, BASIC; Future     3. Dilated cardiomyopathy (HCC)  -     furosemide (LASIX) 20 mg tablet; Take 1 Tablet by mouth daily. -     CARDIAC PROCEDURE     4.  Abnormal nuclear stress test   with his arrest while having Covid pnae     and abnormal nuke a cardiac cath is recommended   -     CARDIAC PROCEDURE  Cardiac cath procedure with risks [death,MI,stroke as well as bleeding risk ]  and options reviewed. Questions answered. Patient reviewed and  signed consent. Agreeable to  proceed.                        Follow-up and Dispositions  ·   Return in about 6 months (around 2/6/2022).            Pt set up for procedure. Risks benefits and alternatives discussed. Pt agrees to proceed. Risks of bleeding infection emergent surgical procedure loss of life or limb renal failure and other known risks discussed. Pt agrees to proceed and agrees to sign consent form. Current consent form has been signed and visualized and is completed in its entirety by all involved parties.   Actual scanning of the paper consent into the chart takes place at a later date and is a process that I am not involved in nor can be held responsible for.          Ines Ye MD  8/6/2021  11:54 AM      Electronically signed by Domenic Cortes MD at 08/06/21 1221  Note Details    Author Domenic Cortes MD File Time 08/06/21 1221   Author Type Physician Status Signed   Last  Domenic Cortes MD Specialty Cardiology    Office Visit on 8/6/2021     Office Visit on 8/6/2021        Detailed Report      Note shared with patient

## 2021-08-11 NOTE — Clinical Note
Contrast Dose Calculator:   Patient's age: 62.   Patient's sex: Male. Patient weight (kg) = 99.8. Creatinine level (mg/dL) = 1.25. Creatinine clearance (mL/min): 89.   Contrast concentration (mg/mL) = 370. MACD = 300 mL. Max Contrast dose per Creatinine Cl calculator = 300 mL.

## 2021-08-11 NOTE — ROUTINE PROCESS
Patient received to [address] [place] [address] room # 10  Ambulatory from [facility]. Patient scheduled for [facility] today with Dr Harika Gamboa. Procedure reviewed & questions answered, voiced good understanding consent obtained & placed on chart. All medications and medical history reviewed. Will prep patient per orders. Patient & family updated on plan of care.       The patient has a fraility score of 3-MANAGING WELL, based on independent of adl's

## 2021-08-11 NOTE — PROGRESS NOTES
TRANSFER - OUT REPORT:    [facility]  Dr. Eunice HERRERA w/ hemoband \"12\", site with no signs of bleeding or hematoma    Diagnostic cath    IV Versed 2 mg  Heparin 2,000 units (cocktail)      Verbal report given to [facility] RN(name) on Loren Chase  being transferred to CPRU(unit) for routine progression of care       Report consisted of patients Situation, Background, Assessment and   Recommendations(SBAR). Information from the following report(s) SBAR, Procedure Summary and MAR was reviewed with the receiving nurse. Lines:   Peripheral IV 08/11/21 Right Antecubital (Active)        Opportunity for questions and clarification was provided.       Patient transported with:   Registered Nurse

## 2021-08-11 NOTE — PROGRESS NOTES
Report received from [address] [place] [address]. Procedural findings communicated. Intra procedural  medication administration reviewed. Progression of care discussed.      Patient received into [address] [facility] 7 post sheath removal.     Access site without bleeding or swelling yes    Dressing dry and intact yes    Patient instructed to limit movement to right upper extremity    Routine post procedural vital signs and site assessment initiated yes

## 2021-08-11 NOTE — PROGRESS NOTES
Patient up to bedside, vital signs and site stable. Patient ambulated to bathroom without difficulty. Patient voided without difficulty. Vascular site stable. Discharge instructions and home medications reviewed with patient. Time allowed for questions and answers. Site stable after ambulation. Peripheral IV sites dc'd without difficulty with tips intact. 1021 Patient discharged to home with family.

## 2021-08-18 ENCOUNTER — HOSPITAL ENCOUNTER (OUTPATIENT)
Dept: CARDIAC REHAB | Age: 61
Discharge: HOME | End: 2021-08-18
Payer: COMMERCIAL

## 2021-08-18 PROCEDURE — G0239 OTH RESP PROC, GROUP: HCPCS

## 2021-08-23 ENCOUNTER — APPOINTMENT (OUTPATIENT)
Dept: CARDIAC REHAB | Age: 61
End: 2021-08-23
Payer: COMMERCIAL

## 2021-08-25 ENCOUNTER — HOSPITAL ENCOUNTER (OUTPATIENT)
Dept: CARDIAC REHAB | Age: 61
Discharge: HOME | End: 2021-08-25
Payer: COMMERCIAL

## 2021-08-25 VITALS — WEIGHT: 221.2 LBS | BODY MASS INDEX: 30 KG/M2

## 2021-08-25 PROCEDURE — G0239 OTH RESP PROC, GROUP: HCPCS

## 2021-08-30 ENCOUNTER — APPOINTMENT (OUTPATIENT)
Dept: CARDIAC REHAB | Age: 61
End: 2021-08-30
Payer: COMMERCIAL

## 2021-09-08 ENCOUNTER — HOSPITAL ENCOUNTER (OUTPATIENT)
Dept: CARDIAC REHAB | Age: 61
Discharge: HOME | End: 2021-09-08
Payer: COMMERCIAL

## 2021-09-08 VITALS — WEIGHT: 221 LBS | BODY MASS INDEX: 29.97 KG/M2

## 2021-09-08 PROCEDURE — G0239 OTH RESP PROC, GROUP: HCPCS

## 2021-09-13 ENCOUNTER — HOSPITAL ENCOUNTER (OUTPATIENT)
Dept: CARDIAC REHAB | Age: 61
Discharge: HOME | End: 2021-09-13
Payer: COMMERCIAL

## 2021-09-13 VITALS — WEIGHT: 225 LBS | BODY MASS INDEX: 30.52 KG/M2

## 2021-09-13 PROCEDURE — G0239 OTH RESP PROC, GROUP: HCPCS

## 2021-09-15 ENCOUNTER — HOSPITAL ENCOUNTER (OUTPATIENT)
Dept: CARDIAC REHAB | Age: 61
Discharge: HOME | End: 2021-09-15
Payer: COMMERCIAL

## 2021-09-20 ENCOUNTER — APPOINTMENT (OUTPATIENT)
Dept: CARDIAC REHAB | Age: 61
End: 2021-09-20
Payer: COMMERCIAL

## 2021-09-22 ENCOUNTER — HOSPITAL ENCOUNTER (OUTPATIENT)
Dept: CARDIAC REHAB | Age: 61
Discharge: HOME | End: 2021-09-22
Payer: COMMERCIAL

## 2021-09-22 VITALS — BODY MASS INDEX: 30.52 KG/M2 | WEIGHT: 225 LBS

## 2021-09-22 PROCEDURE — G0239 OTH RESP PROC, GROUP: HCPCS

## 2021-09-29 ENCOUNTER — HOSPITAL ENCOUNTER (OUTPATIENT)
Dept: CARDIAC REHAB | Age: 61
Discharge: HOME | End: 2021-09-29
Payer: COMMERCIAL

## 2021-09-29 VITALS — BODY MASS INDEX: 30.46 KG/M2 | WEIGHT: 224.6 LBS

## 2021-09-29 PROCEDURE — G0239 OTH RESP PROC, GROUP: HCPCS

## 2021-10-04 ENCOUNTER — HOSPITAL ENCOUNTER (OUTPATIENT)
Dept: CARDIAC REHAB | Age: 61
Discharge: HOME | End: 2021-10-04
Payer: COMMERCIAL

## 2021-10-04 VITALS — BODY MASS INDEX: 30 KG/M2 | WEIGHT: 221.2 LBS

## 2021-10-04 PROCEDURE — G0239 OTH RESP PROC, GROUP: HCPCS

## 2021-10-12 PROBLEM — F32.9 REACTIVE DEPRESSION: Status: ACTIVE | Noted: 2021-10-12

## 2021-10-12 PROBLEM — R73.01 IFG (IMPAIRED FASTING GLUCOSE): Status: RESOLVED | Noted: 2019-12-23 | Resolved: 2021-10-12

## 2021-10-12 PROBLEM — I25.2 HISTORY OF MI (MYOCARDIAL INFARCTION): Status: ACTIVE | Noted: 2021-06-01

## 2021-11-09 ENCOUNTER — HOSPITAL ENCOUNTER (OUTPATIENT)
Dept: GENERAL RADIOLOGY | Age: 61
Discharge: HOME | End: 2021-11-09
Payer: COMMERCIAL

## 2021-11-09 DIAGNOSIS — M54.2 CERVICALGIA: ICD-10-CM

## 2021-11-09 DIAGNOSIS — M50.30 DDD (DEGENERATIVE DISC DISEASE), CERVICAL: ICD-10-CM

## 2021-11-09 PROCEDURE — 72050 X-RAY EXAM NECK SPINE 4/5VWS: CPT

## 2021-11-10 NOTE — PROGRESS NOTES
Cspine xray revealed multilevel degenerative spondylosis and lower cervical foraminal stenosis; is he ok w/ me ordering MRI? Does he have any metal in his body?

## 2021-11-11 ENCOUNTER — HOSPITAL ENCOUNTER (OUTPATIENT)
Dept: PHYSICAL THERAPY | Age: 61
Discharge: HOME | End: 2021-11-11
Attending: INTERNAL MEDICINE
Payer: COMMERCIAL

## 2021-11-11 DIAGNOSIS — M54.2 CERVICALGIA: ICD-10-CM

## 2021-11-11 DIAGNOSIS — M50.30 DDD (DEGENERATIVE DISC DISEASE), CERVICAL: ICD-10-CM

## 2021-11-11 PROCEDURE — 97140 MANUAL THERAPY 1/> REGIONS: CPT

## 2021-11-11 PROCEDURE — 97162 PT EVAL MOD COMPLEX 30 MIN: CPT

## 2021-11-11 PROCEDURE — 97110 THERAPEUTIC EXERCISES: CPT

## 2021-11-11 NOTE — THERAPY EVALUATION
Juliet Ramos  : 1960  Primary: Shelton Urszula Of [place]*  Secondary:  Therapy Center at [facility] [address], [address] [facility] [place] [facility]", [address]  [phone]   [phone]          OUTPATIENT PHYSICAL THERAPY:Initial Assessment 2021   ICD-10: Treatment Diagnosis:   neck pain (M54.2)   Treatment Diagnosis 2: radiculopathy, cervical region (M54.12)  Muscle weakness (generalized) (M62.81); Precautions/Allergies:   Sunflower seed and Pine nut   TREATMENT PLAN:  Effective Dates: 2021 TO 2022 (90 days). Frequency/Duration: 2 times a week for 90 Day(s) MEDICAL/REFERRING DIAGNOSIS:  Cervicalgia [M54.2]  DDD (degenerative disc disease), cervical [M50.30]   DATE OF ONSET: 6mos ago  REFERRING PHYSICIAN: Ailyn Gutiérrez MD MD Orders: eval treat  Return MD Appointment: unknown     INITIAL ASSESSMENT:  Mr. Va Varela presents with concurrent weakness and pain in the cervical region with scapular/thoracic weakness and stiffness with limitations of functional head motions needed for driving, reaching, sleeping and home ADL's. Decreased cervical ROM, limited segmental motion and scapular weakness are present. Skilled and sophisticated PT is indicated for functional mobility deficits.        PROBLEM LIST (Impacting functional limitations):  Decreased Strength  Decreased ADL/Functional Activities  Decreased Transfer Abilities  Decreased Balance  Increased Pain  Decreased Activity Tolerance  Decreased Flexibility/Joint Mobility INTERVENTIONS PLANNED: (Treatment may consist of any combination of the following)  Balance Exercise  Bed Mobility  Electrical Stimulation  Gait Training  Heat  Home Exercise Program (HEP)  Manual Therapy  Neuromuscular Re-education/Strengthening  Range of Motion (ROM)  Therapeutic Activites  Therapeutic Exercise/Strengthening     GOALS: (Goals have been discussed and agreed upon with patient.)  Short-Term Functional Goals: Time Frame: 6 weeks  Pt will be independent with range of motion and strength home exercise program.  Pt will reduce score on NDI to 28/50 in order to demonstrate improved functional mobility and quality of life. Patient to demonstrate increase cervical AROM to 75% in all directions    DISCHARGE GOALS: Time Frame: 12 weeks                                                                                        1. Pt will reduce score on NDI to 20/50 in order to demonstrate improved functional mobility and quality of life. 2. Patient to demonstrate no symptoms during ADLs in over 1 week     3. Patient to demonstrate increase cervical AROM to 100% in all directions    OUTCOME MEASURE:   Tool Used: Modified Oswestry Low Back Pain Questionnaire  Score:  Initial: 37/50  Most Recent: X/50 (Date: -- )   Interpretation of Score: Each section is scored on a 0-5 scale, 5 representing the greatest disability. The scores of each section are added together for a total score of 50. MEDICAL NECESSITY:   Skilled intervention continues to be required due to decreased function. REASON FOR SERVICES/OTHER COMMENTS:  Patient continues to require skilled intervention due to medical complications and patient unable to attend/participate in therapy as expected. Total Duration:  PT Patient Time In/Time Out  Time In: 0845  Time Out: 0930    Rehabilitation Potential For Stated Goals: Good  Regarding Sophie Londono's therapy, I certify that the treatment plan above will be carried out by a therapist or under their direction. Thank you for this referral,  Mack Gautam DPT     Referring Physician Signature: Elier Schulz MD _______________________________ Date _____________     PAIN/SUBJECTIVE:   Initial:   4 Post Session:  3/10   HISTORY:   History of Injury/Illness (Reason for Referral):  Pt states beginning in Jan. 2021 he got COVID which effected heart and lung function.  Since then he has been working with several MDs to stabilize those organs and their condition. In doing so about 6mos ago he noticed an increase in cervical pain. Due to more important health issues he was unable to address cervical pain until other issues mentioned were stabilized. Now he is able to address neck pain. He reports that he has difficulty with sleeping in SLL and sleeps on R when it is not there preferred position, he is unable to work as  due to poor endurance needed in 8hr shift and need to perform tasks that require more head motion. He has difficulty with driving and turning head especially to R, he has to recruit family member to do some driving for him due to cervical pain and restrictions. He gets N/T in L fingers at C8 distribution. He also c/o of poor balance when walking around. Past Medical History/Comorbidities:   Mr. Ivana Rollins  has a past medical history of Arthritis, CHF (congestive heart failure) ([address] (7/20/2014), CHF (congestive heart failure) ([address] (2/20/15), Chronic kidney disease, COVID-19 virus infection (1/27/2021), Diverticulosis of large intestine without diverticulitis (2016), History of alcohol abuse (7/22/2014), History of drug abuse ([address] (7/22/14), History of echocardiogram (echo: 2/20/15), colonic polyps (2016), Hypertension, IFG (impaired fasting glucose) (12/23/2019), Persistent disorder of initiating or maintaining sleep (1/28/2019), Reactive depression (10/12/2021), Seizures ([address] and Sleep apnea. He also has no past medical history of Adverse effect of anesthesia, Aneurysm ([address] Coagulation disorder ([address] Dementia, Difficult intubation, Endocarditis, Ill-defined condition, Malignant hyperthermia due to anesthesia, Morbid obesity ([address] Nausea & vomiting, Neurological disorder, Other ill-defined conditions(799.89), Pseudocholinesterase deficiency, or Rheumatic fever. Mr. Ivana Rollins  has a past surgical history that includes hx colonoscopy (last 2/8/16).   Social History/Living Environment:     lives in house with family members, 1 story  Prior Level of Function/Work/Activity:  Prior to [place] sickness he was completely IND with all ADLs, no AD used, drives, worked at [employer] as   Dominant Side:         RIGHT   [place] [address] [facility] [address] [place]   Risk Factors:       (1)  Dizziness/Vertigo       (1)  Gender [Male]       (1)  Any administered benzodiazepines Ability to Rise from Chair:       (1)  Pushes up, successful in one attempt   Falls Prevention Plan:       No modifications necessary   Total: (5 or greater = High Risk): 4   ©2010 [facility] [place] States Patent #[id number]. Federal Law prohibits the replication, distribution or use without written permission from [facility] of [employer]   Current Medications:       Current Outpatient Medications:     albuterol (PROVENTIL HFA, VENTOLIN HFA, PROAIR HFA) 90 mcg/actuation inhaler, Take 1 Puff by inhalation every six (6) hours as needed for Wheezing., Disp: 1 Each, Rfl: 5    escitalopram oxalate (LEXAPRO) 5 mg tablet, Take 1 Tablet by mouth daily. , Disp: 90 Tablet, Rfl: 1    cyclobenzaprine (FLEXERIL) 5 mg tablet, Take 1 Tablet by mouth nightly as needed for Muscle Spasm(s) for up to 30 days. , Disp: 30 Tablet, Rfl: 1    carvediloL (COREG) 25 mg tablet, Take 1 Tablet by mouth two (2) times daily (with meals). , Disp: 180 Tablet, Rfl: 3    lisinopriL (PRINIVIL, ZESTRIL) 20 mg tablet, TAKE 1 TABLET BY MOUTH IN THE MORNING, Disp: 90 Tablet, Rfl: 3    furosemide (LASIX) 20 mg tablet, Take 1 Tablet by mouth daily. , Disp: 90 Tablet, Rfl: 3    spironolactone (ALDACTONE) 25 mg tablet, Take 1 Tablet by mouth daily. , Disp: 90 Tablet, Rfl: 1    cpap machine kit, by Does Not Apply route., Disp: , Rfl:     cholecalciferol, vitamin D3, 50 mcg (2,000 unit) tab, Take 1 tablet (2,000 Units) by mouth daily, Disp: , Rfl:     flash glucose scanning reader (FreeStyle Malissa 14 Day Blanch) misc, Freestyle malissa reader; check fs every day; DM2, E11.9, Disp: 1 Each, Rfl: 0    flash glucose sensor (FreeStyle Malissa 14 Day Sensor) kit, [facility]; check fs every day, DM2, E11.9, Disp: 3 Kit, Rfl: 5    metFORMIN ER (GLUCOPHAGE XR) 500 mg tablet, Take 1 Tab by mouth daily (with dinner). , Disp: 30 Tab, Rfl: 5    potassium chloride (KLOR-CON) 10 mEq tablet, Take 1 Tab by mouth daily as needed for Other (edema). (Patient taking differently: Take 10 mEq by mouth daily.), Disp: 90 Tab, Rfl: 1    Oxygen, 1-2 L, Disp: , Rfl:     DISABLED PLACARD (DISABLED PLACARD) DMV, Disabled placard, ERICA, chronic gout, Disp: 1 Each, Rfl: 0    acetaminophen (TYLENOL) 325 mg tablet, Take 2 tablets (650 mg) by mouth every 6 (six) hours as needed for mild pain, moderate pain, headaches or fever, Disp: , Rfl:     levETIRAcetam 1,000 mg tablet, Take 1 tablet (1,000 mg) by mouth 2 (two) times a day, Disp: , Rfl:     therapeutic multivitamin (THERAGRAN) tablet, Take 1 tablet by mouth daily Do not start before February 24, 2021., Disp: , Rfl:     thiamine HCL (B-1) 100 mg tablet, Take 1 tablet (100 mg) by mouth daily Do not start before February 24, 2021., Disp: , Rfl:     atorvastatin (LIPITOR) 40 mg tablet, Take 1 Tab by mouth daily. , Disp: 90 Tab, Rfl: 3    aspirin delayed-release (ASPIRIN LOW DOSE) 81 mg tablet, Take 81 mg by mouth every seven (7) days. , Disp: , Rfl:    Date Last Reviewed:  11/11/2021     Number of Personal Factors/Comorbidities that affect the Plan of Care: 1-2: MODERATE COMPLEXITY   EXAMINATION:   Observation/Orthostatic Postural Assessment:          Pt is stiff with motion, uses UEs for trnfs, shuffle gait with WBOS on heels  Palpation:          Somewhat tight bilat but R>L paracervical, SCM, traps, levators.    Balance:          MCTSIB=3/12, C1=3; C2 fail in 2sec, C3-4=UA  Range of Motion Tight end ranges Tight end ranges           Joint: Date: 11-11-21   Date:   Date:       Right Left Right Left Right Left   Side bending  33 72 Rotation  33 33           Fwb bend 33            Bwd bend  25             PROM limitations Restricted C3-7                             Body Structures Involved:  Nerves  Eyes and Ears  Bones  Joints  Muscles  Ligaments Body Functions Affected:  Mental  Sensory/Pain  Neuromusculoskeletal  Movement Related Activities and Participation Affected:  General Tasks and Demands  Mobility  Self Care  Domestic Life  Community, Social and [place]   Number of elements (examined above) that affect the Plan of Care: 3: MODERATE COMPLEXITY   CLINICAL PRESENTATION:   Presentation: Evolving clinical presentation with changing clinical characteristics: MODERATE COMPLEXITY   CLINICAL DECISION MAKING:   Use of outcome tool(s) and clinical judgement create a POC that gives a: Questionable prediction of patient's progress: MODERATE COMPLEXITY

## 2021-11-11 NOTE — PROGRESS NOTES
Navdeep Getting  : 1960  Primary: Sixto Gutierrez Of [place]*  Secondary:  Therapy Center at [facility]  [address] [facility], [address] [place] [address], [place], [facility] [facility]  [phone]   [phone]        OUTPATIENT PHYSICAL THERAPY: Daily Treatment Note 2021  Visit Count:  1    Cervical pain    Pre-treatment Symptoms/Complaints:  See eval  Pain: Initial:   4/10 Post Session:  4/10   Medications Last Reviewed:  2021  Updated Objective Findings:  See evaluation note from today  TREATMENT:     THERAPEUTIC EXERCISE: (10 minutes):  Exercises per grid below to improve mobility, strength, balance and coordination. Required maximal verbal cues to promote proper body alignment, promote proper body posture and promote proper body mechanics. Progressed resistance, range and complexity of movement as indicated. MANUAL THERAPY: (8 minutes): Joint mobilization, Soft tissue mobilization, Manipulation and Complex decongestive physiotherapy was utilized and necessary because of the patient's restricted joint motion, painful spasm, loss of articular motion and restricted motion of soft tissue. Date:  21 Date:   Date:     Activity/Exercise Parameters Parameters Parameters   Post neck stretch against wall [id number], [id number](in rot)     shld depreseeion In sitting 2 x30sec     manual Stretching, paracervical, traps, SCM R>L                                 MedBridge Portal  Treatment/Session Summary:    Response to Treatment:  pt had no HA after session, muscluar soreness about the same slight increase in rotation after manual stretching. Communication/Consultation:  None today  Equipment provided today:  None today  Recommendations/Intent for next treatment session: Next visit will focus on stretching, strengthening, modalities, balance.     Total Treatment Billable Duration:  20 minutes  PT Patient Time In/Time Out  Time In: 0063  Time Out: 974 Renata Lornez DPT    Future Appointments Date Time Provider Eileen Nunez   11/16/2021 10:15 AM Domi Hammonds, DPT [facility]   11/18/2021  8:45 AM Gallo Singh, ROSHNI [facility]   11/23/2021  9:30 AM Arkylele Cassius, DPT [facility]   11/30/2021  8:45 AM Domi Hammonds, DPT [facility]   12/2/2021  8:45 AM Gallo Singh PTA [facility]   12/6/2021  8:30 AM [facility]   12/14/2021  8:00 AM Majel Seip, MD [facility] PP PP   2/8/2022  8:45 AM [facility]   4/4/2022  9:20 AM MAT LAB [facility]   4/11/2022  8:40 AM Agnieszka Aceves MD [facility]        Visit Approval Visit # Therapist initials Date A NS Cx Comments    1  11-11-21 [x]  [] [] Initial evaluation       [] [] []        [] [] []        [] [] []        [] [] []        [] [] []        [] [] []        [] [] []        [] [] []        [] [] []        [] [] []        [] [] []        [] [] []        [] [] []        [] [] []        [] [] []        [] [] []        [] [] []

## 2021-11-16 ENCOUNTER — HOSPITAL ENCOUNTER (OUTPATIENT)
Dept: PHYSICAL THERAPY | Age: 61
Discharge: HOME | End: 2021-11-16
Attending: INTERNAL MEDICINE
Payer: COMMERCIAL

## 2021-11-16 PROCEDURE — 97140 MANUAL THERAPY 1/> REGIONS: CPT

## 2021-11-16 PROCEDURE — 97110 THERAPEUTIC EXERCISES: CPT

## 2021-11-16 NOTE — PROGRESS NOTES
Alli Alas  : 1960  Primary: Dejonatan Connie Of [place] King*  Secondary:  Therapy Center at [facility], [address] [facility], [place] [address] [address] [facility]  [phone]   [phone]        OUTPATIENT PHYSICAL THERAPY: Daily Treatment Note 2021  Visit Count:  2    Cervical pain    Pre-treatment Symptoms/Complaints:  Feels like it is more iritating, L side getting stiff. Slight HA on R frontal.  Pain: Initial:   8/10 Post Session:  1/10   Medications Last Reviewed:  2021  Updated Objective Findings: none today. TREATMENT:     THERAPEUTIC EXERCISE: (8 minutes):  Exercises per grid below to improve mobility, strength, balance and coordination. Required maximal verbal cues to promote proper body alignment, promote proper body posture and promote proper body mechanics. Progressed resistance, range and complexity of movement as indicated. MANUAL THERAPY: (24 minutes): Joint mobilization, Soft tissue mobilization, Manipulation and Complex decongestive physiotherapy was utilized and necessary because of the patient's restricted joint motion, painful spasm, loss of articular motion and restricted motion of soft tissue. Date:  21 Date:  21 Date:     Activity/Exercise Parameters Parameters Parameters   Post neck stretch against wall [id number], [id number](in rot)     shld depreseeion In sitting 2 x30sec     manual Stretching, paracervical, traps, SCM R>L Stretching, paracervical, traps, SCM R>L, C2 SBR, C3-4- rot R    UBE  x6minn bwd                          MedBridge Portal  Treatment/Session Summary:    Response to Treatment:  Increased motion in C2 with SBR, EDU on C2 slide with fwd flex when looking down. .   Communication/Consultation:  None today  Equipment provided today:  None today  Recommendations/Intent for next treatment session: Next visit will focus on stretching, strengthening, modalities, balance.     Total Treatment Billable Duration:  40 minutes  PT Patient Time In/Time Out  Time In: 1015  Time Out: 224 Angelica Alvarado, DPT    Future Appointments   Date Time Provider Eileen Prasadi   11/18/2021  8:45 AM Verdis Stake, [facility]   11/23/2021  9:30 AM Luther Bishop, DPT [facility] D   11/30/2021  8:45 AM Luther , DPT [facility] D   12/2/2021  8:45 AM Verdis Stake, [facility]   12/6/2021  8:30 AM [facility]   12/14/2021  8:00 AM Valeria Dalal MD [facility] PP PP   2/8/2022  8:45 [facility]   4/4/2022  9:20 AM MAT LAB [facility]   4/11/2022  8:40 AM MD KAE Delgado [facility]        Visit Approval Visit # Therapist initials Date A NS Cx Comments    1  11-11-21 [x]  [] [] Initial evaluation    2  11-16-21 [x] [] []        [] [] []        [] [] []        [] [] []        [] [] []        [] [] []        [] [] []        [] [] []        [] [] []        [] [] []        [] [] []        [] [] []        [] [] []        [] [] []        [] [] []        [] [] []        [] [] []

## 2021-11-18 ENCOUNTER — HOSPITAL ENCOUNTER (OUTPATIENT)
Dept: PHYSICAL THERAPY | Age: 61
Discharge: HOME | End: 2021-11-18
Attending: INTERNAL MEDICINE
Payer: COMMERCIAL

## 2021-11-18 PROCEDURE — 97140 MANUAL THERAPY 1/> REGIONS: CPT

## 2021-11-18 PROCEDURE — 97110 THERAPEUTIC EXERCISES: CPT

## 2021-11-18 NOTE — PROGRESS NOTES
Ursula Bridges  : 1960  Primary: [place] Lew Of [place]*  Secondary:  Therapy Center at [facility] [address], [address] [facility], [place], [facility]  [phone]   [phone]        OUTPATIENT PHYSICAL THERAPY: Daily Treatment Note 2021  Visit Count:  3    Cervical pain    Pre-treatment Symptoms/Complaints:  Patient reports it is a better day due to him changing how he chops food and splitting up the amount of time he does it. Pain: Initial:   3/10 Post Session:  2/10   Medications Last Reviewed:  2021  Updated Objective Findings: none today. TREATMENT:     THERAPEUTIC EXERCISE: (15 minutes):  Exercises per grid below to improve mobility, strength, balance and coordination. Required maximal verbal cues to promote proper body alignment, promote proper body posture and promote proper body mechanics. Progressed resistance, range and complexity of movement as indicated. MANUAL THERAPY: (24 minutes): Joint mobilization, Soft tissue mobilization, Manipulation and myofascial work to cervical paraspinals and B upper traps because of the patient's restricted joint motion, painful spasm, loss of articular motion and restricted motion of soft tissue. MODALITIES : (10 minutes)  MHP to R shoulder/ upper trap region to decrease pain and tightness, but increase mobility. Skin clear and intact.     Date:  21 Date:  21 Date:  2021   Activity/Exercise Parameters Parameters Parameters   Post neck stretch against wall [id number], [id number](in rot)  reviewed   shld depreseeion In sitting 2 x30sec     manual Stretching, paracervical, traps, SCM R>L Stretching, paracervical, traps, SCM R>L, C2 SBR, C3-4- rot R In manual    UBE  x6minn bwd Level 1  6 min backwards   Shoulder circles - backwards only    X 10 B   Shoulder shrugs   X 10 B    Shoulder blade squeezes    10 x 3 sec hold        MedBridge Portal  Treatment/Session Summary:    Response to Treatment: Decreased pain and irritability with increased ROM. He reports he took advice of PT and with chopping food is more aware of neck posture. Communication/Consultation:  None today  Equipment provided today:  None today  Recommendations/Intent for next treatment session: Next visit will focus on stretching, strengthening, modalities, balance.     Total Treatment Billable Duration:  39 minutes + 10 minutes   PT Patient Time In/Time Out  Time In: 0845  Time Out: Ling Harrison, PTA    Future Appointments   Date Time Provider Eileen Nunez   11/23/2021  9:30 AM [facility]   11/30/2021  8:45 AM Indira Mendosa DPT [facility] D   12/2/2021  8:45 AM Darinel Perkins PTA [facility]   12/6/2021  8:30 AM Jonnie Peacemaker Etha Osgood, MD [facility]   12/14/2021  8:00 AM Deepa Gu MD [facility] PP PP   2/8/2022  8:45 AM Violeta Eisenberg MD [facility]   4/4/2022  9:20 AM MAT LAB [facility]   4/11/2022  8:40 AM Juanjose Argueta MD [facility]        Visit Approval Visit # Therapist initials Date A NS Cx Comments    1  11-11-21 [x]  [] [] Initial evaluation    2  11-16-21 [x] [] []     3 [facility] 11-18-21 [x] [] []        [] [] []        [] [] []        [] [] []        [] [] []        [] [] []        [] [] []        [] [] []        [] [] []        [] [] []        [] [] []        [] [] []        [] [] []        [] [] []        [] [] []        [] [] []

## 2021-11-22 NOTE — PROGRESS NOTES
Talked to pt and informed him, per Dr. Bridgette Carr, Cspine xray revealed multilevel degenerative spondylosis and lower cervical foraminal stenosis; is he ok w/ me ordering MRI? Does he have any metal in his body? Pt has no metal in his body.

## 2021-11-23 ENCOUNTER — HOSPITAL ENCOUNTER (OUTPATIENT)
Dept: PHYSICAL THERAPY | Age: 61
Discharge: HOME | End: 2021-11-23
Attending: INTERNAL MEDICINE
Payer: COMMERCIAL

## 2021-11-23 PROCEDURE — 97140 MANUAL THERAPY 1/> REGIONS: CPT

## 2021-11-23 PROCEDURE — 97110 THERAPEUTIC EXERCISES: CPT

## 2021-11-23 NOTE — PROGRESS NOTES
Annie Kidney  : 1960  Primary: Malathi Carlos Of Donald King*  Secondary:  Therapy Center at [facility] 68, [address] [facility], [place], [facility]  [phone]   [phone]        OUTPATIENT PHYSICAL THERAPY: Daily Treatment Note 2021  Visit Count:  4    Cervical pain    Pre-treatment Symptoms/Complaints:  Patient reports it is a better day due to him changing how he chops food and splitting up the amount of time he does it. Pain: Initial:   3/10 Post Session:  1/10   Medications Last Reviewed:  2021  Updated Objective Findings: none today. TREATMENT:     THERAPEUTIC EXERCISE: (15 minutes):  Exercises per grid below to improve mobility, strength, balance and coordination. Required maximal verbal cues to promote proper body alignment, promote proper body posture and promote proper body mechanics. Progressed resistance, range and complexity of movement as indicated. MANUAL THERAPY: (24 minutes): Joint mobilization, Soft tissue mobilization, Manipulation and myofascial work to cervical paraspinals and B upper traps because of the patient's restricted joint motion, painful spasm, loss of articular motion and restricted motion of soft tissue. MODALITIES : ( minutes)  MHP to R shoulder/ upper trap region to decrease pain and tightness, but increase mobility. Skin clear and intact.     Date:  21 Date:  21 Date:  2021 Date  21   Activity/Exercise Parameters Parameters Parameters    Post neck stretch against wall [id number], [id number](in rot)  reviewed    shld depreseeion In sitting 2 x30sec      manual Stretching, paracervical, traps, SCM R>L Stretching, paracervical, traps, SCM R>L, C2 SBR, C3-4- rot R In manual  Stretching, paracervical, traps C2 SBR, C3-4- rot R   UBE  x6minn bwd Level 1  6 min backwards L3, 3/3min, fwd/bwd   Shoulder circles - backwards only    X 10 B    Shoulder shrugs   X 10 B     Shoulder blade squeezes    10 x 3 sec hold     shld rows    X12,  ylw tband   shld ext    x12  ylw tband              Med[employer] Portal  Treatment/Session Summary:    Response to Treatment:  Pt states he feels much better following session. Better PROM in C3-7 with SBR,   Communication/Consultation:  None today  Equipment provided today:  None today  Recommendations/Intent for next treatment session: Next visit will focus on stretching, strengthening, modalities, balance.     Total Treatment Billable Duration:  39 minutes + 10 minutes   PT Patient Time In/Time Out  Time In: 0930  Time Out: 1434 [facility]    Future Appointments   Date Time Provider Eileen Nunez   11/30/2021  8:45 AM [facility]   12/2/2021  8:45 AM Elizabeth Brown [facility]   12/6/2021  8:30 AM Magaly Oliver MD [facility]   12/14/2021  8:00 AM Lazaro Charles MD [facility] PP PP   2/8/2022  8:45 AM Viv Walsh MD [facility]   4/4/2022  9:20 AM MAT LAB [facility] MAT [facility]   4/11/2022  8:40 AM Eugene Lewis MD [facility] MAT [facility]        Visit Approval Visit # Therapist initials Date A NS Cx Comments    1  11-11-21 [x]  [] [] Initial evaluation    2  11-16-21 [x] [] []     3 [facility] 11-18-21 [x] [] []        [] [] []        [] [] []        [] [] []        [] [] []        [] [] []        [] [] []        [] [] []        [] [] []        [] [] []        [] [] []        [] [] []        [] [] []        [] [] []        [] [] []        [] [] []

## 2021-11-30 ENCOUNTER — HOSPITAL ENCOUNTER (OUTPATIENT)
Dept: PHYSICAL THERAPY | Age: 61
Discharge: HOME | End: 2021-11-30
Attending: INTERNAL MEDICINE
Payer: COMMERCIAL

## 2021-11-30 PROCEDURE — 97140 MANUAL THERAPY 1/> REGIONS: CPT

## 2021-11-30 PROCEDURE — 97530 THERAPEUTIC ACTIVITIES: CPT

## 2021-11-30 NOTE — PROGRESS NOTES
Jossie Waggoner  : 1960  Primary: Christine Espinal Of Crissygracia Lylyjovana*  Secondary:  Therapy Center at [facility]  [address], [address] [place] [address] Drive, [place], [facility]  [phone]   [phone]        OUTPATIENT PHYSICAL THERAPY: Daily Treatment Note 2021  Visit Count:  5    Cervical pain    Pre-treatment Symptoms/Complaints:  Patient reports he feels better, noticed while turning head to the R when someone grabs his attn and driving. Pain: Initial:   1/10 Post Session:  1/10   Medications Last Reviewed:  2021  Updated Objective Findings: none today. TREATMENT:     THERAPEUTIC EXERCISE: (15 minutes):  Exercises per grid below to improve mobility, strength, balance and coordination. Required maximal verbal cues to promote proper body alignment, promote proper body posture and promote proper body mechanics. Progressed resistance, range and complexity of movement as indicated. MANUAL THERAPY: (25 minutes): Joint mobilization, Soft tissue mobilization, Manipulation and myofascial work to cervical paraspinals and B upper traps because of the patient's restricted joint motion, painful spasm, loss of articular motion and restricted motion of soft tissue. MODALITIES : ( minutes)  MHP to R shoulder/ upper trap region to decrease pain and tightness, but increase mobility. Skin clear and intact.     Date:  21 Date:  21 Date:  2021 Date  21 Date  21   Activity/Exercise Parameters Parameters Parameters     Post neck stretch against wall [id number], [id number](in rot)  reviewed  Manual, supine   shld depreseeion In sitting 2 x30sec       manual Stretching, paracervical, traps, SCM R>L Stretching, paracervical, traps, SCM R>L, C2 SBR, C3-4- rot R In manual  Stretching, paracervical, traps C2 SBR, C3-4- rot R Stretching, paracervical, traps C2 SBR, C-7- rot R   UBE  x6minn bwd Level 1  6 min backwards L3, 3/3min, fwd/bwd    Shoulder circles - backwards only    X 10 B     Shoulder shrugs   X 10 B      Shoulder blade squeezes    10 x 3 sec hold      shld rows    X12,  ylw tband    shld ext    x12  ylw tband x12 grn band   Foam roll     Supine, punches x12, 1lb       [employer] Portal  Treatment/Session Summary:    · Response to Treatment:  Pt states he feels much better following session. Full PROM in C2-7 with manual treatment. · Communication/Consultation:  None today  · Equipment provided today:  None today  · Recommendations/Intent for next treatment session: Next visit will focus on stretching, strengthening, modalities, balance.     Total Treatment Billable Duration:  45 minutes + 0 minutes   PT Patient Time In/Time Out  Time In: 0845  Time Out: 825 Renata Lorenz DPT    Future Appointments   Date Time Provider Eileen Nunez   12/2/2021  8:45 AM Lindsey Jeffers PTA [facility]   12/6/2021  8:30 AM Arianna Beckett MD [facility] UCD   12/14/2021  8:00 AM Alexx Glynn MD [facility] PP PP   2/8/2022  8:45 AM [facility]   4/4/2022  9:20 AM MAT LAB [facility] MAT [facility]   4/11/2022  8:40 AM Christa Wayne MD [facility]        Visit Approval Visit # Therapist initials Date A NS Cx Comments    1  11-11-21 [x]  [] [] Initial evaluation    2  11-16-21 [x] [] []     3 [facility] 11-18-21 [x] [] []     4  11-30 [x] [] []        [] [] []        [] [] []        [] [] []        [] [] []        [] [] []        [] [] []        [] [] []        [] [] []        [] [] []        [] [] []        [] [] []        [] [] []        [] [] []        [] [] []

## 2021-12-02 ENCOUNTER — HOSPITAL ENCOUNTER (OUTPATIENT)
Dept: PHYSICAL THERAPY | Age: 61
Discharge: HOME | End: 2021-12-02
Attending: INTERNAL MEDICINE
Payer: COMMERCIAL

## 2021-12-02 PROCEDURE — 97140 MANUAL THERAPY 1/> REGIONS: CPT

## 2021-12-02 PROCEDURE — 97110 THERAPEUTIC EXERCISES: CPT

## 2021-12-02 NOTE — PROGRESS NOTES
Arianna Cuellar  : 1960  Primary: Jessy Antoine Of Crissygracia Lylyjovana*  Secondary:  Therapy Center at [address] [facility] [address], [address], [place], [address] [facility]  [phone]   [phone]        OUTPATIENT PHYSICAL THERAPY: Daily Treatment Note 2021  Visit Count:  6    Cervical pain    Pre-treatment Symptoms/Complaints:  Patient reports he has more pain when actually moving or using his neck. (driving, looking side to side in traffic). Pain: Initial:   1/10 Post Session:  1/10   Medications Last Reviewed:  2021  Updated Objective Findings: none today. TREATMENT:     THERAPEUTIC EXERCISE: (17 minutes):  Exercises per grid below to improve mobility, strength, balance and coordination. Required maximal verbal cues to promote proper body alignment, promote proper body posture and promote proper body mechanics. Progressed resistance, range and complexity of movement as indicated. MANUAL THERAPY: (30 minutes): Joint mobilization, Soft tissue mobilization, Manipulation and myofascial work to cervical paraspinals and B upper traps because of the patient's restricted joint motion, painful spasm, loss of articular motion and restricted motion of soft tissue. MODALITIES : ( minutes)  MHP to R shoulder/ upper trap region to decrease pain and tightness, but increase mobility. Skin clear and intact.     Date:  21 Date:  2021 Date  21 Date  21 Date:  2021   Activity/Exercise Parameters Parameters      Post neck stretch  reviewed  Manual, supine Supine manual    shld depreseeion        manual Stretching, paracervical, traps, SCM R>L, C2 SBR, C3-4- rot R In manual  Stretching, paracervical, traps C2 SBR, C3-4- rot R Stretching, paracervical, traps C2 SBR, C-7- rot R Stretching   cervical and upper trap    UBE x6minn bwd Level 1  6 min backwards L3, 3/3min, fwd/bwd  L2  4/4   Shoulder circles - backwards only   X 10 B      Shoulder shrugs  X 10 B Shoulder blade squeezes   10 x 3 sec hold       shld rows   X12,  ylw tband  Light green   X 20   Standing - press outs     Light green   X 20    shld ext   x12  ylw tband x12 grn band Light green   X 20    Foam roll    Supine, punches x12, 1lb        [employer] Portal  Treatment/Session Summary:    Response to Treatment:  Patient with improved cervical ROM and pain about the same upon departure. Patient was slightly dizzy when he sat up, but improved. No fatigue noted. Communication/Consultation:  None today  Equipment provided today:  None today  Recommendations/Intent for next treatment session: Next visit will focus on stretching, strengthening, modalities, balance.     Total Treatment Billable Duration:  47 minutes   PT Patient Time In/Time Out  Time In: 5718  Time Out: 3794 Hua Jackson [facility]    Future Appointments   Date Time Provider Eileen Nunez   12/6/2021  8:30 AM Damaris Cardenas MD [facility]   12/7/2021  8:45 AM Fernanda Shelbie, DPT [facility] D   12/9/2021  8:45 AM Chenango Shelbie, DPT [facility] D   12/14/2021  8:00 AM Jagdish Guzmán MD [facility] PP PP   12/14/2021  9:30 AM [facility]   12/16/2021  8:00 AM [facility] Gloss[facility]   12/21/2021  8:45 AM [facility] Gloss, [facility]   12/23/2021  8:45 AM Fernanda Shelbie, DPT [facility] D   12/28/2021  8:45 AM Chenango Shelbie, DPT [facility] D   12/30/2021  8:45 AM Chenango Shelbie, DPT [facility]   2/8/2022  8:45 AM Damaris Alvarado MD [facility]   4/4/2022  9:20 AM MAT LAB [facility]   4/11/2022  8:40 AM Bruce Colbert MD [facility]        Visit Approval Visit # Therapist initials Date A NS Cx Comments    1  11-11-21 [x]  [] [] Initial evaluation    2 jh 11-16-21 [x] [] []     3 PDE 11-18-21 [x] [] []     4 jh 11-30 [x] [] []     5 PDE 12-2-2021 [x] [] []        [] [] []        [] [] []        [] [] []        [] [] []        [] [] []        [] [] []        [] [] []        [] [] [] [] [] []        [] [] []        [] [] []        [] [] []        [] [] []

## 2021-12-07 ENCOUNTER — HOSPITAL ENCOUNTER (OUTPATIENT)
Dept: PHYSICAL THERAPY | Age: 61
Discharge: HOME | End: 2021-12-07
Attending: INTERNAL MEDICINE
Payer: COMMERCIAL

## 2021-12-07 PROCEDURE — 97110 THERAPEUTIC EXERCISES: CPT

## 2021-12-07 NOTE — PROGRESS NOTES
Pia Pugh  : 1960  Primary: Conner Lin Of Donald King*  Secondary:  Therapy Center at [address] [facility]  [address] [facility], [address] [place] [address] Drive, [place], [facility] W [facility]  [phone]   [phone]        OUTPATIENT PHYSICAL THERAPY: Daily Treatment Note 2021  Visit Count:  7    Cervical pain    Pre-treatment Symptoms/Complaints:  Patient reports feeling better than last time. He thinks he was sore from blowing leaves. Pain: Initial:   1/10 Post Session:  1/10   Medications Last Reviewed:  2021  Updated Objective Findings: none today. TREATMENT:     THERAPEUTIC EXERCISE: (45 minutes):  Exercises per grid below to improve mobility, strength, balance and coordination. Required maximal verbal cues to promote proper body alignment, promote proper body posture and promote proper body mechanics. Progressed resistance, range and complexity of movement as indicated. MANUAL THERAPY: (minutes): Joint mobilization, Soft tissue mobilization, Manipulation and myofascial work to cervical paraspinals and B upper traps because of the patient's restricted joint motion, painful spasm, loss of articular motion and restricted motion of soft tissue. MODALITIES : ( minutes)  MHP to R shoulder/ upper trap region to decrease pain and tightness, but increase mobility. Skin clear and intact. Date:  21 Date:  2021 Date  21 Date  21 Date:  2021 Date  21   Activity/Exercise Parameters Parameters       Post neck stretch  reviewed  Manual, supine Supine manual  Against wall, w rot and SB bilat x30sec ea.    shld depreseeion         manual Stretching, paracervical, traps, SCM R>L, C2 SBR, C3-4- rot R In manual  Stretching, paracervical, traps C2 SBR, C3-4- rot R Stretching, paracervical, traps C2 SBR, C-7- rot R Stretching   cervical and upper trap     UBE x6minn bwd Level 1  6 min backwards L3, 3/3min, fwd/bwd  L2  4/4 L2, 4/4min   Shoulder circles - backwards only   X 10 B       Shoulder shrugs  X 10 B        Shoulder blade squeezes   10 x 3 sec hold        shld rows   X12,  ylw tband  Light green   X 20 Light green   X 20   Standing - press outs     Light green   X 20  Light green   X 20   shld ext   x12  ylw tband x12 grn band Light green   X 20  Light green   X 20   rot      Light green   X 20, bilat   Foam roll    Supine, punches x12, 1lb     Functional motion      Leaf blowing motion with improved posture       [facility] Portal  Treatment/Session Summary:    Response to Treatment:  Emphasized posture during functional activities including kitchen motions. Improved rotational motion after exer to 100%. Communication/Consultation:  None today  Equipment provided today:  None today  Recommendations/Intent for next treatment session: Next visit will focus on stretching, strengthening, modalities, balance.     Total Treatment Billable Duration:  45 minutes   PT Patient Time In/Time Out  Time In: 0845  Time Out: 825 Renata Lorenz DPT    Future Appointments   Date Time Provider Eileen Nunez   12/9/2021  8:45 AM Shira Medici, DPT [facility]   12/14/2021  8:00 AM Ana Penn MD [facility] PP PP   12/14/2021  9:30 AM Christiano LEVIN [facility]   12/16/2021  8:00 AM Gen Jones [facility]   12/21/2021  8:45 AM Gen Jones [facility]   12/23/2021  8:45 AM Shira Medici, DPT [facility]   12/28/2021  8:45 AM Shira Medici, DPT [facility]   12/30/2021  8:45 AM Shira Medici, DPT [facility]   4/4/2022  9:20 AM [facility]   4/11/2022  8:40 AM Chandler Mathews MD [facility]   6/9/2022  9:15 AM Daisha Cardenas MD [facility] UCD        Visit Approval Visit # Therapist initials Date A NS Cx Comments    1  11-11-21 [x]  [] [] Initial evaluation    2  11-16-21 [x] [] []     3 PDE 11-18-21 [x] [] []     4 jh 11-30 [x] [] []     5 PDE 12-2-2021 [x] [] []     6 jh 12-7-21 [x] [] []        [] [] []        [] [] []        [] [] []        [] [] []        [] [] []        [] [] []        [] [] []        [] [] []        [] [] []        [] [] []        [] [] []        [] [] []

## 2021-12-09 ENCOUNTER — HOSPITAL ENCOUNTER (OUTPATIENT)
Dept: PHYSICAL THERAPY | Age: 61
Discharge: HOME | End: 2021-12-09
Attending: INTERNAL MEDICINE
Payer: COMMERCIAL

## 2021-12-09 PROCEDURE — 97110 THERAPEUTIC EXERCISES: CPT

## 2021-12-09 PROCEDURE — 97140 MANUAL THERAPY 1/> REGIONS: CPT

## 2021-12-09 NOTE — PROGRESS NOTES
Alec Newer  : 1960  Primary: Lindaann Lundborg Of Tiro Lylyjovana*  Secondary:  [facility], [address], [place], [facility]  [phone]   [phone]        OUTPATIENT PHYSICAL THERAPY: Daily Treatment Note 2021  Visit Count:  8    Cervical pain    Pre-treatment Symptoms/Complaints:  Patient reports  He has little catch to R when turning head quickly. Pain: Initial:   1/10 Post Session:  1/10   Medications Last Reviewed:  2021  Updated Objective Findings: none today. TREATMENT:     THERAPEUTIC EXERCISE: (38 minutes):  Exercises per grid below to improve mobility, strength, balance and coordination. Required maximal verbal cues to promote proper body alignment, promote proper body posture and promote proper body mechanics. Progressed resistance, range and complexity of movement as indicated. MANUAL THERAPY: (9minutes): Joint mobilization, Soft tissue mobilization, Manipulation and myofascial work to cervical paraspinals and B upper traps because of the patient's restricted joint motion, painful spasm, loss of articular motion and restricted motion of soft tissue. MODALITIES : ( minutes)  MHP to R shoulder/ upper trap region to decrease pain and tightness, but increase mobility. Skin clear and intact. Date  21    Date:  2021 Date  21   Activity/Exercise         Post neck stretch     Supine manual  Against wall, w rot and SB bilat x30sec ea.    shld depreseeion         manual     Stretching   cervical and upper trap     UBE L3, 4/4min    L2  4/4 L2, 4/4min   Shoulder circles - backwards only          Shoulder shrugs         Shoulder blade squeezes          shld rows Light green   X 20    Light green   X 20 Light green   X 20   Standing - press outs Light green   X 20    Light green   X 20  Light green   X 20   shld ext Light green   X 20    Light green   X 20  Light green   X 20   rot astrid tband   X 20, R>L Light green   X 20, bilat   Foam roll         Functional motion      Leaf blowing motion with improved posture   swim Against wall w emphasis on R neck rot            [employer] Portal  Treatment/Session Summary:    Response to Treatment:  Pt restricted C6/7 rot R, but improved after mobs. With progression C7 increasing mobility and beginning to be restricted more with L stretching. Main tightness is R scalene and L SCM/trap. Communication/Consultation:  None today  Equipment provided today:  None today  Recommendations/Intent for next treatment session: Next visit will focus on stretching, strengthening, modalities, balance.     Total Treatment Billable Duration:  47 minutes   PT Patient Time In/Time Out  Time In: 0845  Time Out: 825 Renata Lorenz DPT    Future Appointments   Date Time Provider Eileen Nunez   12/9/2021  8:45 AM [facility]   12/14/2021  8:00 AM Layne Curran MD [facility] PP PP   12/14/2021  9:30 AM Trixie LEVIN, [facility] SFD   12/16/2021  8:00 AM Randy Moore [facility] SFD   12/21/2021  8:45 AM Randy Moore \[facility] [place] [facility]\"" [facility] SFD   12/23/2021  8:45 AM Donnie Shyam, DPT [facility] SFD   12/28/2021  8:45 AM Donnie Shyam, DPT [facility] SFD   12/30/2021  8:45 AM Donnie Shyam, DPT [facility]   4/4/2022  9:20 AM MAT LAB [facility]   4/11/2022  8:40 AM Elier Schulz MD [facility]   6/9/2022  9:15 AM Vandana Cardenas MD [facility] UCD        Visit Approval Visit # Therapist initials Date A NS Cx Comments    1  11-11-21 [x]  [] [] Initial evaluation    2  11-16-21 [x] [] []     3 PDE 11-18-21 [x] [] []     4  11-30 [x] [] []     5 PDE 12-2-2021 [x] [] []     6  12-7-21 [x] [] []     7  12-9-21 [x] [] []        [] [] []        [] [] []        [] [] []        [] [] []        [] [] []        [] [] []        [] [] []        [] [] []        [] [] []        [] [] []        [] [] []

## 2021-12-14 ENCOUNTER — HOSPITAL ENCOUNTER (OUTPATIENT)
Dept: PHYSICAL THERAPY | Age: 61
Discharge: HOME | End: 2021-12-14
Attending: INTERNAL MEDICINE
Payer: COMMERCIAL

## 2021-12-14 PROCEDURE — 97140 MANUAL THERAPY 1/> REGIONS: CPT

## 2021-12-14 PROCEDURE — 97110 THERAPEUTIC EXERCISES: CPT

## 2021-12-14 NOTE — PROGRESS NOTES
Gil Duenas  : 1960  Primary: Xiomara Harris Of Donald King*  Secondary:  Therapy Center at [facility] 68, [address] [facility], [place], [facility] W [facility]  [phone]   [phone]        OUTPATIENT PHYSICAL THERAPY: Daily Treatment Note 2021  Visit Count:  9    Cervical pain    Pre-treatment Symptoms/Complaints:  Patient reports  He has little catch to R when turning head quickly. Pain: Initial:   1/10 Post Session:  1/10   Medications Last Reviewed:  2021  Updated Objective Findings: none today. TREATMENT:     THERAPEUTIC EXERCISE: (30 minutes):  Exercises per grid below to improve mobility, strength, balance and coordination. Required maximal verbal cues to promote proper body alignment, promote proper body posture and promote proper body mechanics. Progressed resistance, range and complexity of movement as indicated. MANUAL THERAPY: (14 minutes): Joint mobilization, Soft tissue mobilization, Manipulation and myofascial work to cervical paraspinals, R SCM, R scalene,  and B upper traps because of the patient's restricted joint motion, painful spasm, loss of articular motion and restricted motion of soft tissue. MODALITIES : ( minutes)  MHP to R shoulder/ upper trap region to decrease pain and tightness, but increase mobility. Skin clear and intact. Date  21 Date:  2021 Date  21 Date:  2021   Activity/Exercise       Post neck stretch  Supine manual  Against wall, w rot and SB bilat x30sec ea.     shld depreseeion       manual  Stretching   cervical and upper trap      UBE L3, 4/4min L2  4/4 L2, 4/4min L3  4/4 Shoulder circles - backwards only        Shoulder shrugs       Shoulder blade squeezes        shld rows Light green   X 20 Light green   X 20 Light green   X 20 Light green   X 20 B   Standing - press outs Light green   X 20 Light green   X 20  Light green   X 20 Light green   X 20 B   shld ext Light green   X 20 Light green   X 20  Light green   X 20 Light green   X 20 B   Chopping wood with rotation     Double yellow   X 10 then   Double light green   X 10   To each side    rot astrid tband   X 20, R>L  Light green   X 20, bilat    Foam roll       Functional motion   Leaf blowing motion with improved posture    swim Against wall w emphasis on R neck rot          Med[employer] Portal  Treatment/Session Summary:    Response to Treatment:  Patient improving with R sided tightness. He did report no headache today. Patient reports he is more aware of posture and body when cutting and preparing food. Communication/Consultation:  None today  Equipment provided today:  None today  Recommendations/Intent for next treatment session: Next visit will focus on stretching, strengthening, modalities, balance.     Total Treatment Billable Duration:  44 minutes   PT Patient Time In/Time Out  Time In: 7785 (Patient 6 minutes late )  Time Out: [address], [place]    Future Appointments   Date Time Provider Eileen Nunez   12/16/2021  8:00 AM Duane Rakers, PTA [facility] SFD   12/21/2021  8:45 AM Duane Rakers, [facility] SFD   12/23/2021  8:45 AM ARUN Rodriguez D   12/28/2021  8:45 AM ARUN Rodriguez D   12/30/2021  8:45 AM ARUN RodriguezDOREAL D   4/4/2022  9:20 AM MAT LAB [facility]   4/11/2022  8:40 AM Ramos Bryson MD [facility]   6/9/2022  9:15 AM Morgan Cardenas MD [facility] UCD        Visit Approval Visit # Therapist initials Date A NS Cx Comments    1  11-11-21 [x]  [] [] Initial evaluation    2  11-16-21 [x] [] []     3 [facility] 11-18-21 [x] [] []     4 jh 11-30 [x] [] []     5 PDE 12-2-2021 [x] [] []     6 jh 12-7-21 [x] [] []     7 jh 12-9-21 [x] [] []     8 PDE 12-14-21 [x] [] []        [] [] []        [] [] []        [] [] []        [] [] []        [] [] []        [] [] []        [] [] []        [] [] []        [] [] []        [] [] []

## 2021-12-16 ENCOUNTER — HOSPITAL ENCOUNTER (OUTPATIENT)
Dept: PHYSICAL THERAPY | Age: 61
Discharge: HOME | End: 2021-12-16
Attending: INTERNAL MEDICINE
Payer: COMMERCIAL

## 2021-12-16 PROCEDURE — 97140 MANUAL THERAPY 1/> REGIONS: CPT

## 2021-12-16 PROCEDURE — 97110 THERAPEUTIC EXERCISES: CPT

## 2021-12-16 NOTE — PROGRESS NOTES
Men  : 1960  Primary: Rylee Berrios Of Deeomar Lylyjovana*  Secondary:  [facility]rapy Center at [facility], [address], [place], [facility]  [phone]   [phone]        OUTPATIENT PHYSICAL THERAPY: Daily Treatment Note 2021  Visit Count:  10    Cervical pain    Pre-treatment Symptoms/Complaints:  Patient reports he slept on the sofa last night so his neck is stiff this morning. Pain: Initial:   5/10 Post Session:  1/10   Medications Last Reviewed:  2021  Updated Objective Findings: none today. TREATMENT:     THERAPEUTIC EXERCISE: (40 minutes):  Exercises per grid below to improve mobility, strength, balance and coordination. Required maximal verbal cues to promote proper body alignment, promote proper body posture and promote proper body mechanics. Progressed resistance, range and complexity of movement as indicated. MANUAL THERAPY: (13 minutes): Joint mobilization, Soft tissue mobilization, Manipulation and myofascial work to cervical paraspinals, R SCM, R scalene,  and B upper traps because of the patient's restricted joint motion, painful spasm, loss of articular motion and restricted motion of soft tissue. MODALITIES : (10 minutes)  MHP to R shoulder/ upper trap region to decrease pain and tightness, but increase mobility. Skin clear and intact. Date  21 Date:  2021 Date  21 Date:  2021 Date:  2021   Activity/Exercise        Post neck stretch  Supine manual  Against wall, w rot and SB bilat x30sec ea.      shld depreseeion        manual  Stretching   cervical and upper trap       UBE           L3, 4/4min L2  4/4 L2, 4/4min L3  4/4 Level 3  4/4                                                                                                                                                                                                   Shoulder circles - backwards only         Shoulder shrugs        Shoulder blade squeezes         shld rows Light green   X 20 Light green   X 20 Light green   X 20 Light green   X 20 B Light green   3 x 10 B   Standing - press outs Light green   X 20 Light green   X 20  Light green   X 20 Light green   X 20 B Light green   3 x 10 B   shld ext Light green   X 20 Light green   X 20  Light green   X 20 Light green   X 20 B Light green   3 x 10 B   Chopping wood with rotation     Double yellow   X 10 then   Double light green   X 10   To each side  Double light green   2 x 10   To each side    rot astrid tband   X 20, R>L  Light green   X 20, bilat     Foam roll        Functional motion   Leaf blowing motion with improved posture     swim Against wall w emphasis on R neck rot           [employer] Portal  Treatment/Session Summary:    Response to Treatment:  Patient with no headache and less stiffness at end of session today. Patient reports he continues to be aware of posture and technique with exercises. Communication/Consultation:  None today  Equipment provided today:  None today  Recommendations/Intent for next treatment session: Next visit will focus on stretching, strengthening, modalities, balance.     Total Treatment Billable Duration:  53 minutes + 10 minutes MHP   PT Patient Time In/Time Out  Time In: 1678  Time Out: ROSHNI Crowder    Future Appointments   Date Time Provider Eileen Nunez   12/21/2021  8:45 AM Rajesh John PTA [facility]   12/23/2021  8:45 AM ARUN Gaines OLLIE   12/28/2021  8:45 AM ARUN Gaines OLLIE   12/30/2021  8:45 AM Kimberly Irby DPT [facility]   4/4/2022 9:20 AM MAT LAB [facility]   4/11/2022  8:40 AM Ashanti Bassett MD [facility]   6/9/2022  9:15 AM Ximena Cardenas MD [facility]        Visit Approval Visit # Therapist initials Date A NS Cx Comments    1 jh 11-11-21 [x]  [] [] Initial evaluation    2 jh 11-16-21 [x] [] []     3 PDE 11-18-21 [x] [] []     4 jh 11-30 [x] [] []     5 PDE 12-2-2021 [x] [] []     6 jh 12-7-21 [x] [] []     7 jh 12-9-21 [x] [] []     8 PDE 12-14-21 [x] [] []     9 PDE 12-16-21 [x] [] []        [] [] []        [] [] []        [] [] []        [] [] []        [] [] []        [] [] []        [] [] []        [] [] []        [] [] []

## 2021-12-21 ENCOUNTER — HOSPITAL ENCOUNTER (OUTPATIENT)
Dept: PHYSICAL THERAPY | Age: 61
Discharge: HOME | End: 2021-12-21
Attending: INTERNAL MEDICINE
Payer: COMMERCIAL

## 2021-12-21 PROCEDURE — 97140 MANUAL THERAPY 1/> REGIONS: CPT

## 2021-12-21 PROCEDURE — 97110 THERAPEUTIC EXERCISES: CPT

## 2021-12-21 NOTE — PROGRESS NOTES
Arianna Cuellar  : 1960  Primary: Jessy Frederick Of Donald King*  Secondary:  Therapy Center at [facility] [address], [address], [place], [facility]  [phone]   [phone]        OUTPATIENT PHYSICAL THERAPY: Daily Treatment Note 2021  Visit Count:  11    Cervical pain    Pre-treatment Symptoms/Complaints:  Patient reports he is feeling good. Has not tried to do any more yard work, and has not fallen to sleep on sofa anymore. Pain: Initial:   1/10 Post Session:  1/10   Medications Last Reviewed:  2021  Updated Objective Findings: none today. TREATMENT:     THERAPEUTIC EXERCISE: (35 minutes):  Exercises per grid below to improve mobility, strength, balance and coordination. Required maximal verbal cues to promote proper body alignment, promote proper body posture and promote proper body mechanics. Progressed resistance, range and complexity of movement as indicated. MANUAL THERAPY: (12 minutes): Joint mobilization, Soft tissue mobilization, Manipulation and myofascial work to cervical paraspinals, R SCM, R scalene,  and B upper traps because of the patient's restricted joint motion, painful spasm, loss of articular motion and restricted motion of soft tissue. MODALITIES : (10 minutes)  MHP to R shoulder/ upper trap region to decrease pain and tightness, but increase mobility. Skin clear and intact. Date  21 Date:  2021 Date  21 Date:  2021 Date:  2021 Date:  2021   Activity/Exercise         Post neck stretch  Supine manual  Against wall, w rot and SB bilat x30sec ea.       shld depreseeion         manual  Stretching   cervical and upper trap        UBE           L3, 4/4min L2  4/4 L2, 4/4min L3  4/4 Level 3  4/4                                                                                                                                                                                                 Level 4  4/4                                                      Shoulder circles - backwards only          Shoulder shrugs         Shoulder blade squeezes          shld rows Light green   X 20 Light green   X 20 Light green   X 20 Light green   X 20 B Light green   3 x 10 B Light green   3 x 10 B    Standing - press outs Light green   X 20 Light green   X 20  Light green   X 20 Light green   X 20 B Light green   3 x 10 B Light green   3 x 10 B    shld ext Light green   X 20 Light green   X 20  Light green   X 20 Light green   X 20 B Light green   3 x 10 B Light green   3 x 10 B      Chopping wood with rotation     Double yellow   X 10 then   Double light green   X 10   To each side  Double light green   2 x 10   To each side  Double yellow   3 x 10 to each side    Upward cut with rotation       Double yellow   2 x `10 to each side   rot astrid tband   X 20, R>L  Light green   X 20, bilat      Foam roll         Functional motion   Leaf blowing motion with improved posture      swim Against wall w emphasis on R neck rot            [employer] Portal  Treatment/Session Summary:    Response to Treatment:  Patient improving with mobility, pain, tightness, and strength. Patient given red thera-band for HEP. Patient has much more body awareness and improved posture. Communication/Consultation:  None today  Equipment provided today:  None today  Recommendations/Intent for next treatment session: Next visit will focus on stretching, strengthening, modalities, balance.     Total Treatment Billable Duration:  47 minutes + 10 minutes MHP   PT Patient Time In/Time Out  Time In: 4752  Time Out: [facility] Blmeli Arreguin PTA    Future Appointments   Date Time Provider Eileen Mayra   12/23/2021  8:45 AM Andres Pia, DPT [facility]   12/28/2021  8:45 AM Andres Shaylak, DPT [facility]   12/30/2021  8:45 AM Andres Shaylak, DPT [facility]   4/4/2022  9:20 AM MAT LAB [facility]   4/11/2022  8:40 AM MD KAE mAbroc[facility]   6/9/2022  9:15 AM Arianna Cardenas MD [facility]        Visit Approval Visit # Therapist initials Date A NS Cx Comments    1  11-11-21 [x]  [] [] Initial evaluation    2  11-16-21 [x] [] []     3 [facility] 11-18-21 [x] [] []     4  11-30 [x] [] []     5 PDE 12-2-2021 [x] [] []     6  12-7-21 [x] [] []     7  12-9-21 [x] [] []     8 PDE 12-14-21 [x] [] []     9 PDE 12-16-21 [x] [] []     10 PDE 12-21-21 [x] [] []        [] [] []        [] [] []        [] [] []        [] [] []        [] [] []        [] [] []        [] [] []        [] [] []

## 2021-12-23 ENCOUNTER — HOSPITAL ENCOUNTER (OUTPATIENT)
Dept: PHYSICAL THERAPY | Age: 61
Discharge: HOME | End: 2021-12-23
Attending: INTERNAL MEDICINE
Payer: COMMERCIAL

## 2021-12-23 PROCEDURE — 97110 THERAPEUTIC EXERCISES: CPT

## 2021-12-23 NOTE — THERAPY RECERTIFICATION
Amaradana Given  : 1960  Primary: Fany Rosales Of Donald King*  Secondary:  Therapy Center at [facility] 68, [address], [place], [facility]  [phone]   [phone]        OUTPATIENT PHYSICAL THERAPY: Daily Treatment Note 2021  Visit Count:  12    Cervical pain    Pre-treatment Symptoms/Complaints:  Patient reports turning head to drive, sleeping is better, doing more with R side with driving, and cooking, only limitation is breathing difficulty. Pain: Initial:   1/10 Post Session:  1/10   Medications Last Reviewed:  2021  Updated Objective Findings: none today. TREATMENT:     THERAPEUTIC EXERCISE: (45 minutes):  Exercises per grid below to improve mobility, strength, balance and coordination. Required maximal verbal cues to promote proper body alignment, promote proper body posture and promote proper body mechanics. Progressed resistance, range and complexity of movement as indicated. MANUAL THERAPY: ( minutes): Joint mobilization, Soft tissue mobilization, Manipulation and myofascial work to cervical paraspinals, R SCM, R scalene,  and B upper traps because of the patient's restricted joint motion, painful spasm, loss of articular motion and restricted motion of soft tissue. MODALITIES : (0 minutes)  MHP to R shoulder/ upper trap region to decrease pain and tightness, but increase mobility. Skin clear and intact.     Date  21    Date:  2021 Date:  2021   Activity/Exercise         Post neck stretch agasint wall with rot [id number]        shld depreseeion         manual C7 rot R        UBE           Nu step, UE only     Level 3  4/4                                                                                                                                                                                                 Level 4  4/4                                                      Shoulder circles - backwards only Shoulder shrugs         Shoulder blade squeezes          shld rows Kuldeep, 2x15    Light green   3 x 10 B Light green   3 x 10 B    Standing - press outs     Light green   3 x 10 B Light green   3 x 10 B    shld ext kuldeep 2x15    Light green   3 x 10 B Light green   3 x 10 B      Chopping wood with rotation      Double light green   2 x 10   To each side  Double yellow   3 x 10 to each side    Upward cut with rotation       Double yellow   2 x `10 to each side   rot kuldeep single 2x15        Foam roll         Functional motion         swim           Short-Term Functional Goals: Time Frame: 6 weeks  Pt will be independent with range of motion and strength home exercise program.  GOAL MET 12-23-21  Pt will reduce score on NDI to 28/50 in order to demonstrate improved functional mobility and quality of life. GOAL MET 12-23-21  Patient to demonstrate increase cervical AROM to 75% in all directions  GOAL MET 12-23-21     DISCHARGE GOALS: Time Frame: 12 weeks                                                                                        1. Pt will reduce score on NDI to 20/50 in order to demonstrate improved functional mobility and quality of life. GOAL MET 12-23-21  2. Patient to demonstrate no symptoms during ADLs in over 1 week   GOAL MET 12-23-21  3. Patient to demonstrate increase cervical AROM to 100% in all directions  GOAL MET 12-23-21     OUTCOME MEASURE:   Tool Used: Modified Oswestry Low Back Pain Questionnaire  Score:  Initial: 37/50  Most Recent: 14/50 (Date: 12-23-21 )   Interpretation of Score: Each section is scored on a 0-5 scale, 5 representing the greatest disability.   The scores of each section are added together for a total score of 50.       Balance:          MCTSIB=3/12, C1=3; C2 fail in 2sec, C3-4=UA  Range of Motion Tight end ranges Tight end ranges           Joint: Date: 11-11-21   Date: 12-23-21   Date:       Right Left Right Left Right Left   Side bending  33 33  75 75       Rotation  33 33  75 75       Fwb bend 33    75         Bwd bend  25    75         PROM limitations Restricted C3-7    WNL                                               [employer] Portal  Treatment/Session Summary:    Response to Treatment:  Patient improving with mobility, pain, tightness, and strength. Pt improved and will adjust to 1 more visit to assess avoidance activities. Anticipate dc next visit. Communication/Consultation:  None today  Equipment provided today:  None today  Recommendations/Intent for next treatment session: Next visit will focus on stretching, strengthening, modalities, balance.     Total Treatment Billable Duration:  47 minutes    PT Patient Time In/Time Out  Time In: 0845  Time Out: 825 Renata Lorenz DPT    Future Appointments   Date Time Provider Eileen Prasadi   12/23/2021  8:45 AM Nicole Leo DPT [facility]   12/28/2021  8:45 AM Nicole Leo DPT [facility]   12/30/2021  8:45 AM Nicole Leo DPT [facility]   4/4/2022  9:20 AM MAT LAB [facility]   4/11/2022  8:40 AM Carolina Wray MD [facility]   6/9/2022  9:15 AM Oni Cardenas MD [facility]        Visit Approval Visit # Therapist initials Date A NS Cx Comments    1  11-11-21 [x]  [] [] Initial evaluation    2  11-16-21 [x] [] []     3 PDE 11-18-21 [x] [] []     4  11-30 [x] [] []     5 PDE 12-2-2021 [x] [] []     6  12-7-21 [x] [] []     7  12-9-21 [x] [] []     8 PDE 12-14-21 [x] [] []     9 PDE 12-16-21 [x] [] []     10 PDE 12-21-21 [x] [] []     11 [facility] 12-23-21 [x] [] [] reeval       [] [] []        [] [] []        [] [] []        [] [] []        [] [] []        [] [] []        [] [] []

## 2021-12-28 ENCOUNTER — APPOINTMENT (OUTPATIENT)
Dept: PHYSICAL THERAPY | Age: 61
End: 2021-12-28
Attending: INTERNAL MEDICINE
Payer: COMMERCIAL

## 2021-12-30 ENCOUNTER — HOSPITAL ENCOUNTER (OUTPATIENT)
Dept: PHYSICAL THERAPY | Age: 61
Discharge: HOME | End: 2021-12-30
Attending: INTERNAL MEDICINE
Payer: COMMERCIAL

## 2021-12-30 PROCEDURE — 97110 THERAPEUTIC EXERCISES: CPT

## 2021-12-30 NOTE — THERAPY DISCHARGE
Trevon Reece  : 1960  Primary: Brian Murrell Of Donald King*  Secondary:  Therapy Center at [facility] [address], [address], [place], [facility]  [phone]   [phone]        OUTPATIENT PHYSICAL THERAPY: Discharge Note 2021  Visit Count:  13    Cervical pain    Pre-treatment Symptoms/Complaints:  Patient reports wife has noticed posture is better. Doing lots of chopping and household chores and feeling better doing more tasks. Pain: Initial:   0/10 Post Session:  1/10   Medications Last Reviewed:  2021  Updated Objective Findings: none today. TREATMENT:     THERAPEUTIC EXERCISE: (40 minutes):  Exercises per grid below to improve mobility, strength, balance and coordination. Required maximal verbal cues to promote proper body alignment, promote proper body posture and promote proper body mechanics. Progressed resistance, range and complexity of movement as indicated. MANUAL THERAPY: ( minutes): Joint mobilization, Soft tissue mobilization, Manipulation and myofascial work to cervical paraspinals, R SCM, R scalene,  and B upper traps because of the patient's restricted joint motion, painful spasm, loss of articular motion and restricted motion of soft tissue. MODALITIES : (0 minutes)  MHP to R shoulder/ upper trap region to decrease pain and tightness, but increase mobility. Skin clear and intact.     Date  21 Date  21   Date:  2021 Date:  2021   Activity/Exercise         Post neck stretch agasint wall with rot [id number]        shld depreseeion         manual C7 rot R        UBE           Nu step, UE only 8min, 4/4    Level 3  4/4                                                                                                                                                                                                 Level 4  4/4                                                      Shoulder circles - backwards only Shoulder shrugs         Shoulder blade squeezes          shld rows Astrid, 2x15 astrid 3x15   Light green   3 x 10 B Light green   3 x 10 B    Standing - press outs     Light green   3 x 10 B Light green   3 x 10 B    shld ext astrid 2x15 astrid 3x15   Light green   3 x 10 B Light green   3 x 10 B      Chopping wood with rotation      Double light green   2 x 10   To each side  Double yellow   3 x 10 to each side    Upward cut with rotation       Double yellow   2 x `10 to each side   rot astrid single 2x15 astrid dbl 3x15       Foam roll         Functional motion         swim             [employer] Portal  Treatment/Session Summary:    Response to Treatment:  Patient improving with mobility, performed all tband exer in succession x3 and had not effects. Will dc pt due to all goals met. Discussed HEP continuation. Pt comfortable with plan of dc. Communication/Consultation:  None today  Equipment provided today:  None today  Recommendations/Intent for next treatment session: dc.     Total Treatment Billable Duration:  40 minutes    PT Patient Time In/Time Out  Time In: 0845  Time Out: 825 Renata Lorenz DPT    Future Appointments   Date Time Provider Eileen Nunez   12/30/2021  8:45 AM Tera Almeida DPT [facility]   4/4/2022  9:20 AM MAT LAB [facility]   4/11/2022  8:40 AM Neetu Moss MD [facility]   6/9/2022  9:15 AM Kaity Cardenas MD [facility] U[facility]        Visit Approval Visit # Therapist initials Date A NS Cx Comments    1  11-11-21 [x]  [] [] Initial evaluation    2  11-16-21 [x] [] []     3 PDE 11-18-21 [x] [] []     4  11-30 [x] [] []     5 PDE 12-2-2021 [x] [] []     6  12-7-21 [x] [] []     7  12-9-21 [x] [] []     8 PDE 12-14-21 [x] [] []     9 PDE 12-16-21 [x] [] []     10 PDE 12-21-21 [x] [] []     11 [facility] 12-23-21 [x] [] [] reeval    12  12-30-21 X [] [] dc       [] [] []        [] [] []        [] [] []        [] [] []        [] [] []        [] [] []

## 2022-02-24 ENCOUNTER — APPOINTMENT (OUTPATIENT)
Dept: GENERAL RADIOLOGY | Age: 62
End: 2022-02-24
Attending: EMERGENCY MEDICINE

## 2022-02-24 ENCOUNTER — HOSPITAL ENCOUNTER (EMERGENCY)
Age: 62
Discharge: HOME | End: 2022-02-24
Attending: EMERGENCY MEDICINE

## 2022-02-24 VITALS
TEMPERATURE: 98.3 F | DIASTOLIC BLOOD PRESSURE: 94 MMHG | HEIGHT: 73 IN | WEIGHT: 226 LBS | BODY MASS INDEX: 29.95 KG/M2 | SYSTOLIC BLOOD PRESSURE: 151 MMHG | HEART RATE: 64 BPM | OXYGEN SATURATION: 96 % | RESPIRATION RATE: 18 BRPM

## 2022-02-24 DIAGNOSIS — I10 HYPERTENSION, UNSPECIFIED TYPE: Primary | ICD-10-CM

## 2022-02-24 LAB
ALBUMIN SERPL-MCNC: 3.3 G/DL (ref 3.2–4.6)
ALBUMIN/GLOB SERPL: 0.7 {RATIO} (ref 1.2–3.5)
ALP SERPL-CCNC: 64 U/L (ref 50–136)
ALT SERPL-CCNC: 48 U/L (ref 12–65)
ANION GAP SERPL CALC-SCNC: 3 MMOL/L (ref 7–16)
AST SERPL-CCNC: 31 U/L (ref 15–37)
ATRIAL RATE: 74 BPM
BASOPHILS # BLD: 0 K/UL (ref 0–0.2)
BASOPHILS NFR BLD: 1 % (ref 0–2)
BILIRUB SERPL-MCNC: 0.4 MG/DL (ref 0.2–1.1)
BNP SERPL-MCNC: 27 PG/ML (ref 5–125)
BUN SERPL-MCNC: 16 MG/DL (ref 8–23)
CALCIUM SERPL-MCNC: 9 MG/DL (ref 8.3–10.4)
CALCULATED P AXIS, ECG09: 66 DEGREES
CALCULATED R AXIS, ECG10: 78 DEGREES
CALCULATED T AXIS, ECG11: 43 DEGREES
CHLORIDE SERPL-SCNC: 109 MMOL/L (ref 98–107)
CO2 SERPL-SCNC: 27 MMOL/L (ref 21–32)
CREAT SERPL-MCNC: 1.1 MG/DL (ref 0.8–1.5)
DIAGNOSIS, 93000: NORMAL
DIFFERENTIAL METHOD BLD: ABNORMAL
EOSINOPHIL # BLD: 0.2 K/UL (ref 0–0.8)
EOSINOPHIL NFR BLD: 4 % (ref 0.5–7.8)
ERYTHROCYTE [DISTWIDTH] IN BLOOD BY AUTOMATED COUNT: 15 % (ref 11.9–14.6)
GLOBULIN SER CALC-MCNC: 4.8 G/DL (ref 2.3–3.5)
GLUCOSE SERPL-MCNC: 102 MG/DL (ref 65–100)
HCT VFR BLD AUTO: 42.2 % (ref 41.1–50.3)
HGB BLD-MCNC: 13.7 G/DL (ref 13.6–17.2)
IMM GRANULOCYTES # BLD AUTO: 0 K/UL (ref 0–0.5)
IMM GRANULOCYTES NFR BLD AUTO: 0 % (ref 0–5)
LYMPHOCYTES # BLD: 2.1 K/UL (ref 0.5–4.6)
LYMPHOCYTES NFR BLD: 42 % (ref 13–44)
MAGNESIUM SERPL-MCNC: 2.3 MG/DL (ref 1.8–2.4)
MCH RBC QN AUTO: 29.4 PG (ref 26.1–32.9)
MCHC RBC AUTO-ENTMCNC: 32.5 G/DL (ref 31.4–35)
MCV RBC AUTO: 90.6 FL (ref 79.6–97.8)
MONOCYTES # BLD: 0.5 K/UL (ref 0.1–1.3)
MONOCYTES NFR BLD: 9 % (ref 4–12)
NEUTS SEG # BLD: 2.2 K/UL (ref 1.7–8.2)
NEUTS SEG NFR BLD: 44 % (ref 43–78)
NRBC # BLD: 0 K/UL (ref 0–0.2)
P-R INTERVAL, ECG05: 172 MS
PLATELET # BLD AUTO: 242 K/UL (ref 150–450)
PMV BLD AUTO: 9.5 FL (ref 9.4–12.3)
POTASSIUM SERPL-SCNC: 4 MMOL/L (ref 3.5–5.1)
PROT SERPL-MCNC: 8.1 G/DL (ref 6.3–8.2)
Q-T INTERVAL, ECG07: 408 MS
QRS DURATION, ECG06: 100 MS
QTC CALCULATION (BEZET), ECG08: 452 MS
RBC # BLD AUTO: 4.66 M/UL (ref 4.23–5.6)
SODIUM SERPL-SCNC: 139 MMOL/L (ref 138–145)
TROPONIN-HIGH SENSITIVITY: 13.1 PG/ML (ref 0–14)
TROPONIN-HIGH SENSITIVITY: 14.3 PG/ML (ref 0–14)
VENTRICULAR RATE, ECG03: 74 BPM
WBC # BLD AUTO: 5 K/UL (ref 4.3–11.1)

## 2022-02-24 PROCEDURE — 74011000250 HC RX REV CODE- 250: Performed by: EMERGENCY MEDICINE

## 2022-02-24 PROCEDURE — 84484 ASSAY OF TROPONIN QUANT: CPT

## 2022-02-24 PROCEDURE — 85025 COMPLETE CBC W/AUTO DIFF WBC: CPT

## 2022-02-24 PROCEDURE — 71045 X-RAY EXAM CHEST 1 VIEW: CPT

## 2022-02-24 PROCEDURE — 96374 THER/PROPH/DIAG INJ IV PUSH: CPT

## 2022-02-24 PROCEDURE — 99285 EMERGENCY DEPT VISIT HI MDM: CPT

## 2022-02-24 PROCEDURE — 83735 ASSAY OF MAGNESIUM: CPT

## 2022-02-24 PROCEDURE — 93005 ELECTROCARDIOGRAM TRACING: CPT | Performed by: EMERGENCY MEDICINE

## 2022-02-24 PROCEDURE — 94762 N-INVAS EAR/PLS OXIMTRY CONT: CPT

## 2022-02-24 PROCEDURE — 83880 ASSAY OF NATRIURETIC PEPTIDE: CPT

## 2022-02-24 PROCEDURE — 80053 COMPREHEN METABOLIC PANEL: CPT

## 2022-02-24 RX ORDER — SODIUM CHLORIDE 0.9 % (FLUSH) 0.9 %
5-10 SYRINGE (ML) INJECTION EVERY 8 HOURS
Status: DISCONTINUED | OUTPATIENT
Start: 2022-02-24 | End: 2022-02-24 | Stop reason: HOSPADM

## 2022-02-24 RX ORDER — SODIUM CHLORIDE 0.9 % (FLUSH) 0.9 %
5-10 SYRINGE (ML) INJECTION AS NEEDED
Status: DISCONTINUED | OUTPATIENT
Start: 2022-02-24 | End: 2022-02-24 | Stop reason: HOSPADM

## 2022-02-24 RX ORDER — LABETALOL HYDROCHLORIDE 5 MG/ML
20 INJECTION, SOLUTION INTRAVENOUS
Status: COMPLETED | OUTPATIENT
Start: 2022-02-24 | End: 2022-02-24

## 2022-02-24 RX ADMIN — LABETALOL HYDROCHLORIDE 20 MG: 5 INJECTION INTRAVENOUS at 11:00

## 2022-02-24 NOTE — ED NOTES
I have reviewed discharge instructions with the patient. The patient verbalized understanding. Patient left ED via Discharge Method: ambulatory to Home with self. Opportunity for questions and clarification provided. Patient given 0 scripts. To continue your aftercare when you leave the hospital, you may receive an automated call from our care team to check in on how you are doing. This is a free service and part of our promise to provide the best care and service to meet your aftercare needs.  If you have questions, or wish to unsubscribe from this service please call [phone]. Thank you for Choosing our [facility].

## 2022-02-24 NOTE — DISCHARGE INSTRUCTIONS
Continue all your current medications  Obtain a new blood pressure cuff that we will measure on your bicep.   Call your medical doctor to schedule close follow-up appointment    Return to ER for any worsening symptoms or new problems which may arise Ear Star Wedge Flap Text: The defect edges were debeveled with a #15 blade scalpel.  Given the location of the defect and the proximity to free margins (helical rim) an ear star wedge flap was deemed most appropriate.  Using a sterile surgical marker, the appropriate flap was drawn incorporating the defect and placing the expected incisions between the helical rim and antihelix where possible.  The area thus outlined was incised through and through with a #15 scalpel blade.

## 2022-02-24 NOTE — ED PROVIDER NOTES
59-year-old male patient presents to the ER with concerns over elevated blood pressure readings some mild nausea and feeling woozy this morning. Patient states that he awoke took all his normal medications right at 7 AM as per his routine. He took his wife to work without any issues. Later in the morning he said he just felt a little off and took his blood pressure with a wrist cuff and noted to be elevated    Patient is followed by Dr. Doreen Jo at [facility] cardiology for history of congestive heart failure. States he is compliant with all his medications including his antihypertensives. Denies any recent illnesses. Currently states that he feels well. He has no shortness of breath or chest pain    The history is provided by the patient. Shortness of Breath  This is a new problem. The problem occurs rarely. The current episode started 1 to 2 hours ago. The problem has been gradually improving. Pertinent negatives include no fever, no headaches, no rhinorrhea, no sore throat, no neck pain, no cough, no sputum production, no hemoptysis, no wheezing, no chest pain, no syncope, no vomiting, no abdominal pain, no rash and no leg swelling. It is unknown what precipitated the problem. He has tried nothing for the symptoms. He has had prior hospitalizations. He has had prior ED visits. Associated medical issues include heart failure. Past Medical History:   Diagnosis Date    Arthritis     CHF (congestive heart failure) ([address] 7/20/2014    with acute/chronic systolic CHF in face of malignant hypertension and polysubstance abuse to include alcohol, cocaine    CHF (congestive heart failure) ([address] 2/20/15    echo: LVEF 50-55%, mild to mod . MR    Chronic kidney disease     stage III per cardiology ov note 2/25/15 (cr. 1.5)    COVID-19 virus infection 1/27/2021    Diverticulosis of large intestine without diverticulitis 2016    History of alcohol abuse 7/22/2014    History of drug abuse ([address] 7/22/14    History of echocardiogram echo: 2/20/15    LVEF 50-55%, mild to mod. MR, mild to moderate concentric LV hypertrophy.  Hx of colonic polyps 2016    Hypertension     IFG (impaired fasting glucose) 12/23/2019    Persistent disorder of initiating or maintaining sleep 1/28/2019    Reactive depression 10/12/2021    Seizures ([facility]     Sleep apnea        Past Surgical History:   Procedure Laterality Date    HX COLONOSCOPY  last 2/8/16    Reina--five small sigmoid polyps--5-7 year recall         Family History:   Problem Relation Age of Onset    Alcohol abuse Mother     Heart Attack Other     Heart Disease Brother 54       Social History     Socioeconomic History    Marital status:      Spouse name: Not on file    Number of children: 3    Years of education: Not on file    Highest education level: Not on file   Occupational History    Occupation: [employer]   Tobacco Use    Smoking status: Never Smoker    Smokeless tobacco: Never Used   Substance and Sexual Activity    Alcohol use: Not Currently    Drug use: Yes     Types: Cocaine     Comment: not used cocaine since July 2014    Sexual activity: Not on file   Other Topics Concern     Service Not Asked    Blood Transfusions Not Asked    Caffeine Concern Not Asked    Occupational Exposure Not Asked    Hobby Hazards Not Asked    Sleep Concern Not Asked    Stress Concern Not Asked    Weight Concern Not Asked    Special Diet Not Asked    Back Care Not Asked    Exercise Not Asked    Bike Helmet Not Asked    Seat Belt Not Asked    Self-Exams Not Asked   Social History Narrative    Not on file     Social Determinants of Health     Financial Resource Strain:     Difficulty of Paying Living Expenses: Not on file   Food Insecurity:     Worried About Running Out of Food in the Last Year: Not on file    Noel of Food in the Last Year: Not on file   Transportation Needs:     Lack of Transportation (Medical):  Not on file    Lack of Transportation (Non-Medical): Not on file   Physical Activity:     Days of Exercise per Week: Not on file    Minutes of Exercise per Session: Not on file   Stress:     Feeling of Stress : Not on file   Social Connections:     Frequency of Communication with Friends and Family: Not on file    Frequency of Social Gatherings with Friends and Family: Not on file    Attends Zoroastrianism Services: Not on file    Active Member of [address] [place] [address] or Organizations: Not on file    Attends Club or Organization Meetings: Not on file    Marital Status: Not on file   Intimate Partner Violence:     Fear of Current or Ex-Partner: Not on file    Emotionally Abused: Not on file    Physically Abused: Not on file    Sexually Abused: Not on file   Housing Stability:     Unable to Pay for Housing in the Last Year: Not on file    Number of [place] in the Last Year: Not on file    Unstable Housing in the Last Year: Not on file         ALLERGIES: Sunflower seed and Pine nut    Review of Systems   Constitutional: Positive for fatigue. Negative for activity change, chills, diaphoresis and fever. HENT: Negative for dental problem, hearing loss, nosebleeds, rhinorrhea and sore throat. Eyes: Negative for pain, discharge, redness and visual disturbance. Respiratory: Positive for shortness of breath. Negative for cough, hemoptysis, sputum production, chest tightness and wheezing. Cardiovascular: Negative for chest pain, palpitations, leg swelling and syncope. Gastrointestinal: Negative for abdominal pain, constipation, diarrhea, nausea and vomiting. Endocrine: Negative for cold intolerance, heat intolerance, polydipsia and polyuria. Genitourinary: Negative for dysuria and flank pain. Musculoskeletal: Positive for arthralgias. Negative for back pain, joint swelling, myalgias and neck pain. Skin: Negative for pallor and rash. Allergic/Immunologic: Negative for environmental allergies and food allergies.    Neurological: Negative for dizziness, tremors, light-headedness, numbness and headaches. Hematological: Negative for adenopathy. Does not bruise/bleed easily. Psychiatric/Behavioral: Negative for confusion and dysphoric mood. The patient is not nervous/anxious and is not hyperactive. All other systems reviewed and are negative. Vitals:    02/24/22 0810   BP: (!) 148/100   Pulse: 75   Resp: 16   Temp: 98.3 °F (36.8 °C)   SpO2: 98%   Weight: 102.5 kg (226 lb)   Height: 6' 1\" (1.854 m)            Physical Exam  Vitals and nursing note reviewed. Constitutional:       General: He is in acute distress. Appearance: He is well-developed and normal weight. HENT:      Head: Normocephalic and atraumatic. Right Ear: External ear normal.      Left Ear: External ear normal.      Mouth/Throat:      Mouth: Mucous membranes are moist.      Pharynx: Oropharynx is clear. No oropharyngeal exudate. Eyes:      General: No scleral icterus. Extraocular Movements: Extraocular movements intact. Conjunctiva/sclera: Conjunctivae normal.      Pupils: Pupils are equal, round, and reactive to light. Neck:      Thyroid: No thyromegaly. Vascular: No JVD. Cardiovascular:      Rate and Rhythm: Normal rate and regular rhythm. Heart sounds: Normal heart sounds. No murmur heard. No friction rub. No gallop. Pulmonary:      Effort: Pulmonary effort is normal. No respiratory distress. Breath sounds: Normal breath sounds. No decreased breath sounds, wheezing or rhonchi. Abdominal:      General: Bowel sounds are normal. There is no distension. Palpations: Abdomen is soft. Tenderness: There is no abdominal tenderness. Musculoskeletal:         General: No tenderness or deformity. Normal range of motion. Cervical back: Normal range of motion and neck supple. Skin:     General: Skin is warm and dry. Capillary Refill: Capillary refill takes less than 2 seconds. Findings: No rash. Neurological:      General: No focal deficit present. Mental Status: He is alert and oriented to person, place, and time. Cranial Nerves: No cranial nerve deficit. Sensory: No sensory deficit. Motor: No abnormal muscle tone. Coordination: Coordination normal.   Psychiatric:         Mood and Affect: Mood normal.         Behavior: Behavior normal.         Thought Content: Thought content normal.         Judgment: Judgment normal.          MDM  Number of Diagnoses or Management Options  Hypertension, unspecified type: established and worsening  Diagnosis management comments: 52-year-old male patient presents to the ER with concerns of elevated blood pressure. States he felt mildly short of breath and \"woozy\" this morning  When he checked his pressure he got high readings on his wrist cuff. Presents for further evaluation. Initial EKG is unremarkable  Patient's blood pressure is trending high in the 140s over 100 range  Patient states he took his usual blood pressure medicines around 7 AM  We will continue to monitor. If he does not start to trend more toward normal, will treat       Amount and/or Complexity of Data Reviewed  Clinical lab tests: ordered and reviewed  Tests in the radiology section of CPT®: ordered and reviewed  Tests in the medicine section of CPT®: ordered and reviewed  Decide to obtain previous medical records or to obtain history from someone other than the patient: yes  Review and summarize past medical records: yes  Independent visualization of images, tracings, or specimens: yes    Risk of Complications, Morbidity, and/or Mortality  Presenting problems: moderate  Diagnostic procedures: moderate  Management options: moderate  General comments: Elements of this note have been dictated via voice recognition software. Text and phrases may be limited by the accuracy of the software. The chart has been reviewed, but errors may still be present.       Patient Progress  Patient progress: improved         EKG    Date/Time: 2/24/2022 8:18 AM  Performed by: Michaela Yip MD  Authorized by: Michaela Yip MD     ECG reviewed by ED Physician in the absence of a cardiologist: yes    Previous ECG:     Previous ECG:  Compared to current    Similarity:  No change  Interpretation:     Interpretation: non-specific    Rate:     ECG rate assessment: normal    Rhythm:     Rhythm: sinus rhythm    Ectopy:     Ectopy: PAC    QRS:     QRS axis:  Normal    QRS intervals:  Normal  ST segments:     ST segments:  Normal  T waves:     T waves: normal    Comments:      No acute ischemic changes  No significant interval change from prior tracing

## 2022-02-24 NOTE — ED TRIAGE NOTES
Pt arrives ambulatory to triage. Pt states when he woke up he felt drowsy. States used a wrist BP cuff and states it said his BP was 220/110. Pt states he is compliant with his BP medications. Pt states then when he saw his BP reading he started to feel short of breath. Pt was recently hospitalized with covid for 6 weeks. /100 in triage, pt took medication when he woke up this AM. Denies numbness, denies unilateral weakness, denies changes to speech, denies changes to vision. NAD. Masked.

## 2022-03-18 PROBLEM — F41.9 ANXIETY: Status: ACTIVE | Noted: 2021-05-17

## 2022-03-18 PROBLEM — G47.00 PERSISTENT DISORDER OF INITIATING OR MAINTAINING SLEEP: Status: ACTIVE | Noted: 2019-01-28

## 2022-03-18 PROBLEM — R56.9 SEIZURE (HCC): Status: ACTIVE | Noted: 2021-03-09

## 2022-03-18 PROBLEM — I42.0 DILATED CARDIOMYOPATHY (HCC): Status: ACTIVE | Noted: 2021-06-01

## 2022-03-19 PROBLEM — F32.9 REACTIVE DEPRESSION: Status: ACTIVE | Noted: 2021-10-12

## 2022-03-19 PROBLEM — U07.1 COVID-19 VIRUS INFECTION: Status: ACTIVE | Noted: 2021-01-27

## 2022-03-19 PROBLEM — G47.33 OSA (OBSTRUCTIVE SLEEP APNEA): Status: ACTIVE | Noted: 2019-06-17

## 2022-03-19 PROBLEM — E55.9 VITAMIN D DEFICIENCY: Status: ACTIVE | Noted: 2021-05-17

## 2022-03-19 PROBLEM — R94.39 ABNORMAL NUCLEAR STRESS TEST: Status: ACTIVE | Noted: 2021-08-06

## 2022-03-19 PROBLEM — K63.5 HYPERPLASTIC COLONIC POLYP: Status: ACTIVE | Noted: 2017-10-09

## 2022-03-19 PROBLEM — M50.30 DDD (DEGENERATIVE DISC DISEASE), CERVICAL: Status: ACTIVE | Noted: 2021-11-09

## 2022-03-19 PROBLEM — E11.9 TYPE 2 DIABETES MELLITUS WITHOUT COMPLICATION, WITHOUT LONG-TERM CURRENT USE OF INSULIN (HCC): Status: ACTIVE | Noted: 2021-05-17

## 2022-03-19 PROBLEM — I25.2 HISTORY OF MI (MYOCARDIAL INFARCTION): Status: ACTIVE | Noted: 2021-06-01

## 2022-03-19 PROBLEM — M1A.0710 IDIOPATHIC CHRONIC GOUT OF RIGHT ANKLE: Status: ACTIVE | Noted: 2018-04-16

## 2022-04-04 ENCOUNTER — APPOINTMENT (OUTPATIENT)
Dept: GENERAL RADIOLOGY | Age: 62
End: 2022-04-04
Attending: EMERGENCY MEDICINE

## 2022-04-04 LAB
ALBUMIN SERPL-MCNC: 3.2 G/DL (ref 3.2–4.6)
ALBUMIN/GLOB SERPL: 0.7 {RATIO} (ref 1.2–3.5)
ALP SERPL-CCNC: 63 U/L (ref 50–136)
ALT SERPL-CCNC: 54 U/L (ref 12–65)
ANION GAP SERPL CALC-SCNC: 7 MMOL/L (ref 7–16)
AST SERPL-CCNC: 52 U/L (ref 15–37)
BASOPHILS # BLD: 0 K/UL (ref 0–0.2)
BASOPHILS NFR BLD: 1 % (ref 0–2)
BILIRUB SERPL-MCNC: 0.4 MG/DL (ref 0.2–1.1)
BNP SERPL-MCNC: 121 PG/ML (ref 5–125)
BUN SERPL-MCNC: 15 MG/DL (ref 8–23)
CALCIUM SERPL-MCNC: 8.9 MG/DL (ref 8.3–10.4)
CHLORIDE SERPL-SCNC: 105 MMOL/L (ref 98–107)
CO2 SERPL-SCNC: 26 MMOL/L (ref 21–32)
CREAT SERPL-MCNC: 1.2 MG/DL (ref 0.8–1.5)
DIFFERENTIAL METHOD BLD: ABNORMAL
EOSINOPHIL # BLD: 0.3 K/UL (ref 0–0.8)
EOSINOPHIL NFR BLD: 5 % (ref 0.5–7.8)
ERYTHROCYTE [DISTWIDTH] IN BLOOD BY AUTOMATED COUNT: 15 % (ref 11.9–14.6)
GLOBULIN SER CALC-MCNC: 4.5 G/DL (ref 2.3–3.5)
GLUCOSE SERPL-MCNC: 110 MG/DL (ref 65–100)
HCT VFR BLD AUTO: 40.6 % (ref 41.1–50.3)
HGB BLD-MCNC: 13.7 G/DL (ref 13.6–17.2)
IMM GRANULOCYTES # BLD AUTO: 0 K/UL (ref 0–0.5)
IMM GRANULOCYTES NFR BLD AUTO: 0 % (ref 0–5)
LYMPHOCYTES # BLD: 1.7 K/UL (ref 0.5–4.6)
LYMPHOCYTES NFR BLD: 28 % (ref 13–44)
MAGNESIUM SERPL-MCNC: 2 MG/DL (ref 1.8–2.4)
MCH RBC QN AUTO: 30 PG (ref 26.1–32.9)
MCHC RBC AUTO-ENTMCNC: 33.7 G/DL (ref 31.4–35)
MCV RBC AUTO: 89 FL (ref 79.6–97.8)
MONOCYTES # BLD: 0.6 K/UL (ref 0.1–1.3)
MONOCYTES NFR BLD: 10 % (ref 4–12)
NEUTS SEG # BLD: 3.4 K/UL (ref 1.7–8.2)
NEUTS SEG NFR BLD: 56 % (ref 43–78)
NRBC # BLD: 0 K/UL (ref 0–0.2)
PLATELET # BLD AUTO: 206 K/UL (ref 150–450)
PMV BLD AUTO: 9.7 FL (ref 9.4–12.3)
POTASSIUM SERPL-SCNC: 3.8 MMOL/L (ref 3.5–5.1)
PROT SERPL-MCNC: 7.7 G/DL (ref 6.3–8.2)
RBC # BLD AUTO: 4.56 M/UL (ref 4.23–5.6)
SODIUM SERPL-SCNC: 138 MMOL/L (ref 138–145)
TROPONIN-HIGH SENSITIVITY: 18.8 PG/ML (ref 0–14)
WBC # BLD AUTO: 6 K/UL (ref 4.3–11.1)

## 2022-04-04 PROCEDURE — 85025 COMPLETE CBC W/AUTO DIFF WBC: CPT

## 2022-04-04 PROCEDURE — 80053 COMPREHEN METABOLIC PANEL: CPT

## 2022-04-04 PROCEDURE — 93005 ELECTROCARDIOGRAM TRACING: CPT | Performed by: EMERGENCY MEDICINE

## 2022-04-04 PROCEDURE — 71045 X-RAY EXAM CHEST 1 VIEW: CPT

## 2022-04-04 PROCEDURE — 84484 ASSAY OF TROPONIN QUANT: CPT

## 2022-04-04 PROCEDURE — 99285 EMERGENCY DEPT VISIT HI MDM: CPT

## 2022-04-04 PROCEDURE — 83735 ASSAY OF MAGNESIUM: CPT

## 2022-04-04 PROCEDURE — 83880 ASSAY OF NATRIURETIC PEPTIDE: CPT

## 2022-04-04 RX ORDER — SODIUM CHLORIDE 0.9 % (FLUSH) 0.9 %
5-10 SYRINGE (ML) INJECTION AS NEEDED
Status: DISCONTINUED | OUTPATIENT
Start: 2022-04-04 | End: 2022-04-05 | Stop reason: HOSPADM

## 2022-04-04 RX ORDER — SODIUM CHLORIDE 0.9 % (FLUSH) 0.9 %
5-10 SYRINGE (ML) INJECTION EVERY 8 HOURS
Status: DISCONTINUED | OUTPATIENT
Start: 2022-04-04 | End: 2022-04-05 | Stop reason: HOSPADM

## 2022-04-05 ENCOUNTER — HOSPITAL ENCOUNTER (EMERGENCY)
Age: 62
Discharge: HOME | End: 2022-04-05
Attending: EMERGENCY MEDICINE

## 2022-04-05 VITALS
TEMPERATURE: 100 F | BODY MASS INDEX: 30.61 KG/M2 | WEIGHT: 226 LBS | HEIGHT: 72 IN | HEART RATE: 87 BPM | OXYGEN SATURATION: 95 % | DIASTOLIC BLOOD PRESSURE: 104 MMHG | RESPIRATION RATE: 27 BRPM | SYSTOLIC BLOOD PRESSURE: 173 MMHG

## 2022-04-05 DIAGNOSIS — J20.9 ACUTE BRONCHITIS, UNSPECIFIED ORGANISM: Primary | ICD-10-CM

## 2022-04-05 LAB
ATRIAL RATE: 98 BPM
CALCULATED P AXIS, ECG09: 59 DEGREES
CALCULATED R AXIS, ECG10: 79 DEGREES
CALCULATED T AXIS, ECG11: 28 DEGREES
DIAGNOSIS, 93000: NORMAL
P-R INTERVAL, ECG05: 154 MS
Q-T INTERVAL, ECG07: 350 MS
QRS DURATION, ECG06: 94 MS
QTC CALCULATION (BEZET), ECG08: 446 MS
TROPONIN-HIGH SENSITIVITY: 16.9 PG/ML (ref 0–14)
VENTRICULAR RATE, ECG03: 98 BPM

## 2022-04-05 PROCEDURE — 74011250637 HC RX REV CODE- 250/637: Performed by: EMERGENCY MEDICINE

## 2022-04-05 PROCEDURE — 84484 ASSAY OF TROPONIN QUANT: CPT

## 2022-04-05 RX ORDER — DOXYCYCLINE HYCLATE 100 MG
100 TABLET ORAL 2 TIMES DAILY
Qty: 14 TABLET | Refills: 0 | Status: SHIPPED | OUTPATIENT
Start: 2022-04-05 | End: 2022-04-12

## 2022-04-05 RX ORDER — BENZONATATE 100 MG/1
100 CAPSULE ORAL
Status: COMPLETED | OUTPATIENT
Start: 2022-04-05 | End: 2022-04-05

## 2022-04-05 RX ORDER — CLONIDINE HYDROCHLORIDE 0.1 MG/1
0.2 TABLET ORAL
Status: COMPLETED | OUTPATIENT
Start: 2022-04-05 | End: 2022-04-05

## 2022-04-05 RX ORDER — BENZONATATE 100 MG/1
100 CAPSULE ORAL
Qty: 20 CAPSULE | Refills: 0 | Status: SHIPPED | OUTPATIENT
Start: 2022-04-05

## 2022-04-05 RX ADMIN — CLONIDINE HYDROCHLORIDE 0.2 MG: 0.1 TABLET ORAL at 00:15

## 2022-04-05 RX ADMIN — BENZONATATE 100 MG: 100 CAPSULE ORAL at 00:15

## 2022-04-05 NOTE — ED NOTES
I have reviewed discharge instructions with the patient and spouse. The patient and spouse verbalized understanding. Patient left ED via Discharge Method: wheelchair to Home with (insert name of family/friend, self, transportspouse). Opportunity for questions and clarification provided. Patient given 2 scripts. To continue your aftercare when you leave the hospital, you may receive an automated call from our care team to check in on how you are doing. This is a free service and part of our promise to provide the best care and service to meet your aftercare needs.  If you have questions, or wish to unsubscribe from this service please call [phone]. Thank you for Choosing our [facility] Department.

## 2022-04-05 NOTE — ED TRIAGE NOTES
Pt states he has been coughing for the last 3 days states he is taking otc medication for sinus      States that he has a lot of sinus drainage that is rolling down his throat

## 2022-04-05 NOTE — ED PROVIDER NOTES
26-year-old male with a history of congestive heart failure, chronic kidney disease, alcohol abuse, hypertension, seizures, sleep apnea, impaired fasting glucose presents with 3 days of cough productive of yellow sputum, chest and back pain associated with cough, sinus drainage, and shortness of breath. He reports subjective fevers at home. He has had Covid vaccine without any recent exposures. He has been compliant with blood pressure medication. Cough  Associated symptoms include chest pain and shortness of breath. Pertinent negatives include no headaches, no nausea, no vomiting and no confusion. Past Medical History:   Diagnosis Date    Arthritis     CHF (congestive heart failure) ([facility] [address] 7/20/2014    with acute/chronic systolic CHF in face of malignant hypertension and polysubstance abuse to include alcohol, cocaine    CHF (congestive heart failure) ([facility] [address] 2/20/15    echo: LVEF 50-55%, mild to mod . MR    Chronic kidney disease     stage III per cardiology ov note 2/25/15 (cr. 1.5)    COVID-19 virus infection 1/27/2021    Diverticulosis of large intestine without diverticulitis 2016    History of alcohol abuse 7/22/2014    History of drug abuse ([facility] [address] 7/22/14    History of echocardiogram echo: 2/20/15    LVEF 50-55%, mild to mod. MR, mild to moderate concentric LV hypertrophy.     Hx of colonic polyps 2016    Hypertension     IFG (impaired fasting glucose) 12/23/2019    Persistent disorder of initiating or maintaining sleep 1/28/2019    Reactive depression 10/12/2021    Seizures ([facility] [place] [address]     Sleep apnea        Past Surgical History:   Procedure Laterality Date    HX COLONOSCOPY  last 2/8/16    [facility]--five small sigmoid polyps--5-7 year recall         Family History:   Problem Relation Age of Onset    Alcohol abuse Mother     Heart Attack Other     Heart Disease Brother 54       Social History     Socioeconomic History    Marital status:      Spouse name: Not on file    Number of children: 3    Years of education: Not on file    Highest education level: Not on file   Occupational History    Occupation: cook   Tobacco Use    Smoking status: Never Smoker    Smokeless tobacco: Never Used   Substance and Sexual Activity    Alcohol use: Not Currently    Drug use: Yes     Types: Cocaine     Comment: not used cocaine since July 2014    Sexual activity: Not on file   Other Topics Concern     Service Not Asked    Blood Transfusions Not Asked    Caffeine Concern Not Asked    Occupational Exposure Not Asked    Hobby Hazards Not Asked    Sleep Concern Not Asked    Stress Concern Not Asked    Weight Concern Not Asked    Special Diet Not Asked    Back Care Not Asked    Exercise Not Asked    Bike Helmet Not Asked   [address] Not Asked    Self-Exams Not Asked   Social History Narrative    Not on file     Social Determinants of Health     Financial Resource Strain:     Difficulty of Paying Living Expenses: Not on file   Food Insecurity:     Worried About Running Out of Food in the Last Year: Not on file    Noel of Food in the Last Year: Not on file   Transportation Needs:     Lack of Transportation (Medical): Not on file    Lack of Transportation (Non-Medical):  Not on file   Physical Activity:     Days of Exercise per Week: Not on file    Minutes of Exercise per Session: Not on file   Stress:     Feeling of Stress : Not on file   Social Connections:     Frequency of Communication with Friends and Family: Not on file    Frequency of Social Gatherings with Friends and Family: Not on file    Attends Taoism Services: Not on file    Active Member of Clubs or Organizations: Not on file    Attends Club or Organization Meetings: Not on file    Marital Status: Not on file   Intimate Partner Violence:     Fear of Current or Ex-Partner: Not on file    Emotionally Abused: Not on file    Physically Abused: Not on file    Sexually Abused: Not on file Housing Stability:     Unable to Pay for Housing in the Last Year: Not on file    Number of Places Lived in the Last Year: Not on file    Unstable Housing in the Last Year: Not on file         ALLERGIES: Sunflower seed and Pine nut    Review of Systems   Constitutional: Positive for fatigue and fever. HENT: Positive for congestion and sinus pressure. Negative for hearing loss. Eyes: Negative for visual disturbance. Respiratory: Positive for cough and shortness of breath. Cardiovascular: Positive for chest pain. Gastrointestinal: Negative for abdominal pain, diarrhea, nausea and vomiting. Musculoskeletal: Positive for back pain. Skin: Negative for rash. Neurological: Negative for headaches. Psychiatric/Behavioral: Negative for confusion. All other systems reviewed and are negative. Vitals:    04/04/22 2156   BP: (!) 175/110   Pulse: 96   Resp: 18   Temp: 100 °F (37.8 °C)   SpO2: 96%   Weight: 102.5 kg (226 lb)   Height: 6' (1.829 m)            Physical Exam  Vitals and nursing note reviewed. Constitutional:       Appearance: Normal appearance. He is well-developed. HENT:      Head: Normocephalic and atraumatic. Nose: Nose normal.      Mouth/Throat:      Mouth: Mucous membranes are moist.   Eyes:      Pupils: Pupils are equal, round, and reactive to light. Cardiovascular:      Rate and Rhythm: Regular rhythm. Tachycardia present. Heart sounds: Normal heart sounds. Pulmonary:      Effort: Pulmonary effort is normal.      Breath sounds: Normal breath sounds. Abdominal:      Palpations: Abdomen is soft. Tenderness: There is no abdominal tenderness. Musculoskeletal:         General: No deformity. Normal range of motion. Cervical back: Normal range of motion and neck supple. Skin:     General: Skin is warm and dry. Neurological:      General: No focal deficit present. Mental Status: He is alert. Mental status is at baseline.    Psychiatric: Mood and Affect: Mood normal.         Behavior: Behavior normal.          MDM  Number of Diagnoses or Management Options  Diagnosis management comments: Parts of this document were created using dragon voice recognition software. The chart has been reviewed but errors may still be present. I wore appropriate PPE throughout this patient's ED visit. Lucius Neves MD, 12:16 AM    Low grade fever upon arrival with tachycardia. Will rx doxy and tessalon. BP treated. I discussed the results of all labs, procedures, radiographs, and treatments with the patient and available family. Treatment plan is agreed upon and the patient is ready for discharge. Questions about treatment in the ED and differential diagnosis of presenting condition were answered. Patient was given verbal discharge instructions including, but not limited to, importance of returning to the emergency department for any concern of worsening or continued symptoms. Instructions were given to follow up with a primary care provider or specialist within 1-2 days. Adverse effects of medications, if prescribed, were discussed and patient was advised to refrain from significant physical activity until followed up by primary care physician and to not drive or operate heavy machinery after taking any sedating substances.            Amount and/or Complexity of Data Reviewed  Clinical lab tests: reviewed and ordered (Results for orders placed or performed during the hospital encounter of 04/05/22  -CBC WITH AUTOMATED DIFF:        Result                      Value             Ref Range           WBC                         6.0               4.3 - 11.1 K*       RBC                         4.56              4.23 - 5.6 M*       HGB                         13.7              13.6 - 17.2 *       HCT                         40.6 (L)          41.1 - 50.3 %       MCV                         89.0              79.6 - 97.8 *       MCH                         30.0 26.1 - 32.9 *       MCHC                        33.7              31.4 - 35.0 *       RDW                         15.0 (H)          11.9 - 14.6 %       PLATELET                    206               150 - 450 K/*       MPV                         9.7               9.4 - 12.3 FL       ABSOLUTE NRBC               0.00              0.0 - 0.2 K/*       DF                          AUTOMATED                             NEUTROPHILS                 56                43 - 78 %           LYMPHOCYTES                 28                13 - 44 %           MONOCYTES                   10                4.0 - 12.0 %        EOSINOPHILS                 5                 0.5 - 7.8 %         BASOPHILS                   1                 0.0 - 2.0 %         IMMATURE GRANULOCYTES       0                 0.0 - 5.0 %         ABS. NEUTROPHILS            3.4               1.7 - 8.2 K/*       ABS. LYMPHOCYTES            1.7               0.5 - 4.6 K/*       ABS. MONOCYTES              0.6               0.1 - 1.3 K/*       ABS. EOSINOPHILS            0.3               0.0 - 0.8 K/*       ABS. BASOPHILS              0.0               0.0 - 0.2 K/*       ABS. IMM.  GRANS.            0.0               0.0 - 0.5 K/*  -METABOLIC PANEL, COMPREHENSIVE:        Result                      Value             Ref Range           Sodium                      138               138 - 145 mm*       Potassium                   3.8               3.5 - 5.1 mm*       Chloride                    105               98 - 107 mmo*       CO2                         26                21 - 32 mmol*       Anion gap                   7                 7 - 16 mmol/L       Glucose                     110 (H)           65 - 100 mg/*       BUN                         15                8 - 23 MG/DL        Creatinine                  1.20              0.8 - 1.5 MG*       GFR est AA                  >60               >60 ml/min/1*       GFR est non-AA              >60 >60 ml/min/1*       Calcium                     8.9               8.3 - 10.4 M*       Bilirubin, total            0.4               0.2 - 1.1 MG*       ALT (SGPT)                  54                12 - 65 U/L         AST (SGOT)                  52 (H)            15 - 37 U/L         Alk.  phosphatase            63                50 - 136 U/L        Protein, total              7.7               6.3 - 8.2 g/*       Albumin                     3.2               3.2 - 4.6 g/*       Globulin                    4.5 (H)           2.3 - 3.5 g/*       A-G Ratio                   0.7 (L)           1.2 - 3.5      -MAGNESIUM:        Result                      Value             Ref Range           Magnesium                   2.0               1.8 - 2.4 mg*  -TROPONIN-HIGH SENSITIVITY:        Result                      Value             Ref Range           Troponin-High Sensitiv*     18.8 (H)          0 - 14 pg/mL   -NT-PRO BNP:        Result                      Value             Ref Range           NT pro-BNP                  121               5 - 125 PG/ML  -EKG, 12 LEAD, INITIAL:        Result                      Value             Ref Range           Ventricular Rate            98                BPM                 Atrial Rate                 98                BPM                 P-R Interval                154               ms                  QRS Duration                94                ms                  Q-T Interval                350               ms                  QTC Calculation (Bezet)     446               ms                  Calculated P Axis           59                degrees             Calculated R Axis           79                degrees             Calculated T Axis           28                degrees             Diagnosis                                                     Normal sinus rhythm   Nonspecific ST abnormality   Abnormal ECG   When compared with ECG of 24-FEB-2022 08:11,   No significant change was found     )  Tests in the radiology section of CPT®: ordered and reviewed (XR CHEST PORT    Result Date: 4/4/2022  AP PORTABLE CHEST X-RAY 4/4/2022 10:21 PM HISTORY: Shortness of Breath  Shortness of Breath COMPARISON: February 24, 2022 FINDINGS: There is no lobar consolidation, pleural effusions or pulmonary edema.      No consolidation.    )  Tests in the medicine section of CPT®: ordered and reviewed           EKG    Date/Time: 4/5/2022 12:17 AM  Performed by: Zeinab Edmond MD  Authorized by: Zeinab Edmond MD     ECG reviewed by ED Physician in the absence of a cardiologist: yes    Interpretation:     Interpretation: abnormal    Rate:     ECG rate:  98    ECG rate assessment: normal    Rhythm:     Rhythm: sinus rhythm    Ectopy:     Ectopy: none    Conduction:     Conduction: normal    ST segments:     ST segments:  Non-specific  T waves:     T waves: non-specific

## 2022-07-16 PROCEDURE — 99285 EMERGENCY DEPT VISIT HI MDM: CPT

## 2022-07-17 ENCOUNTER — HOSPITAL ENCOUNTER (EMERGENCY)
Dept: GENERAL RADIOLOGY | Age: 62
Discharge: HOME | End: 2022-07-20

## 2022-07-17 ENCOUNTER — HOSPITAL ENCOUNTER (EMERGENCY)
Age: 62
Discharge: HOME | End: 2022-07-17
Attending: EMERGENCY MEDICINE

## 2022-07-17 VITALS
DIASTOLIC BLOOD PRESSURE: 111 MMHG | HEART RATE: 77 BPM | TEMPERATURE: 97.9 F | RESPIRATION RATE: 18 BRPM | BODY MASS INDEX: 30.48 KG/M2 | SYSTOLIC BLOOD PRESSURE: 169 MMHG | WEIGHT: 230 LBS | HEIGHT: 73 IN | OXYGEN SATURATION: 94 %

## 2022-07-17 DIAGNOSIS — I10 ESSENTIAL HYPERTENSION: ICD-10-CM

## 2022-07-17 DIAGNOSIS — R07.9 CHEST PAIN, UNSPECIFIED TYPE: Primary | ICD-10-CM

## 2022-07-17 DIAGNOSIS — K21.00 GASTROESOPHAGEAL REFLUX DISEASE WITH ESOPHAGITIS WITHOUT HEMORRHAGE: ICD-10-CM

## 2022-07-17 LAB
ALBUMIN SERPL-MCNC: 3 G/DL (ref 3.2–4.6)
ALBUMIN/GLOB SERPL: 0.7 {RATIO} (ref 1.2–3.5)
ALP SERPL-CCNC: 74 U/L (ref 50–136)
ALT SERPL-CCNC: 54 U/L (ref 12–65)
ANION GAP SERPL CALC-SCNC: 9 MMOL/L (ref 7–16)
AST SERPL-CCNC: 52 U/L (ref 15–37)
BILIRUB SERPL-MCNC: 0.9 MG/DL (ref 0.2–1.1)
BUN SERPL-MCNC: 17 MG/DL (ref 8–23)
CALCIUM SERPL-MCNC: 7.6 MG/DL (ref 8.3–10.4)
CHLORIDE SERPL-SCNC: 107 MMOL/L (ref 98–107)
CO2 SERPL-SCNC: 24 MMOL/L (ref 21–32)
CREAT SERPL-MCNC: 1.3 MG/DL (ref 0.8–1.5)
EKG ATRIAL RATE: 88 BPM
EKG DIAGNOSIS: NORMAL
EKG P AXIS: 55 DEGREES
EKG P-R INTERVAL: 182 MS
EKG Q-T INTERVAL: 406 MS
EKG QRS DURATION: 104 MS
EKG QTC CALCULATION (BAZETT): 491 MS
EKG R AXIS: 74 DEGREES
EKG T AXIS: 34 DEGREES
EKG VENTRICULAR RATE: 88 BPM
ERYTHROCYTE [DISTWIDTH] IN BLOOD BY AUTOMATED COUNT: 14.9 % (ref 11.9–14.6)
GLOBULIN SER CALC-MCNC: 4.3 G/DL (ref 2.3–3.5)
GLUCOSE SERPL-MCNC: 221 MG/DL (ref 65–100)
HCT VFR BLD AUTO: 39.8 % (ref 41.1–50.3)
HGB BLD-MCNC: 14.2 G/DL (ref 13.6–17.2)
LIPASE SERPL-CCNC: 120 U/L (ref 73–393)
MAGNESIUM SERPL-MCNC: 2.1 MG/DL (ref 1.8–2.4)
MCH RBC QN AUTO: 32.6 PG (ref 26.1–32.9)
MCHC RBC AUTO-ENTMCNC: 35.7 G/DL (ref 31.4–35)
MCV RBC AUTO: 91.5 FL (ref 79.6–97.8)
NRBC # BLD: 0 K/UL (ref 0–0.2)
PLATELET # BLD AUTO: 234 K/UL (ref 150–450)
PMV BLD AUTO: 9.5 FL (ref 9.4–12.3)
POTASSIUM SERPL-SCNC: 3.2 MMOL/L (ref 3.5–5.1)
PROT SERPL-MCNC: 7.3 G/DL (ref 6.3–8.2)
RBC # BLD AUTO: 4.35 M/UL (ref 4.23–5.6)
SODIUM SERPL-SCNC: 140 MMOL/L (ref 136–145)
TROPONIN I SERPL HS-MCNC: 20.5 PG/ML (ref 0–14)
TROPONIN I SERPL HS-MCNC: 24.8 PG/ML (ref 0–14)
WBC # BLD AUTO: 5.4 K/UL (ref 4.3–11.1)

## 2022-07-17 PROCEDURE — 71046 X-RAY EXAM CHEST 2 VIEWS: CPT

## 2022-07-17 PROCEDURE — 6370000000 HC RX 637 (ALT 250 FOR IP): Performed by: EMERGENCY MEDICINE

## 2022-07-17 PROCEDURE — 83690 ASSAY OF LIPASE: CPT

## 2022-07-17 PROCEDURE — 84484 ASSAY OF TROPONIN QUANT: CPT

## 2022-07-17 PROCEDURE — 83735 ASSAY OF MAGNESIUM: CPT

## 2022-07-17 PROCEDURE — 85027 COMPLETE CBC AUTOMATED: CPT

## 2022-07-17 PROCEDURE — 93005 ELECTROCARDIOGRAM TRACING: CPT | Performed by: EMERGENCY MEDICINE

## 2022-07-17 PROCEDURE — 80053 COMPREHEN METABOLIC PANEL: CPT

## 2022-07-17 RX ORDER — ASPIRIN 81 MG/1
324 TABLET, CHEWABLE ORAL
Status: COMPLETED | OUTPATIENT
Start: 2022-07-17 | End: 2022-07-17

## 2022-07-17 RX ORDER — AMLODIPINE BESYLATE 5 MG/1
5 TABLET ORAL DAILY
Qty: 30 TABLET | Refills: 0 | Status: SHIPPED | OUTPATIENT
Start: 2022-07-17

## 2022-07-17 RX ORDER — AMLODIPINE BESYLATE 10 MG/1
5 TABLET ORAL DAILY
Status: DISCONTINUED | OUTPATIENT
Start: 2022-07-17 | End: 2022-07-17 | Stop reason: HOSPADM

## 2022-07-17 RX ADMIN — ASPIRIN 324 MG: 81 TABLET, CHEWABLE ORAL at 04:11

## 2022-07-17 RX ADMIN — NITROGLYCERIN 1 INCH: 20 OINTMENT TOPICAL at 04:14

## 2022-07-17 ASSESSMENT — PAIN SCALES - GENERAL
PAINLEVEL_OUTOF10: 5
PAINLEVEL_OUTOF10: 5
PAINLEVEL_OUTOF10: 4
PAINLEVEL_OUTOF10: 5

## 2022-07-17 ASSESSMENT — ENCOUNTER SYMPTOMS
SHORTNESS OF BREATH: 0
VOMITING: 0
CHEST TIGHTNESS: 1
ABDOMINAL PAIN: 1
NAUSEA: 0

## 2022-07-17 ASSESSMENT — PAIN DESCRIPTION - LOCATION
LOCATION: CHEST

## 2022-07-17 ASSESSMENT — PAIN - FUNCTIONAL ASSESSMENT
PAIN_FUNCTIONAL_ASSESSMENT: 0-10
PAIN_FUNCTIONAL_ASSESSMENT: 0-10

## 2022-07-17 NOTE — ED PROVIDER NOTES
history of heart disease but has not followed with his cardiologist recently because of insurance issue. His blood pressure is normally elevated in the 150 range but never in the 180s. The patient says that the pain has been constant since onset. He denies any diaphoretic episodes or fevers or chills      All other systems reviewed and are negative. Review of Systems   Constitutional:  Negative for diaphoresis. Respiratory:  Positive for chest tightness. Negative for shortness of breath. Cardiovascular:  Positive for chest pain. Gastrointestinal:  Positive for abdominal pain. Negative for nausea and vomiting. All other systems reviewed and are negative. Past Medical History:   Diagnosis Date    Arthritis     CHF (congestive heart failure) ([address] 7/20/2014    with acute/chronic systolic CHF in face of malignant hypertension and polysubstance abuse to include alcohol, cocaine    CHF (congestive heart failure) ([address] 2/20/15    echo: LVEF 50-55%, mild to mod . MR    Chronic kidney disease     stage III per cardiology ov note 2/25/15 (cr. 1.5)    COVID-19 virus infection 1/27/2021    Diverticulosis of large intestine without diverticulitis 2016    History of alcohol abuse 7/22/2014    History of drug abuse ([address] 7/22/14    History of echocardiogram echo: 2/20/15    LVEF 50-55%, mild to mod. MR, mild to moderate concentric LV hypertrophy.     Hx of colonic polyps 2016    Hypertension     IFG (impaired fasting glucose) 12/23/2019    Persistent disorder of initiating or maintaining sleep 1/28/2019    Reactive depression 10/12/2021    Seizures ([address]     Sleep apnea         Past Surgical History:   Procedure Laterality Date    COLONOSCOPY  last 2/8/16    [facility]--five small sigmoid polyps--5-7 year recall        Family History   Problem Relation Age of Onset    Heart Disease Brother 54    Heart Attack Other     Alcohol Abuse Mother         Social Connections: Not on file        Allergies   Allergen Reactions Sunflower Oil Anaphylaxis    Macadamia Nut Oil Swelling        Vitals signs and nursing note reviewed. Patient Vitals for the past 4 hrs:   Temp Pulse Resp BP SpO2   07/17/22 0515 -- 83 20 (!) 168/98 --   07/17/22 0445 -- 88 17 (!) 176/111 93 %   07/17/22 0342 97.9 °F (36.6 °C) 86 22 (!) 188/118 96 %          Physical Exam     GENERAL:The patient has Body mass index is 30.34 kg/m². Well-hydrated. VITAL SIGNS: Heart rate, blood pressure, respiratory rate reviewed as recorded in  nurse's notes  EYES: Pupils reactive. Extraocular motion intact. No conjunctival redness or drainage. NECK: Supple, no meningeal signs. Trachea midline. No masses or thyromegaly. LUNGS: Breath sounds clear and equal bilaterally no accessory muscle use. CHEST: No deformity or crepitus appreciated. Reproducible tenderness on the distal sternum. CARDIOVASCULAR: Regular rate and rhythm  ABDOMEN: Soft with tenderness palpation in the epigastric region. No palpable masses or organomegaly. No peritoneal signs. No rigidity. EXTREMITIES: No clubbing or cyanosis. No joint swelling. Normal muscle tone. No  restricted range of motion appreciated. NEUROLOGIC: Sensation is grossly intact. Cranial nerve exam reveals face is  symmetrical, tongue is midline speech is clear. SKIN: No rash or petechiae. Good skin turgor palpated. PSYCHIATRIC: Alert and oriented. Appropriate behavior and judgment. EKG 12 Lead    Date/Time: 7/17/2022 3:56 AM  Performed by: Kristy Lilly DO  Authorized by: Kristy Lilly DO     ECG reviewed by ED Physician in the absence of a cardiologist: yes    Comments:      Sinus rhythm at a rate of 88 bpm.  Narrow QRS complex. No ST elevation present. Early repolarization in V2.       Labs Reviewed   CBC - Abnormal; Notable for the following components:       Result Value    Hematocrit 39.8 (*)     MCHC 35.7 (*)     RDW 14.9 (*)     All other components within normal limits   COMPREHENSIVE METABOLIC PANEL - Abnormal; Notable for the following components:    Potassium 3.2 (*)     Glucose 221 (*)     GFR Non-African American 60 (*)     Calcium 7.6 (*)     AST 52 (*)     Albumin 3.0 (*)     Globulin 4.3 (*)     Albumin/Globulin Ratio 0.7 (*)     All other components within normal limits   TROPONIN - Abnormal; Notable for the following components:    Troponin, High Sensitivity 24.8 (*)     All other components within normal limits   TROPONIN - Abnormal; Notable for the following components:    Troponin, High Sensitivity 20.5 (*)     All other components within normal limits   MAGNESIUM   LIPASE        XR CHEST (2 VW)   Final Result   No acute process. ED Course as of 07/17/22 0528   Sun Jul 17, 2022   0400 XR CHEST (2 VW)  IMPRESSION:  No acute process. [HR]   5047 I talked to the patient and he says that his pain has improved. Blood pressure still mildly elevated and I encouraged him to monitor this closely. we talked about starting to take an acid reflux medicine over-the-counter for the next 2 weeks to see if this helps improve his symptoms and change his diet he is agreeable with this. If this does not improve his symptoms he is going to follow-up with his cardiologist. David Clayton      ED Course User Index  [KH] Anel Plata DO        Voice dictation software was used during the making of this note. This software is not perfect and grammatical and other typographical errors may be present. This note has not been completely proofread for errors.        Anel Plata DO  07/17/22 [id number]

## 2022-07-17 NOTE — ED TRIAGE NOTES
Pt arrived via POV c/o cp X2 hours. Pt states that he ate dinner and laid down and woke up with cp.  Denies NVD, shob, cough

## 2022-07-17 NOTE — DISCHARGE INSTRUCTIONS
Call and try and schedule an outpatient follow-up with your cardiologist for the next 2 to 3 weeks. If your symptoms persist or you develop new concerning symptoms return immediately to the emergency department. Start taking an over-the-counter acid reflux medication daily and avoid any spicy fatty or fried foods to see if this will help improve your symptoms. Monitor your blood pressure closely and if it remains elevated you will need to start taking the amlodipine once daily. Follow-up with your family doctor this week.

## 2022-07-17 NOTE — ED NOTES
I have reviewed discharge instructions with the patient. The patient verbalized understanding. Patient left ED via Discharge Method: ambulatory to Home with visitor. Opportunity for questions and clarification provided. Patient given 1 scripts. To continue your aftercare when you leave the hospital, you may receive an automated call from our care team to check in on how you are doing. This is a free service and part of our promise to provide the best care and service to meet your aftercare needs.  If you have questions, or wish to unsubscribe from this service please call [phone]. Thank you for Choosing our [facility] Emergency Department.       David Oakes RN  07/17/22 7333

## 2022-07-21 ENCOUNTER — COMMUNITY OUTREACH (OUTPATIENT)
Dept: INTERNAL MEDICINE CLINIC | Facility: CLINIC | Age: 62
End: 2022-07-21

## 2022-07-21 NOTE — PROGRESS NOTES
Patient's HM shows they are overdue for Colorectal Screening. [employer] and  files searched with success. Results attached to order and HM updated.

## 2023-01-29 ENCOUNTER — APPOINTMENT (OUTPATIENT)
Dept: GENERAL RADIOLOGY | Age: 63
End: 2023-01-29

## 2023-01-29 ENCOUNTER — HOSPITAL ENCOUNTER (EMERGENCY)
Age: 63
Discharge: HOME | End: 2023-01-29
Attending: EMERGENCY MEDICINE

## 2023-01-29 VITALS
RESPIRATION RATE: 16 BRPM | HEIGHT: 73 IN | DIASTOLIC BLOOD PRESSURE: 121 MMHG | WEIGHT: 235 LBS | HEART RATE: 93 BPM | TEMPERATURE: 98.1 F | OXYGEN SATURATION: 97 % | BODY MASS INDEX: 31.14 KG/M2 | SYSTOLIC BLOOD PRESSURE: 154 MMHG

## 2023-01-29 DIAGNOSIS — I10 UNCONTROLLED HYPERTENSION: ICD-10-CM

## 2023-01-29 DIAGNOSIS — M25.571 ACUTE RIGHT ANKLE PAIN: Primary | ICD-10-CM

## 2023-01-29 PROCEDURE — 99283 EMERGENCY DEPT VISIT LOW MDM: CPT

## 2023-01-29 PROCEDURE — 6370000000 HC RX 637 (ALT 250 FOR IP): Performed by: NURSE PRACTITIONER

## 2023-01-29 PROCEDURE — 73630 X-RAY EXAM OF FOOT: CPT

## 2023-01-29 PROCEDURE — 73610 X-RAY EXAM OF ANKLE: CPT

## 2023-01-29 RX ORDER — NAPROXEN 500 MG/1
500 TABLET ORAL 2 TIMES DAILY WITH MEALS
Qty: 6 TABLET | Refills: 0 | Status: SHIPPED | OUTPATIENT
Start: 2023-01-29 | End: 2023-02-01

## 2023-01-29 RX ORDER — LISINOPRIL 20 MG/1
20 TABLET ORAL DAILY
Qty: 30 TABLET | Refills: 0 | Status: SHIPPED | OUTPATIENT
Start: 2023-01-29 | End: 2023-02-28

## 2023-01-29 RX ORDER — FUROSEMIDE 40 MG/1
20 TABLET ORAL DAILY
Status: DISCONTINUED | OUTPATIENT
Start: 2023-01-29 | End: 2023-01-29 | Stop reason: HOSPADM

## 2023-01-29 RX ORDER — LISINOPRIL 20 MG/1
20 TABLET ORAL
Status: COMPLETED | OUTPATIENT
Start: 2023-01-29 | End: 2023-01-29

## 2023-01-29 RX ORDER — FUROSEMIDE 20 MG/1
20 TABLET ORAL DAILY
Qty: 30 TABLET | Refills: 0 | Status: SHIPPED | OUTPATIENT
Start: 2023-01-29 | End: 2023-02-28

## 2023-01-29 RX ADMIN — FUROSEMIDE 20 MG: 40 TABLET ORAL at 10:33

## 2023-01-29 RX ADMIN — LISINOPRIL 20 MG: 20 TABLET ORAL at 10:32

## 2023-01-29 ASSESSMENT — PAIN SCALES - GENERAL: PAINLEVEL_OUTOF10: 10

## 2023-01-29 ASSESSMENT — PAIN DESCRIPTION - PAIN TYPE: TYPE: ACUTE PAIN

## 2023-01-29 ASSESSMENT — PAIN - FUNCTIONAL ASSESSMENT: PAIN_FUNCTIONAL_ASSESSMENT: 0-10

## 2023-01-29 ASSESSMENT — PAIN DESCRIPTION - FREQUENCY: FREQUENCY: CONTINUOUS

## 2023-01-29 ASSESSMENT — PAIN DESCRIPTION - ORIENTATION: ORIENTATION: RIGHT

## 2023-01-29 ASSESSMENT — PAIN DESCRIPTION - LOCATION: LOCATION: ANKLE

## 2023-01-29 ASSESSMENT — PAIN DESCRIPTION - DESCRIPTORS: DESCRIPTORS: ACHING;DISCOMFORT;TENDER

## 2023-01-29 NOTE — ED TRIAGE NOTES
Pt c/o R lateral ankle pain x4days, no significant injury/trauma (+)swelling  Pt has not been taking BP medication for about 6 months and ran out of lasix 3 days ago   A&Ox4

## 2023-01-29 NOTE — ED NOTES
I have reviewed discharge instructions with the patient. The patient verbalized understanding. Patient left ED via Discharge Method: ambulatory to Home with spouse. Opportunity for questions and clarification provided. Patient given 3 scripts. To continue your aftercare when you leave the hospital, you may receive an automated call from our care team to check in on how you are doing. This is a free service and part of our promise to provide the best care and service to meet your aftercare needs.  If you have questions, or wish to unsubscribe from this service please call [phone]. Thank you for Choosing our [facility] Emergency Department.         Roshni Grimes RN  01/29/23 [id number]

## 2023-01-29 NOTE — ED PROVIDER NOTES
Emergency Department Provider Note                   PCP:                Sharon Castro MD               Age: 65 y.o. Sex: male       ICD-10-CM    1. Acute right ankle pain  M25.571       2. Uncontrolled hypertension  I10           DISPOSITION     DC    Medical Decision Making  Amount and/or Complexity of Data Reviewed  Radiology: ordered. Risk  Prescription drug management. Pleasant and well-appearing 73-year-old male with right lateral ankle pain. Patient states he suspects he rolled his ankle in the preceding days but denies fall or other injury. Reports remote history of gout but states he does not think this is a gout flare. Denies recent illness, fever or chills, puncture wound, rash or other concerning feature. His exam is reassuring and he has no significant's MSK abnormality though mild tenderness overlying the right lateral malleolus with tenderness at site. Skin normal color and temperature without any rash or skin breakdown, wound, bruising there is no erythema there is no increased warmth. There is no specific foot tenderness, to include there is no midfoot tenderness, no calcaneal or Achilles tenderness. Intact DP and PT pulses, normal skin color and temperature, brisk capillary refill. No pitting edema, no pain out of portion, no pallor, no other lower extremity swelling or asymmetry. Lungs are clear. No chest pain Or difficulty breathing. No history of PE or DVT or known risk factors. Noted to have significantly elevated blood pressure. States he ran out of his blood pressure medication in preceding days, also Lasix yesterday. Chest pain, difficulty breathing, headache, N/C/W, dizziness or lightheadedness or sign of endorgan damage. Clinical suspicion of DVT, abscess, septic joints, occult fracture, gout flare, stroke, ACS, dissection or other acute emergent process.   Do not feel lab work-up indicated at this time, will treat for MSK pain, will refill the patient's prescribed antihypertensive and diuretic. Have provided contact number for new PCP for follow-up. Advised on importance of medication compliance. Given strict return precautions, referred to orthopedics. Offered further work-up to the patient and further management for symptoms, vital signs, which the patient declined. Also declined splinting, immobilizer, crutches or other. Patient is well-hydrated appearing, no distress. Nontoxic-appearing, tolerating oral intake, hemodynamically stable. All findings and plan were discussed with the patient. All questions answered. Discussed with the patient that an unremarkable evaluation in the ED does not preclude the development or presence of a serious or life threatening condition. Patient was instructed to return immediately for any worsening or change in current symptoms, or if symptoms do not continue to improve. I instructed them to follow up with their primary care provider, own specialist, or medical provider that I am recommending for him within the next 2-3 days  The patient acknowledged understanding plan of care and affirmed approval.     Signed by: VARGHESE Rothman     This note created using Dragon voice recognition software. Please excuse any accidental errors associated with its use, as note has not been fully proofread and edited. Complexity of Problem: 2 or more acute complicated illnesses (4)  The patients assessment required an independent historian: I spoke with a family member. I have conducted an independent ordering and review of X-rays. I have reviewed records from an external source: ED records from outside this hospital.  Considerations: Shared decision making was utilized in the care of this patient. Social determinant affecting care: inability to afford medications    We discussed care recommended by provider that patient declined (tests, disposition, etc).   Patient was discharged risks and benefits of hospitalization were discussed. Orders Placed This Encounter   Procedures    XR ANKLE RIGHT (MIN 3 VIEWS)    XR FOOT RIGHT (MIN 3 VIEWS)        Medications   furosemide (LASIX) tablet 20 mg (20 mg Oral Given 1/29/23 1033)   lisinopril (PRINIVIL;ZESTRIL) tablet 20 mg (20 mg Oral Given 1/29/23 1032)       New Prescriptions    No medications on file        Rick Jean-Baptiste is a 65 y.o. male who presents to the Emergency Department with chief complaint of    Chief Complaint   Patient presents with    Ankle Pain      HPI  Pleasant and very well-appearing 58-year-old male presents to the ED with his wife for evaluation of left lateral ankle pain. States that he has had pain at the left lateral ankle for the last 3-4 days. States he thinks he may have stepped on a shoe and rolled his ankle. Denies fall or other injury. Denies fevers or chills, recent illness, puncture wound. Denies chest pain or tightness, difficulty breathing, lower extremity swelling, cough or hemoptysis, GALVAN, fevers or chills, or recent weight fluctuation and all other complaint. Denies history of diabetes. Endorses history of hypertension and CHF, other as below. States that he has just run out of his Lasix and his antihypertensive medication. He is denying chest pain, headache, dizziness, blurred vision, numbness/tingling to his weakness and all other symptoms well but associate with hypertensive emergency/urgency, stroke/TIA/dissection/ACS/CHF exacerbation. Is weightbearing and ambulatory, but with pain at site. No radiating pain. Bought compression sleeve for the ankle and crutches, which have been helping his pain, but presents for evaluation due to persisting symptoms.     Review of Systems  Constitutional: As in HPI  HENT: Negative     Eyes: Negative   Respiratory: Negative  Cardiovascular: Negative  GI/: As in HPI  Musculoskeletal: As in HPI  Skin: Negative     Allergic/Immunologic: Negative  Neurological: Negative                          Past Medical History:   Diagnosis Date    Arthritis     CHF (congestive heart failure) ([facility] 7/20/2014    with acute/chronic systolic CHF in face of malignant hypertension and polysubstance abuse to include alcohol, cocaine    CHF (congestive heart failure) ([facility] 2/20/15    echo: LVEF 50-55%, mild to mod . MR    Chronic kidney disease     stage III per cardiology ov note 2/25/15 (cr. 1.5)    COVID-19 virus infection 1/27/2021    Diverticulosis of large intestine without diverticulitis 2016    History of alcohol abuse 7/22/2014    History of drug abuse ([facility] 7/22/14    History of echocardiogram echo: 2/20/15    LVEF 50-55%, mild to mod. MR, mild to moderate concentric LV hypertrophy.     Hx of colonic polyps 2016    Hypertension     IFG (impaired fasting glucose) 12/23/2019    Persistent disorder of initiating or maintaining sleep 1/28/2019    Reactive depression 10/12/2021    Seizures ([facility]     Sleep apnea         Past Surgical History:   Procedure Laterality Date    COLONOSCOPY  last 2/8/16    [facility]--five small sigmoid polyps--5-7 year recall        Family History   Problem Relation Age of Onset    Heart Disease Brother 54    Heart Attack Other     Alcohol Abuse Mother         Social History     Socioeconomic History    Marital status:      Spouse name: None    Number of children: None    Years of education: None    Highest education level: None   Tobacco Use    Smoking status: Never    Smokeless tobacco: Never   Substance and Sexual Activity    Alcohol use: Not Currently    Drug use: Yes     Types: Cocaine        Allergies: Sunflower oil and Macadamia nut oil    Previous Medications    ACETAMINOPHEN (TYLENOL) 325 MG TABLET    Take 2 tablets (650 mg) by mouth every 6 (six) hours as needed for mild pain, moderate pain, headaches or fever    ALBUTEROL SULFATE  (90 BASE) MCG/ACT INHALER    Inhale 1 puff into the lungs every 6 hours as needed    AMLODIPINE (NORVASC) 5 MG TABLET    Take 1 tablet by mouth in the morning.    ASPIRIN 81 MG EC TABLET    Take 81 mg by mouth every 7 days    ATORVASTATIN (LIPITOR) 40 MG TABLET    Take 40 mg by mouth daily    BENZONATATE (TESSALON) 100 MG CAPSULE    Take 100 mg by mouth 3 times daily as needed    CARVEDILOL (COREG) 25 MG TABLET    Take 25 mg by mouth 2 times daily (with meals)    CHOLECALCIFEROL 50 MCG (2000 UT) TABS    Take 1 tablet (2,000 Units) by mouth daily    ESCITALOPRAM (LEXAPRO) 5 MG TABLET    Take 5 mg by mouth daily    FUROSEMIDE (LASIX) 20 MG TABLET    Take 20 mg by mouth daily    LEVETIRACETAM (KEPPRA) 1000 MG TABLET    Take 1 tablet (1,000 mg) by mouth 2 (two) times a day    LISINOPRIL (PRINIVIL;ZESTRIL) 20 MG TABLET    TAKE 1 TABLET BY MOUTH IN THE MORNING    METFORMIN (GLUCOPHAGE-XR) 500 MG EXTENDED RELEASE TABLET    Take 500 mg by mouth Daily with supper    OXYGEN    1-2 L at night    POTASSIUM CHLORIDE (KLOR-CON M) 10 MEQ EXTENDED RELEASE TABLET    Take 10 mEq by mouth daily as needed    SPIRONOLACTONE (ALDACTONE) 25 MG TABLET    Take 25 mg by mouth daily    THIAMINE 100 MG TABLET    Take 1 tablet (100 mg) by mouth daily Do not start before February 24, 2021.        Vitals signs and nursing note reviewed.   Patient Vitals for the past 4 hrs:   Temp Pulse Resp BP SpO2   01/29/23 1019 98.1 °F (36.7 °C) 92 17 (!) 179/124 98 %          Physical Exam   Constitutional: Oriented to person, place, and time. Appears well-developed and well-nourished. No distress.    HENT:    Head: Normocephalic and atraumatic   Right Ear: External ear normal.    Left Ear: External ear normal.     Nose: Nose normal.   Mouth/Throat: Mouth normal.    Eyes: Conjunctivae are normal.   Neck: Supple. No tracheal deviation.   Cardiovascular: Normal rate, intact distal pulses. Brisk capillary refill intact, less than 2 seconds. Regular rhythm present. No pitting edema.  Pulmonary/Chest: Lungs are clear & equal bilaterally. No adventitious sounds. No respiratory distress.    Abdominal: Soft.  There is no tenderness. Musculoskeletal: No obvious deformity, erythema. There is tenderness at the left lateral ankle. No other lower extremity tenderness, there is mild swelling at the right lateral ankle with no other extremity swelling, no pitting edema, no calf tenderness, no palpable cords. Normal skin color and temperature. Redness or instability, no deformity. No calcaneal, Achilles, midfoot/forefoot/toe tenderness or abnormality. No wound. Neurological: Alert and oriented to person, place, and time. No numbness/tingling. No loss of sensation. Positive PMS ×4. GCS= 15. Skin: Skin is warm and dry. Capillary refill takes less than 2 seconds. No abrasion, no lesion, no petechiae and no rash noted. Not diaphoretic. No cyanosis, erythema, or pallor. Psychiatric: Normal mood and affect. Behavior is normal.    Nursing note and vitals reviewed. Procedures    ED EKG Interpretation  EKG was interpreted in the absence of a cardiologist.  Is this patient to be included in the SEP-1 core measure due to severe sepsis or septic shock? No Exclusion criteria - the patient is NOT to be included for SEP-1 Core Measure due to: Infection is not suspected     No results found for any visits on 01/29/23. XR ANKLE RIGHT (MIN 3 VIEWS)   Final Result   Chronic appearing changes present without acute fracture. There is   lateral subcutaneous soft tissue edema. XR FOOT RIGHT (MIN 3 VIEWS)   Final Result   Chronic appearing changes present without acute fracture. There is   lateral subcutaneous soft tissue edema. Voice dictation software was used during the making of this note. This software is not perfect and grammatical and other typographical errors may be present. This note has not been completely proofread for errors.       Gene Humphreys, SLICK - CNP  01/29/23 1642       Ami Byrd, SLICK - CNP  01/29/23 1645

## 2023-01-29 NOTE — ED NOTES
Pt reports has walking boot at home to use, declines at this time, PA notified.       Sherif Rocha RN  01/29/23 9817

## 2023-05-25 ENCOUNTER — APPOINTMENT (OUTPATIENT)
Dept: GENERAL RADIOLOGY | Age: 63
End: 2023-05-25

## 2023-05-25 ENCOUNTER — HOSPITAL ENCOUNTER (EMERGENCY)
Age: 63
Discharge: HOME | End: 2023-05-25
Attending: EMERGENCY MEDICINE

## 2023-05-25 VITALS
BODY MASS INDEX: 31.83 KG/M2 | TEMPERATURE: 98.1 F | SYSTOLIC BLOOD PRESSURE: 164 MMHG | RESPIRATION RATE: 18 BRPM | HEART RATE: 80 BPM | OXYGEN SATURATION: 95 % | WEIGHT: 235 LBS | DIASTOLIC BLOOD PRESSURE: 113 MMHG | HEIGHT: 72 IN

## 2023-05-25 DIAGNOSIS — Z59.89 INSURANCE COVERAGE PROBLEMS: ICD-10-CM

## 2023-05-25 DIAGNOSIS — J32.0 CHRONIC MAXILLARY SINUSITIS: Primary | ICD-10-CM

## 2023-05-25 DIAGNOSIS — Z91.148 HISTORY OF MEDICATION NONCOMPLIANCE: ICD-10-CM

## 2023-05-25 LAB
SARS-COV-2 RDRP RESP QL NAA+PROBE: NOT DETECTED
SOURCE: NORMAL

## 2023-05-25 PROCEDURE — 71046 X-RAY EXAM CHEST 2 VIEWS: CPT

## 2023-05-25 PROCEDURE — 87635 SARS-COV-2 COVID-19 AMP PRB: CPT

## 2023-05-25 PROCEDURE — 99284 EMERGENCY DEPT VISIT MOD MDM: CPT

## 2023-05-25 RX ORDER — LISINOPRIL 20 MG/1
20 TABLET ORAL DAILY
Qty: 30 TABLET | Refills: 1 | Status: SHIPPED | OUTPATIENT
Start: 2023-05-25 | End: 2023-07-24

## 2023-05-25 RX ORDER — FUROSEMIDE 20 MG/1
20 TABLET ORAL DAILY
Qty: 30 TABLET | Refills: 1 | Status: SHIPPED | OUTPATIENT
Start: 2023-05-25 | End: 2023-07-24

## 2023-05-25 RX ORDER — ATORVASTATIN CALCIUM 40 MG/1
40 TABLET, FILM COATED ORAL DAILY
Qty: 30 TABLET | Refills: 1 | Status: SHIPPED | OUTPATIENT
Start: 2023-05-25 | End: 2023-07-24

## 2023-05-25 RX ORDER — CARVEDILOL 25 MG/1
25 TABLET ORAL 2 TIMES DAILY WITH MEALS
Qty: 60 TABLET | Refills: 1 | Status: SHIPPED | OUTPATIENT
Start: 2023-05-25 | End: 2023-07-24

## 2023-05-25 RX ORDER — AZITHROMYCIN 250 MG/1
TABLET, FILM COATED ORAL
Qty: 1 PACKET | Refills: 0 | Status: SHIPPED | OUTPATIENT
Start: 2023-05-25

## 2023-05-25 RX ORDER — METFORMIN HYDROCHLORIDE 500 MG/1
500 TABLET, EXTENDED RELEASE ORAL
Qty: 30 TABLET | Refills: 1 | Status: SHIPPED | OUTPATIENT
Start: 2023-05-25 | End: 2023-07-24

## 2023-05-25 RX ORDER — POTASSIUM CHLORIDE 750 MG/1
10 TABLET, EXTENDED RELEASE ORAL DAILY PRN
Qty: 60 TABLET | Refills: 1 | Status: SHIPPED | OUTPATIENT
Start: 2023-05-25 | End: 2023-07-24

## 2023-05-25 SDOH — ECONOMIC STABILITY - INCOME SECURITY: OTHER PROBLEMS RELATED TO HOUSING AND ECONOMIC CIRCUMSTANCES: Z59.89

## 2023-05-25 ASSESSMENT — ENCOUNTER SYMPTOMS
VOICE CHANGE: 0
SINUS PRESSURE: 1

## 2023-05-25 NOTE — ED PROVIDER NOTES
completely proofread for errors.      Tianna Tamez, DO  05/25/23 [address], DO  05/25/23 [address], DO  05/25/23 1015

## 2023-05-25 NOTE — ED NOTES
I have reviewed discharge instructions with the patient. The patient verbalized understanding. Patient left ED via Discharge Method: ambulatory to Home with (insert name of family/friend, self, transport self). Opportunity for questions and clarification provided. Patient given 2 scripts. To continue your aftercare when you leave the hospital, you may receive an automated call from our care team to check in on how you are doing. This is a free service and part of our promise to provide the best care and service to meet your aftercare needs.  If you have questions, or wish to unsubscribe from this service please call [phone]. Thank you for Choosing our [facility] Emergency Department.        Lizbeth Briggs RN  05/25/23 1038

## 2023-05-25 NOTE — DISCHARGE INSTRUCTIONS
Take full course of antibiotics as prescribed for your acute sinusitis. Start taking your medications once again. Call your family doctor today and see if she can work out a payment plan with them to follow-up or reach out to one of the other resources provided to you to establish care. You need to take your blood pressure diabetes and cholesterol medicine as prescribed. We would love to help you get a primary care doctor for follow-up after your emergency department visit. Please call [phone] between 7AM - 6PM Monday to Friday. A care navigator will be able to assist you with setting up a doctor close to your home.

## 2023-05-30 ENCOUNTER — HOSPITAL ENCOUNTER (EMERGENCY)
Age: 63
Discharge: HOME | End: 2023-05-30
Attending: EMERGENCY MEDICINE

## 2023-05-30 VITALS
HEIGHT: 72 IN | OXYGEN SATURATION: 97 % | TEMPERATURE: 98.2 F | HEART RATE: 86 BPM | WEIGHT: 239 LBS | SYSTOLIC BLOOD PRESSURE: 180 MMHG | BODY MASS INDEX: 32.37 KG/M2 | DIASTOLIC BLOOD PRESSURE: 115 MMHG | RESPIRATION RATE: 17 BRPM

## 2023-05-30 DIAGNOSIS — M25.571 ACUTE RIGHT ANKLE PAIN: Primary | ICD-10-CM

## 2023-05-30 LAB
ANION GAP SERPL CALC-SCNC: 8 MMOL/L (ref 2–11)
BASOPHILS # BLD: 0 K/UL (ref 0–0.2)
BASOPHILS NFR BLD: 0 % (ref 0–2)
BUN SERPL-MCNC: 20 MG/DL (ref 8–23)
CALCIUM SERPL-MCNC: 9.5 MG/DL (ref 8.3–10.4)
CHLORIDE SERPL-SCNC: 110 MMOL/L (ref 101–110)
CO2 SERPL-SCNC: 25 MMOL/L (ref 21–32)
CREAT SERPL-MCNC: 1.6 MG/DL (ref 0.8–1.5)
DIFFERENTIAL METHOD BLD: ABNORMAL
EOSINOPHIL # BLD: 0.2 K/UL (ref 0–0.8)
EOSINOPHIL NFR BLD: 3 % (ref 0.5–7.8)
ERYTHROCYTE [DISTWIDTH] IN BLOOD BY AUTOMATED COUNT: 14.9 % (ref 11.9–14.6)
GLUCOSE SERPL-MCNC: 92 MG/DL (ref 65–100)
HCT VFR BLD AUTO: 41.9 % (ref 41.1–50.3)
HGB BLD-MCNC: 13.6 G/DL (ref 13.6–17.2)
IMM GRANULOCYTES # BLD AUTO: 0 K/UL (ref 0–0.5)
IMM GRANULOCYTES NFR BLD AUTO: 0 % (ref 0–5)
LYMPHOCYTES # BLD: 2.7 K/UL (ref 0.5–4.6)
LYMPHOCYTES NFR BLD: 33 % (ref 13–44)
MCH RBC QN AUTO: 29.4 PG (ref 26.1–32.9)
MCHC RBC AUTO-ENTMCNC: 32.5 G/DL (ref 31.4–35)
MCV RBC AUTO: 90.7 FL (ref 82–102)
MONOCYTES # BLD: 0.8 K/UL (ref 0.1–1.3)
MONOCYTES NFR BLD: 10 % (ref 4–12)
NEUTS SEG # BLD: 4.3 K/UL (ref 1.7–8.2)
NEUTS SEG NFR BLD: 54 % (ref 43–78)
NRBC # BLD: 0 K/UL (ref 0–0.2)
PLATELET # BLD AUTO: 259 K/UL (ref 150–450)
PMV BLD AUTO: 9.8 FL (ref 9.4–12.3)
POTASSIUM SERPL-SCNC: 3.6 MMOL/L (ref 3.5–5.1)
RBC # BLD AUTO: 4.62 M/UL (ref 4.23–5.6)
SODIUM SERPL-SCNC: 143 MMOL/L (ref 133–143)
URATE SERPL-MCNC: 8.4 MG/DL (ref 2.6–6)
WBC # BLD AUTO: 8 K/UL (ref 4.3–11.1)

## 2023-05-30 PROCEDURE — 6370000000 HC RX 637 (ALT 250 FOR IP): Performed by: EMERGENCY MEDICINE

## 2023-05-30 PROCEDURE — 85025 COMPLETE CBC W/AUTO DIFF WBC: CPT

## 2023-05-30 PROCEDURE — 99283 EMERGENCY DEPT VISIT LOW MDM: CPT

## 2023-05-30 PROCEDURE — 80048 BASIC METABOLIC PNL TOTAL CA: CPT

## 2023-05-30 PROCEDURE — 84550 ASSAY OF BLOOD/URIC ACID: CPT

## 2023-05-30 RX ORDER — LISINOPRIL 5 MG/1
10 TABLET ORAL DAILY
Status: DISCONTINUED | OUTPATIENT
Start: 2023-05-30 | End: 2023-05-30 | Stop reason: HOSPADM

## 2023-05-30 RX ORDER — HYDROCODONE BITARTRATE AND ACETAMINOPHEN 5; 325 MG/1; MG/1
1 TABLET ORAL EVERY 6 HOURS PRN
Qty: 10 TABLET | Refills: 0 | Status: SHIPPED | OUTPATIENT
Start: 2023-05-30 | End: 2023-06-02

## 2023-05-30 RX ORDER — COLCHICINE 0.6 MG/1
0.6 TABLET ORAL
Status: COMPLETED | OUTPATIENT
Start: 2023-05-30 | End: 2023-05-30

## 2023-05-30 RX ORDER — COLCHICINE 0.6 MG/1
0.6 TABLET ORAL DAILY
Qty: 30 TABLET | Refills: 3 | Status: SHIPPED | OUTPATIENT
Start: 2023-05-30

## 2023-05-30 RX ORDER — PREDNISONE 10 MG/1
40 TABLET ORAL DAILY
Status: DISCONTINUED | OUTPATIENT
Start: 2023-05-30 | End: 2023-05-30 | Stop reason: HOSPADM

## 2023-05-30 RX ORDER — PREDNISONE 20 MG/1
20 TABLET ORAL 2 TIMES DAILY
Qty: 10 TABLET | Refills: 0 | Status: SHIPPED | OUTPATIENT
Start: 2023-05-30 | End: 2023-06-04

## 2023-05-30 RX ORDER — HYDROCODONE BITARTRATE AND ACETAMINOPHEN 7.5; 325 MG/1; MG/1
1 TABLET ORAL
Status: COMPLETED | OUTPATIENT
Start: 2023-05-30 | End: 2023-05-30

## 2023-05-30 RX ADMIN — COLCHICINE 0.6 MG: 0.6 TABLET, FILM COATED ORAL at 16:47

## 2023-05-30 RX ADMIN — LISINOPRIL 10 MG: 5 TABLET ORAL at 16:29

## 2023-05-30 RX ADMIN — PREDNISONE 40 MG: 10 TABLET ORAL at 16:48

## 2023-05-30 RX ADMIN — HYDROCODONE BITARTRATE AND ACETAMINOPHEN 1 TABLET: 7.5; 325 TABLET ORAL at 16:32

## 2023-05-30 ASSESSMENT — PAIN DESCRIPTION - LOCATION: LOCATION: ANKLE

## 2023-05-30 ASSESSMENT — PAIN SCALES - GENERAL
PAINLEVEL_OUTOF10: 5
PAINLEVEL_OUTOF10: 5
PAINLEVEL_OUTOF10: 9
PAINLEVEL_OUTOF10: 7

## 2023-05-30 ASSESSMENT — PAIN DESCRIPTION - DESCRIPTORS
DESCRIPTORS: ACHING;THROBBING
DESCRIPTORS: ACHING;THROBBING

## 2023-05-30 ASSESSMENT — LIFESTYLE VARIABLES
HOW MANY STANDARD DRINKS CONTAINING ALCOHOL DO YOU HAVE ON A TYPICAL DAY: 3 OR 4
HOW OFTEN DO YOU HAVE A DRINK CONTAINING ALCOHOL: 4 OR MORE TIMES A WEEK

## 2023-05-30 ASSESSMENT — PAIN - FUNCTIONAL ASSESSMENT: PAIN_FUNCTIONAL_ASSESSMENT: 0-10

## 2023-05-30 NOTE — ED PROVIDER NOTES
Emergency Department Provider Note       PCP: Naveen Lowe MD   Age: 65 y.o. Sex: male     DISPOSITION       No diagnosis found. Medical Decision Making     Complexity of Problems Addressed:  Acute non-life-threatening    Data Reviewed and Analyzed:  Category 1:   I independently ordered and reviewed each unique test.         Category 2:       Category 3: Discussion of management or test interpretation. Patient appears to most likely have gouty type issues. Has meds for his blood pressure though did not take them and probably somewhat escalated due to the acute discomfort and ER presents. He states he will be compliant with these. Having an elevated uric acid. He does not have significant elevation in his blood sugar. Or his white count or a differential shift that would be suspicious for infectious. We will place him on steroids and colchicine with acute symptomatic pain control for several days. He is recheck with worsening      Risk of Complications and/or Morbidity of Patient Management:  Recheck with any worsening         History      Eduardo Gusman is a 65 y.o. male who presents to the Emergency Department with chief complaint of    Chief Complaint   Patient presents with    Ankle Pain    Insect Bite      Is here with increasing pain and persistent swelling with no redness or fever to his ankle. No trauma. Has history of gout. Feels as though this could be consistent with that. Pain has been present from Sunday through day of arrival.  Has multiple baseline health issues including diabetes heart disease seizure disorder and history of COVID. No present infectious changes. Is wearing a orthopedic boot and that is provide him with some comfort when trying to ambulate with this. Patient also comes in with concerns that he might have been bitten by a bug he found a crawling on him and on further inspection it appears to be part of a real    The history is provided by the patient.

## 2023-05-30 NOTE — ED TRIAGE NOTES
Patient arrives to ED pov from home. Patient reports right ankle pain. Patient reports it has been going on for a while but he thinks it might be a gout flare up. Patient has history of gout. Patient reports he used to take Allopurinol for gout but he is out of his medication. Patient reports he was bit by something on his back. Patient reports it was some kind of bug.

## 2023-05-30 NOTE — ED NOTES
I have reviewed discharge instructions with the patient. The patient verbalized understanding. Patient left ED via Discharge Method: ambulatory to Home with wife. Opportunity for questions and clarification provided. Patient given 3 scripts. To continue your aftercare when you leave the hospital, you may receive an automated call from our care team to check in on how you are doing. This is a free service and part of our promise to provide the best care and service to meet your aftercare needs.  If you have questions, or wish to unsubscribe from this service please call [phone]. Thank you for Choosing our [address] [place] [address] Emergency Department.         Chuck Downs LPN  42/71/83 [id number]

## 2023-06-01 ASSESSMENT — ENCOUNTER SYMPTOMS
RESPIRATORY NEGATIVE: 1
BACK PAIN: 0

## 2023-06-10 ENCOUNTER — HOSPITAL ENCOUNTER (EMERGENCY)
Age: 63
Discharge: HOME | End: 2023-06-10
Attending: EMERGENCY MEDICINE
Payer: OTHER MISCELLANEOUS

## 2023-06-10 ENCOUNTER — APPOINTMENT (OUTPATIENT)
Dept: GENERAL RADIOLOGY | Age: 63
End: 2023-06-10
Payer: OTHER MISCELLANEOUS

## 2023-06-10 VITALS
TEMPERATURE: 98.2 F | SYSTOLIC BLOOD PRESSURE: 170 MMHG | RESPIRATION RATE: 18 BRPM | WEIGHT: 235 LBS | OXYGEN SATURATION: 95 % | BODY MASS INDEX: 31.87 KG/M2 | DIASTOLIC BLOOD PRESSURE: 87 MMHG | HEART RATE: 84 BPM
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
## 2023-06-10 PROCEDURE — 72100 X-RAY EXAM L-S SPINE 2/3 VWS: CPT

## 2023-06-10 PROCEDURE — 73562 X-RAY EXAM OF KNEE 3: CPT

## 2023-06-10 RX ORDER — CYCLOBENZAPRINE HCL 10 MG
10 TABLET ORAL NIGHTLY PRN
Qty: 10 TABLET | Refills: 0 | Status: SHIPPED | OUTPATIENT
Start: 2023-06-10 | End: 2023-06-20

## 2023-06-10 ASSESSMENT — PAIN DESCRIPTION - ORIENTATION: ORIENTATION: LEFT

## 2023-06-10 ASSESSMENT — PAIN DESCRIPTION - DESCRIPTORS: DESCRIPTORS: DULL

## 2023-06-10 ASSESSMENT — PAIN SCALES - GENERAL: PAINLEVEL_OUTOF10: 6

## 2023-06-10 ASSESSMENT — PAIN DESCRIPTION - LOCATION: LOCATION: KNEE;BACK
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
## 2023-06-10 NOTE — ED NOTES
I have reviewed discharge instructions with the patient. The patient verbalized understanding. Patient left ED via Discharge Method: ambulatory to Home with self. Opportunity for questions and clarification provided. Patient given 1 scripts. To continue your aftercare when you leave the hospital, you may receive an automated call from our care team to check in on how you are doing. This is a free service and part of our promise to provide the best care and service to meet your aftercare needs.  If you have questions, or wish to unsubscribe from this service please call [phone]. Thank you for Choosing our [facility] Emergency Department.         Amy Kilgore RN  06/10/23 1800

## 2023-06-21 ENCOUNTER — HOSPITAL ENCOUNTER (OUTPATIENT)
Dept: LAB | Age: 63
Discharge: HOME | End: 2023-06-24

## 2023-06-21 DIAGNOSIS — E11.9 TYPE 2 DIABETES MELLITUS WITHOUT COMPLICATION, WITHOUT LONG-TERM CURRENT USE OF INSULIN (HCC): ICD-10-CM

## 2023-06-21 DIAGNOSIS — E78.2 MIXED HYPERLIPIDEMIA: ICD-10-CM

## 2023-06-21 DIAGNOSIS — I10 ESSENTIAL HYPERTENSION: ICD-10-CM

## 2023-06-21 DIAGNOSIS — E55.9 VITAMIN D DEFICIENCY: ICD-10-CM

## 2023-06-21 DIAGNOSIS — Z00.00 ROUTINE GENERAL MEDICAL EXAMINATION AT A HEALTH CARE FACILITY: ICD-10-CM

## 2023-06-21 LAB
25(OH)D3 SERPL-MCNC: 42 NG/ML (ref 30–100)
ALBUMIN SERPL-MCNC: 3.6 G/DL (ref 3.2–4.6)
ALBUMIN/GLOB SERPL: 0.8 (ref 0.4–1.6)
ALP SERPL-CCNC: 69 U/L (ref 50–136)
ALT SERPL-CCNC: 49 U/L (ref 12–65)
ANION GAP SERPL CALC-SCNC: 6 MMOL/L (ref 2–11)
APPEARANCE UR: CLEAR
AST SERPL-CCNC: 31 U/L (ref 15–37)
BACTERIA URNS QL MICRO: NEGATIVE /HPF
BASOPHILS # BLD: 0 K/UL (ref 0–0.2)
BASOPHILS NFR BLD: 1 % (ref 0–2)
BILIRUB SERPL-MCNC: 1 MG/DL (ref 0.2–1.1)
BILIRUB UR QL: NEGATIVE
BUN SERPL-MCNC: 19 MG/DL (ref 8–23)
CALCIUM SERPL-MCNC: 9.4 MG/DL (ref 8.3–10.4)
CASTS URNS QL MICRO: ABNORMAL /LPF (ref 0–2)
CHLORIDE SERPL-SCNC: 104 MMOL/L (ref 101–110)
CHOLEST SERPL-MCNC: 178 MG/DL
CO2 SERPL-SCNC: 29 MMOL/L (ref 21–32)
COLOR UR: ABNORMAL
CREAT SERPL-MCNC: 1.4 MG/DL (ref 0.8–1.5)
CREAT UR-MCNC: 136 MG/DL
DIFFERENTIAL METHOD BLD: ABNORMAL
EOSINOPHIL # BLD: 0.2 K/UL (ref 0–0.8)
EOSINOPHIL NFR BLD: 3 % (ref 0.5–7.8)
EPI CELLS #/AREA URNS HPF: ABNORMAL /HPF (ref 0–5)
ERYTHROCYTE [DISTWIDTH] IN BLOOD BY AUTOMATED COUNT: 14.5 % (ref 11.9–14.6)
GLOBULIN SER CALC-MCNC: 4.3 G/DL (ref 2.8–4.5)
GLUCOSE SERPL-MCNC: 83 MG/DL (ref 65–100)
GLUCOSE UR STRIP.AUTO-MCNC: NEGATIVE MG/DL
HCT VFR BLD AUTO: 46.7 % (ref 41.1–50.3)
HCV AB SER QL: NONREACTIVE
HDLC SERPL-MCNC: 55 MG/DL (ref 40–60)
HDLC SERPL: 3.2
HGB BLD-MCNC: 15.2 G/DL (ref 13.6–17.2)
HGB UR QL STRIP: NEGATIVE
IMM GRANULOCYTES # BLD AUTO: 0 K/UL (ref 0–0.5)
IMM GRANULOCYTES NFR BLD AUTO: 0 % (ref 0–5)
KETONES UR QL STRIP.AUTO: NEGATIVE MG/DL
LDLC SERPL CALC-MCNC: 76.4 MG/DL
LEUKOCYTE ESTERASE UR QL STRIP.AUTO: NEGATIVE
LYMPHOCYTES # BLD: 2.8 K/UL (ref 0.5–4.6)
LYMPHOCYTES NFR BLD: 45 % (ref 13–44)
MCH RBC QN AUTO: 29.1 PG (ref 26.1–32.9)
MCHC RBC AUTO-ENTMCNC: 32.5 G/DL (ref 31.4–35)
MCV RBC AUTO: 89.5 FL (ref 82–102)
MICROALBUMIN UR-MCNC: 26.9 MG/DL
MICROALBUMIN/CREAT UR-RTO: 198 MG/G (ref 0–30)
MONOCYTES # BLD: 0.6 K/UL (ref 0.1–1.3)
MONOCYTES NFR BLD: 10 % (ref 4–12)
MUCOUS THREADS URNS QL MICRO: 0 /LPF
NEUTS SEG # BLD: 2.5 K/UL (ref 1.7–8.2)
NEUTS SEG NFR BLD: 41 % (ref 43–78)
NITRITE UR QL STRIP.AUTO: NEGATIVE
NRBC # BLD: 0 K/UL (ref 0–0.2)
PH UR STRIP: 5.5 (ref 5–9)
PLATELET # BLD AUTO: 247 K/UL (ref 150–450)
PMV BLD AUTO: 9.6 FL (ref 9.4–12.3)
POTASSIUM SERPL-SCNC: 4.5 MMOL/L (ref 3.5–5.1)
PROT SERPL-MCNC: 7.9 G/DL (ref 6.3–8.2)
PROT UR STRIP-MCNC: 30 MG/DL
RBC # BLD AUTO: 5.22 M/UL (ref 4.23–5.6)
RBC #/AREA URNS HPF: ABNORMAL /HPF (ref 0–5)
SODIUM SERPL-SCNC: 139 MMOL/L (ref 133–143)
SP GR UR REFRACTOMETRY: 1.01 (ref 1–1.02)
T4 FREE SERPL-MCNC: 0.9 NG/DL (ref 0.78–1.46)
TRIGL SERPL-MCNC: 233 MG/DL (ref 35–150)
TSH, 3RD GENERATION: 2.2 UIU/ML (ref 0.36–3.74)
URINE CULTURE IF INDICATED: ABNORMAL
UROBILINOGEN UR QL STRIP.AUTO: 0.2 EU/DL (ref 0.2–1)
VLDLC SERPL CALC-MCNC: 46.6 MG/DL (ref 6–23)
WBC # BLD AUTO: 6.2 K/UL (ref 4.3–11.1)
WBC URNS QL MICRO: ABNORMAL /HPF (ref 0–4)

## 2023-06-22 LAB
EST. AVERAGE GLUCOSE BLD GHB EST-MCNC: 123 MG/DL
HBA1C MFR BLD: 5.9 % (ref 4.8–5.6)

## 2023-08-08 ENCOUNTER — TELEPHONE (OUTPATIENT)
Dept: INTERNAL MEDICINE CLINIC | Facility: CLINIC | Age: 63
End: 2023-08-08

## 2023-08-08 NOTE — TELEPHONE ENCOUNTER
No show letter for appointment on 7/3/23 mailed out, unable to leave message to reschedule appointment.

## 2023-09-12 ENCOUNTER — HOSPITAL ENCOUNTER (EMERGENCY)
Age: 63
Discharge: HOME | End: 2023-09-12
Attending: EMERGENCY MEDICINE
Payer: MEDICARE

## 2023-09-12 ENCOUNTER — APPOINTMENT (OUTPATIENT)
Dept: GENERAL RADIOLOGY | Age: 63
End: 2023-09-12
Payer: MEDICARE

## 2023-09-12 VITALS
HEIGHT: 72 IN | OXYGEN SATURATION: 96 % | WEIGHT: 235 LBS | BODY MASS INDEX: 31.83 KG/M2 | RESPIRATION RATE: 17 BRPM | SYSTOLIC BLOOD PRESSURE: 148 MMHG | HEART RATE: 80 BPM | DIASTOLIC BLOOD PRESSURE: 97 MMHG | TEMPERATURE: 97.5 F

## 2023-09-12 DIAGNOSIS — M25.531 RIGHT WRIST PAIN: Primary | ICD-10-CM

## 2023-09-12 PROCEDURE — 73130 X-RAY EXAM OF HAND: CPT

## 2023-09-12 PROCEDURE — 73110 X-RAY EXAM OF WRIST: CPT

## 2023-09-12 PROCEDURE — 99283 EMERGENCY DEPT VISIT LOW MDM: CPT

## 2023-09-12 RX ORDER — NAPROXEN 500 MG/1
500 TABLET ORAL 2 TIMES DAILY WITH MEALS
Qty: 30 TABLET | Refills: 0 | Status: SHIPPED | OUTPATIENT
Start: 2023-09-12

## 2023-09-12 RX ORDER — PREDNISONE 10 MG/1
TABLET ORAL
Qty: 1 EACH | Refills: 0 | Status: SHIPPED | OUTPATIENT
Start: 2023-09-12

## 2023-09-12 RX ORDER — OXYCODONE HYDROCHLORIDE 10 MG/1
10 TABLET ORAL EVERY 8 HOURS PRN
Qty: 9 TABLET | Refills: 0 | Status: SHIPPED | OUTPATIENT
Start: 2023-09-12 | End: 2023-09-15

## 2023-09-12 ASSESSMENT — PAIN DESCRIPTION - PAIN TYPE: TYPE: ACUTE PAIN

## 2023-09-12 ASSESSMENT — PAIN DESCRIPTION - FREQUENCY: FREQUENCY: CONTINUOUS

## 2023-09-12 ASSESSMENT — PAIN SCALES - GENERAL: PAINLEVEL_OUTOF10: 10

## 2023-09-12 ASSESSMENT — PAIN DESCRIPTION - ORIENTATION: ORIENTATION: RIGHT

## 2023-09-12 ASSESSMENT — PAIN - FUNCTIONAL ASSESSMENT: PAIN_FUNCTIONAL_ASSESSMENT: 0-10

## 2023-09-12 ASSESSMENT — LIFESTYLE VARIABLES
HOW OFTEN DO YOU HAVE A DRINK CONTAINING ALCOHOL: 4 OR MORE TIMES A WEEK
HOW MANY STANDARD DRINKS CONTAINING ALCOHOL DO YOU HAVE ON A TYPICAL DAY: 3 OR 4

## 2023-09-12 ASSESSMENT — PAIN DESCRIPTION - LOCATION: LOCATION: WRIST

## 2023-09-12 ASSESSMENT — PAIN DESCRIPTION - DESCRIPTORS: DESCRIPTORS: ACHING;THROBBING

## 2023-09-12 NOTE — ED PROVIDER NOTES
present. Radiocarpal joint space narrowing is seen. There  is chronic ossific spurring at the radial styloid. There is a small chronic spur  dorsally on the lateral view. There is prominent chronic spurring at the ulnar  styloid with chronic ossific spurring adjacent to it. These may be sequelae of  an old injury. There is prominent soft tissue swelling at the wrist medially and dorsally  centered at the region of the ulnar styloid. Impression    PATIENT HAS SIGNIFICANT ARTHROPATHY CHANGES, AT THIS WRIST THEY MAY  BE RELATED TO OLD TRAUMA INJURY-CORRELATE CLINICALLY. PROMINENT SOFT TISSUE SWELLING IS SEEN MEDIALLY ASSOCIATED WITH THIS-AS ABOVE. NO DISPLACED ACUTE BONE FRACTURE. XR HAND RIGHT (MIN 3 VIEWS)    Narrative    XR HAND RIGHT (MIN 3 VIEWS) SERIES  9/12/2023 11:13 AM    INDICATION: Right hand pain after fall with injury. COMPARISON: None available on this hospital PACS    FINDINGS: AP, Lateral, Oblique views are submitted. Bone density is within normal limits. Alignment is normal.   There is no acute fracture, or suspicious lesion. No acute joint abnormality. There are scattered sites of DJD type arthritis findings with joint space  narrowing and spurring. Changes are focally severe at the third digit PIP joint  and then also seen at the fifth digit DIP joint, second and fifth MCP joints. .    There is some soft tissue clubbing associated with advanced arthritis at the  third digit PIP joint. Impression    NO ACUTE BONY FRACTURE, OR JOINT FINDING. PATIENT DOES HAVE SCATTERED SITES OF OSTEOARTHRITIS WITH SEVERE CHANGES AT THE  THIRD DIGIT PIP JOINT. XR WRIST RIGHT (MIN 3 VIEWS)   Final Result   PATIENT HAS SIGNIFICANT ARTHROPATHY CHANGES, AT THIS WRIST THEY MAY   BE RELATED TO OLD TRAUMA INJURY-CORRELATE CLINICALLY. PROMINENT SOFT TISSUE SWELLING IS SEEN MEDIALLY ASSOCIATED WITH THIS-AS ABOVE. NO DISPLACED ACUTE BONE FRACTURE.          XR HAND RIGHT (MIN 3

## 2023-09-12 NOTE — DISCHARGE INSTRUCTIONS
As we discussed, your x-ray today does not show any acute fractures. Take medication as prescribed. Follow-up with your primary care provider for recheck of your symptoms. Return to the emergency department for any new, worsening, or concerning symptoms.

## 2023-09-12 NOTE — ED TRIAGE NOTES
Pt reports a few days ago had mechanical fall landing on R arm (+)R wrist pain and swelling wrist and hand, radial pulse palpable   (-)head strike, LOC, blood thinners  A&Ox4

## 2023-09-13 ENCOUNTER — OFFICE VISIT (OUTPATIENT)
Dept: INTERNAL MEDICINE CLINIC | Facility: CLINIC | Age: 63
End: 2023-09-13
Payer: MEDICARE

## 2023-09-13 VITALS
DIASTOLIC BLOOD PRESSURE: 104 MMHG | OXYGEN SATURATION: 95 % | BODY MASS INDEX: 32.23 KG/M2 | TEMPERATURE: 97 F | HEIGHT: 72 IN | WEIGHT: 238 LBS | HEART RATE: 82 BPM | SYSTOLIC BLOOD PRESSURE: 163 MMHG

## 2023-09-13 DIAGNOSIS — Z23 ENCOUNTER FOR ADMINISTRATION OF VACCINE: ICD-10-CM

## 2023-09-13 DIAGNOSIS — I10 ESSENTIAL (PRIMARY) HYPERTENSION: ICD-10-CM

## 2023-09-13 DIAGNOSIS — Z12.12 SCREENING FOR COLORECTAL CANCER: ICD-10-CM

## 2023-09-13 DIAGNOSIS — E78.2 MIXED HYPERLIPIDEMIA: ICD-10-CM

## 2023-09-13 DIAGNOSIS — E11.9 TYPE 2 DIABETES MELLITUS WITHOUT COMPLICATION, WITHOUT LONG-TERM CURRENT USE OF INSULIN (HCC): Primary | ICD-10-CM

## 2023-09-13 DIAGNOSIS — Z12.11 SCREENING FOR COLORECTAL CANCER: ICD-10-CM

## 2023-09-13 DIAGNOSIS — M10.9 ACUTE GOUT OF RIGHT HAND, UNSPECIFIED CAUSE: ICD-10-CM

## 2023-09-13 DIAGNOSIS — I42.0 DILATED CARDIOMYOPATHY (HCC): ICD-10-CM

## 2023-09-13 PROBLEM — I25.2 HISTORY OF MI (MYOCARDIAL INFARCTION): Status: RESOLVED | Noted: 2021-06-01 | Resolved: 2023-09-13

## 2023-09-13 PROBLEM — U07.1 COVID-19 VIRUS INFECTION: Status: RESOLVED | Noted: 2021-01-27 | Resolved: 2023-09-13

## 2023-09-13 PROCEDURE — G0008 ADMIN INFLUENZA VIRUS VAC: HCPCS | Performed by: INTERNAL MEDICINE

## 2023-09-13 PROCEDURE — 3077F SYST BP >= 140 MM HG: CPT | Performed by: INTERNAL MEDICINE

## 2023-09-13 PROCEDURE — G8417 CALC BMI ABV UP PARAM F/U: HCPCS | Performed by: INTERNAL MEDICINE

## 2023-09-13 PROCEDURE — 90674 CCIIV4 VAC NO PRSV 0.5 ML IM: CPT | Performed by: INTERNAL MEDICINE

## 2023-09-13 PROCEDURE — 3080F DIAST BP >= 90 MM HG: CPT | Performed by: INTERNAL MEDICINE

## 2023-09-13 PROCEDURE — 99214 OFFICE O/P EST MOD 30 MIN: CPT | Performed by: INTERNAL MEDICINE

## 2023-09-13 PROCEDURE — 2022F DILAT RTA XM EVC RTNOPTHY: CPT | Performed by: INTERNAL MEDICINE

## 2023-09-13 PROCEDURE — 1036F TOBACCO NON-USER: CPT | Performed by: INTERNAL MEDICINE

## 2023-09-13 PROCEDURE — 3044F HG A1C LEVEL LT 7.0%: CPT | Performed by: INTERNAL MEDICINE

## 2023-09-13 PROCEDURE — G8427 DOCREV CUR MEDS BY ELIG CLIN: HCPCS | Performed by: INTERNAL MEDICINE

## 2023-09-13 PROCEDURE — 3017F COLORECTAL CA SCREEN DOC REV: CPT | Performed by: INTERNAL MEDICINE

## 2023-09-13 RX ORDER — METFORMIN HYDROCHLORIDE 500 MG/1
500 TABLET, EXTENDED RELEASE ORAL
Qty: 90 TABLET | Refills: 1 | Status: SHIPPED | OUTPATIENT
Start: 2023-09-13

## 2023-09-13 RX ORDER — POTASSIUM CHLORIDE 750 MG/1
10 TABLET, EXTENDED RELEASE ORAL DAILY PRN
Qty: 90 TABLET | Refills: 0 | Status: SHIPPED | OUTPATIENT
Start: 2023-09-13

## 2023-09-13 RX ORDER — ALLOPURINOL 300 MG/1
300 TABLET ORAL DAILY
Qty: 90 TABLET | Refills: 1 | Status: SHIPPED | OUTPATIENT
Start: 2023-09-13

## 2023-09-13 RX ORDER — ATORVASTATIN CALCIUM 40 MG/1
40 TABLET, FILM COATED ORAL DAILY
Qty: 90 TABLET | Refills: 1 | Status: SHIPPED | OUTPATIENT
Start: 2023-09-13

## 2023-09-13 RX ORDER — LISINOPRIL 20 MG/1
TABLET ORAL
Qty: 90 TABLET | Refills: 1 | Status: SHIPPED | OUTPATIENT
Start: 2023-09-13

## 2023-09-13 RX ORDER — CARVEDILOL 25 MG/1
25 TABLET ORAL 2 TIMES DAILY WITH MEALS
Qty: 180 TABLET | Refills: 1 | Status: SHIPPED | OUTPATIENT
Start: 2023-09-13

## 2023-09-13 RX ORDER — FUROSEMIDE 20 MG/1
20 TABLET ORAL DAILY
Qty: 90 TABLET | Refills: 1 | Status: SHIPPED | OUTPATIENT
Start: 2023-09-13

## 2023-09-13 SDOH — ECONOMIC STABILITY: HOUSING INSECURITY
IN THE LAST 12 MONTHS, WAS THERE A TIME WHEN YOU DID NOT HAVE A STEADY PLACE TO SLEEP OR SLEPT IN A SHELTER (INCLUDING NOW)?: NO

## 2023-09-13 SDOH — ECONOMIC STABILITY: INCOME INSECURITY: HOW HARD IS IT FOR YOU TO PAY FOR THE VERY BASICS LIKE FOOD, HOUSING, MEDICAL CARE, AND HEATING?: NOT HARD AT ALL

## 2023-09-13 SDOH — ECONOMIC STABILITY: FOOD INSECURITY: WITHIN THE PAST 12 MONTHS, YOU WORRIED THAT YOUR FOOD WOULD RUN OUT BEFORE YOU GOT MONEY TO BUY MORE.: NEVER TRUE

## 2023-09-13 SDOH — ECONOMIC STABILITY: FOOD INSECURITY: WITHIN THE PAST 12 MONTHS, THE FOOD YOU BOUGHT JUST DIDN'T LAST AND YOU DIDN'T HAVE MONEY TO GET MORE.: NEVER TRUE

## 2023-09-13 ASSESSMENT — ANXIETY QUESTIONNAIRES
6. BECOMING EASILY ANNOYED OR IRRITABLE: 0
IF YOU CHECKED OFF ANY PROBLEMS ON THIS QUESTIONNAIRE, HOW DIFFICULT HAVE THESE PROBLEMS MADE IT FOR YOU TO DO YOUR WORK, TAKE CARE OF THINGS AT HOME, OR GET ALONG WITH OTHER PEOPLE: NOT DIFFICULT AT ALL
2. NOT BEING ABLE TO STOP OR CONTROL WORRYING: 0
3. WORRYING TOO MUCH ABOUT DIFFERENT THINGS: 0
GAD7 TOTAL SCORE: 0
5. BEING SO RESTLESS THAT IT IS HARD TO SIT STILL: 0
4. TROUBLE RELAXING: 0
1. FEELING NERVOUS, ANXIOUS, OR ON EDGE: 0
7. FEELING AFRAID AS IF SOMETHING AWFUL MIGHT HAPPEN: 0

## 2023-09-13 ASSESSMENT — PATIENT HEALTH QUESTIONNAIRE - PHQ9
SUM OF ALL RESPONSES TO PHQ9 QUESTIONS 1 & 2: 0
5. POOR APPETITE OR OVEREATING: 0
SUM OF ALL RESPONSES TO PHQ QUESTIONS 1-9: 0
8. MOVING OR SPEAKING SO SLOWLY THAT OTHER PEOPLE COULD HAVE NOTICED. OR THE OPPOSITE, BEING SO FIGETY OR RESTLESS THAT YOU HAVE BEEN MOVING AROUND A LOT MORE THAN USUAL: 0
SUM OF ALL RESPONSES TO PHQ QUESTIONS 1-9: 0
9. THOUGHTS THAT YOU WOULD BE BETTER OFF DEAD, OR OF HURTING YOURSELF: 0
SUM OF ALL RESPONSES TO PHQ QUESTIONS 1-9: 0
2. FEELING DOWN, DEPRESSED OR HOPELESS: 0
10. IF YOU CHECKED OFF ANY PROBLEMS, HOW DIFFICULT HAVE THESE PROBLEMS MADE IT FOR YOU TO DO YOUR WORK, TAKE CARE OF THINGS AT HOME, OR GET ALONG WITH OTHER PEOPLE: 0
6. FEELING BAD ABOUT YOURSELF - OR THAT YOU ARE A FAILURE OR HAVE LET YOURSELF OR YOUR FAMILY DOWN: 0
7. TROUBLE CONCENTRATING ON THINGS, SUCH AS READING THE NEWSPAPER OR WATCHING TELEVISION: 0
1. LITTLE INTEREST OR PLEASURE IN DOING THINGS: 0
3. TROUBLE FALLING OR STAYING ASLEEP: 0
4. FEELING TIRED OR HAVING LITTLE ENERGY: 0
SUM OF ALL RESPONSES TO PHQ QUESTIONS 1-9: 0

## 2023-09-13 ASSESSMENT — ENCOUNTER SYMPTOMS: SHORTNESS OF BREATH: 0

## 2023-09-13 NOTE — PROGRESS NOTES
Sam Tiwari M.D. Internal Medicine  [facility], [address] [place] [address]  Office : [phone]  Fax : [phone]    Chief Complaint   Patient presents with    New Patient     New pt/est care    Hypertension     Hx of CHF and Covid induced lung issues        History of Present Illness:  Silvio Peck is a 59 y.o. male. HPI    Diabetes Mellitus  Patient presents  for follow up on diabetes. Onset of symptoms was several years ago. Patient describes symptoms as none. Course to date has been well controlled. Diet and Lifestyle: follows a diabetic diet regularly  exercises sporadically  nonsmoker  Home glucose monitoring:  Results average n/a. Patient denies polyuria and polydipsia. No wounds in lower limbs. Most recent HbA1c was   Hemoglobin A1C   Date Value Ref Range Status   06/21/2023 5.9 (H) 4.8 - 5.6 % Final       Hypertension  Patient is in for Hypertension which is  elevated because he ran out of medication . Diet and Lifestyle: generally follows a low sodium diet  Home BP Monitoring: is not measured at home. Patient's recent blood pressures were   BP Readings from Last 3 Encounters:   09/13/23 (!) 163/104   09/12/23 (!) 148/97   06/10/23 (!) 170/87   . taking medications as instructed, no medication side effects noted, no TIA's, no chest pain on exertion, no dyspnea on exertion, no swelling of ankles. Cardiac risk factors consist of diabetes mellitus, dyslipidemia, hypertension, and male gender. Lab Results   Component Value Date/Time     06/21/2023 07:37 AM    K 4.5 06/21/2023 07:37 AM     06/21/2023 07:37 AM    CO2 29 06/21/2023 07:37 AM    BUN 19 06/21/2023 07:37 AM    CREATININE 1.40 06/21/2023 07:37 AM    GLUCOSE 83 06/21/2023 07:37 AM    CALCIUM 9.4 06/21/2023 07:37 AM    LABGLOM 57 06/21/2023 07:37 AM        Hyperlipidemia  Patient is in for follow-up for hyperlipidemia. Diet and Lifestyle: nonsmoker.  Risk factors

## 2023-10-11 ENCOUNTER — INITIAL CONSULT (OUTPATIENT)
Age: 63
End: 2023-10-11
Payer: MEDICARE

## 2023-10-11 VITALS
WEIGHT: 245 LBS | SYSTOLIC BLOOD PRESSURE: 148 MMHG | DIASTOLIC BLOOD PRESSURE: 94 MMHG | HEIGHT: 72 IN | HEART RATE: 60 BPM | BODY MASS INDEX: 33.18 KG/M2

## 2023-10-11 DIAGNOSIS — I42.0 DILATED CARDIOMYOPATHY (HCC): Primary | ICD-10-CM

## 2023-10-11 DIAGNOSIS — I10 ESSENTIAL HYPERTENSION: ICD-10-CM

## 2023-10-11 PROCEDURE — 3017F COLORECTAL CA SCREEN DOC REV: CPT | Performed by: INTERNAL MEDICINE

## 2023-10-11 PROCEDURE — 3077F SYST BP >= 140 MM HG: CPT | Performed by: INTERNAL MEDICINE

## 2023-10-11 PROCEDURE — 3079F DIAST BP 80-89 MM HG: CPT | Performed by: INTERNAL MEDICINE

## 2023-10-11 PROCEDURE — 1036F TOBACCO NON-USER: CPT | Performed by: INTERNAL MEDICINE

## 2023-10-11 PROCEDURE — 99214 OFFICE O/P EST MOD 30 MIN: CPT | Performed by: INTERNAL MEDICINE

## 2023-10-11 PROCEDURE — G8427 DOCREV CUR MEDS BY ELIG CLIN: HCPCS | Performed by: INTERNAL MEDICINE

## 2023-10-11 PROCEDURE — G8417 CALC BMI ABV UP PARAM F/U: HCPCS | Performed by: INTERNAL MEDICINE

## 2023-10-11 PROCEDURE — 93000 ELECTROCARDIOGRAM COMPLETE: CPT | Performed by: INTERNAL MEDICINE

## 2023-10-11 PROCEDURE — G8482 FLU IMMUNIZE ORDER/ADMIN: HCPCS | Performed by: INTERNAL MEDICINE

## 2023-10-11 ASSESSMENT — ENCOUNTER SYMPTOMS: SHORTNESS OF BREATH: 0

## 2023-10-11 NOTE — PATIENT INSTRUCTIONS
Regular moderate physical activity throughout the day with at least 30 minutes of sustained activity may lower BP up to 25%. Avoiding the salt shaker and sodium rich processed foods will lower blood pressure. Limiting alcohol will significantly lower blood pressure. Finally, research also confirms that 2-3 minutes of guided deep breathing exercises may lower BP as much as some medications. Consider downloading a free eliazar to your phone or smart watch to perform this as a part of your daily hygiene practices.

## 2023-11-08 ENCOUNTER — CLINICAL DOCUMENTATION (OUTPATIENT)
Dept: SURGERY | Age: 63
End: 2023-11-08

## 2023-11-08 ENCOUNTER — PREP FOR PROCEDURE (OUTPATIENT)
Dept: SURGERY | Age: 63
End: 2023-11-08

## 2023-11-08 DIAGNOSIS — Z12.11 COLON CANCER SCREENING: ICD-10-CM

## 2023-11-08 NOTE — PROGRESS NOTES
I have reviewed the patient's chart and consider the patient an acceptable risk for screening colonoscopy without a formal office visit. We will contact the patient to give the details of the bowel prep and to schedule screening colonoscopy in the near future. Colonoscopy: Dr Maria Fernanda Adams small sigmoid polyps--5-7 year recall  Anticoagulation: none  Family Hx: non contributory       Once the colonoscopy has been completed, the Health Maintenance will be updated accordingly.      Akua Basilio, APRN - CNP

## 2023-11-27 ENCOUNTER — OFFICE VISIT (OUTPATIENT)
Age: 63
End: 2023-11-27
Payer: MEDICARE

## 2023-11-27 ENCOUNTER — TELEPHONE (OUTPATIENT)
Age: 63
End: 2023-11-27

## 2023-11-27 VITALS
DIASTOLIC BLOOD PRESSURE: 88 MMHG | WEIGHT: 239 LBS | HEIGHT: 72 IN | BODY MASS INDEX: 32.37 KG/M2 | HEART RATE: 72 BPM | SYSTOLIC BLOOD PRESSURE: 138 MMHG

## 2023-11-27 DIAGNOSIS — E11.9 TYPE 2 DIABETES MELLITUS WITHOUT COMPLICATION, WITHOUT LONG-TERM CURRENT USE OF INSULIN (HCC): ICD-10-CM

## 2023-11-27 DIAGNOSIS — I10 ESSENTIAL HYPERTENSION: ICD-10-CM

## 2023-11-27 DIAGNOSIS — I42.0 DILATED CARDIOMYOPATHY (HCC): Primary | ICD-10-CM

## 2023-11-27 PROCEDURE — 2022F DILAT RTA XM EVC RTNOPTHY: CPT | Performed by: INTERNAL MEDICINE

## 2023-11-27 PROCEDURE — G8482 FLU IMMUNIZE ORDER/ADMIN: HCPCS | Performed by: INTERNAL MEDICINE

## 2023-11-27 PROCEDURE — 3044F HG A1C LEVEL LT 7.0%: CPT | Performed by: INTERNAL MEDICINE

## 2023-11-27 PROCEDURE — 3075F SYST BP GE 130 - 139MM HG: CPT | Performed by: INTERNAL MEDICINE

## 2023-11-27 PROCEDURE — 3079F DIAST BP 80-89 MM HG: CPT | Performed by: INTERNAL MEDICINE

## 2023-11-27 PROCEDURE — G8427 DOCREV CUR MEDS BY ELIG CLIN: HCPCS | Performed by: INTERNAL MEDICINE

## 2023-11-27 PROCEDURE — G8417 CALC BMI ABV UP PARAM F/U: HCPCS | Performed by: INTERNAL MEDICINE

## 2023-11-27 PROCEDURE — 3017F COLORECTAL CA SCREEN DOC REV: CPT | Performed by: INTERNAL MEDICINE

## 2023-11-27 PROCEDURE — 1036F TOBACCO NON-USER: CPT | Performed by: INTERNAL MEDICINE

## 2023-11-27 PROCEDURE — 99214 OFFICE O/P EST MOD 30 MIN: CPT | Performed by: INTERNAL MEDICINE

## 2023-11-27 ASSESSMENT — ENCOUNTER SYMPTOMS
SHORTNESS OF BREATH: 0
COUGH: 1

## 2023-11-27 NOTE — PATIENT INSTRUCTIONS
Things to use for sinus congestion (over the counter)  Flonase nasal spray  Saline nasal spray/irrigation/or wash  Plain Zyrtec or Claritin, plain mucinex

## 2023-11-27 NOTE — TELEPHONE ENCOUNTER
Pharmacist [facility] [address] notified, lisinopril was replaced by Entresto.  Stop lisinopril, begin Entresto on 11/29

## 2023-12-07 DIAGNOSIS — I42.0 DILATED CARDIOMYOPATHY (HCC): ICD-10-CM

## 2023-12-07 LAB
ANION GAP SERPL CALC-SCNC: 8 MMOL/L (ref 2–11)
BUN SERPL-MCNC: 19 MG/DL (ref 8–23)
CALCIUM SERPL-MCNC: 9.3 MG/DL (ref 8.3–10.4)
CHLORIDE SERPL-SCNC: 108 MMOL/L (ref 103–113)
CO2 SERPL-SCNC: 26 MMOL/L (ref 21–32)
CREAT SERPL-MCNC: 1.4 MG/DL (ref 0.8–1.5)
GLUCOSE SERPL-MCNC: 96 MG/DL (ref 65–100)
POTASSIUM SERPL-SCNC: 3.9 MMOL/L (ref 3.5–5.1)
SODIUM SERPL-SCNC: 142 MMOL/L (ref 136–146)

## 2023-12-08 PROBLEM — Z12.11 COLON CANCER SCREENING: Status: RESOLVED | Noted: 2023-11-08 | Resolved: 2023-12-08

## 2023-12-11 ENCOUNTER — NURSE ONLY (OUTPATIENT)
Age: 63
End: 2023-12-11

## 2023-12-11 VITALS — HEART RATE: 76 BPM | DIASTOLIC BLOOD PRESSURE: 76 MMHG | SYSTOLIC BLOOD PRESSURE: 116 MMHG

## 2023-12-11 NOTE — TELEPHONE ENCOUNTER
Requested Prescriptions     Pending Prescriptions Disp Refills    sacubitril-valsartan (ENTRESTO)  MG per tablet 60 tablet 0     Sig: Take 1 tablet by mouth 2 times daily        Pharmacy confirmed. Per nurse visit.

## 2023-12-11 NOTE — PROGRESS NOTES
Pt came in for nurse visit for Entresto titration per Dr. Ashtyn Palumbo. Pt has been taking Entresto 49-51 mg BID and has stopped Lisinopril. Pt's BP today was 112/70 and 116/76, HR 76. Pt states he has been feeling well and feels like his energy levels have been better. States he has been keeping up with BP and it has been good. Titration Entresto up to  mg BID per protocol. Forwarding to Dr. Ashtyn Palumbo for review. Scheduled nurse visit for pt to come back in 3 weeks. Pt informed to call with any problems between now and then. Pt voiced understanding.

## 2023-12-18 RX ORDER — SODIUM CHLORIDE 9 MG/ML
INJECTION, SOLUTION INTRAVENOUS PRN
OUTPATIENT
Start: 2023-12-18

## 2023-12-18 RX ORDER — SODIUM CHLORIDE 0.9 % (FLUSH) 0.9 %
5-40 SYRINGE (ML) INJECTION EVERY 12 HOURS SCHEDULED
OUTPATIENT
Start: 2023-12-18

## 2023-12-18 RX ORDER — SODIUM CHLORIDE 0.9 % (FLUSH) 0.9 %
5-40 SYRINGE (ML) INJECTION PRN
OUTPATIENT
Start: 2023-12-18

## 2024-01-08 ENCOUNTER — NURSE ONLY (OUTPATIENT)
Age: 64
End: 2024-01-08

## 2024-01-08 VITALS
HEIGHT: 72 IN | BODY MASS INDEX: 32.41 KG/M2 | SYSTOLIC BLOOD PRESSURE: 138 MMHG | DIASTOLIC BLOOD PRESSURE: 88 MMHG | HEART RATE: 76 BPM

## 2024-01-08 NOTE — PROGRESS NOTES
Patient in office to follow up on Entresto titration. Patient's Entresto titrated up to  on 12/11/23. Patient reports that since starting the new dose of Entresto he has noticed being sleepy after taking it. States he has been napping more than normal. Current BP is 138 88 p 76.     Spoke with Dr. Lind who stated if patient was feeling better on lower dose of Entresto she recommends going back to Entresto 49-51 and let us know if he continues to be extra tired. Patient voiced understanding.

## 2024-01-08 NOTE — TELEPHONE ENCOUNTER
Requested Prescriptions     Pending Prescriptions Disp Refills    sacubitril-valsartan (ENTRESTO) 49-51 MG per tablet 60 tablet 3     Sig: Take 1 tablet by mouth 2 times daily

## 2024-01-12 NOTE — Clinical Note
"Phoned patient. Reviewed history and explained VASYL process.     VASYL Patient- Surgeon- WINIFRED  Surgery- GBP DUE TO OTHER MD RECOMMENDATION  Completed Online Seminar- 1/9    PCP: MARIELLE URIBE    BMI: 64.1 HT: 5"7 WT:406    PMH:  HTN CONTOLLED W/ AMLODIPINE   LOW THY. HAD NODULES HAD PART THY.7/8 YRS AGO. TAKES SYNTHYROID  BIPAP FOR SA  HD 4 STENTS TO RCA TAKES PLAVIX ASA AND A STATIN    COVID Y NO HOSPITAL  Vaccinated X1     GERD ?     PPI N    Typical heartburn N   Regurgitation N   Dysphagia solids N   Dysphagia liquids N   Hoarseness N   Sore throat N W/ COLDS   Cough N   Asthma N   Chest pain STENTS ON RENEXIA   Water brash N   Globus N   Nausea N   Vomiting N    If yes to dysphagia, ask the eckard scoring system:    Smoker- any form of Nicotine or Marijuana NONE    Take a flight of stairs without SOB- CAN NO SOB  Can amb W/O ASST. Y      ROS:  Fever/infection: N  Dental: N  Sudden vision change: N  Cough/resp problems: N  BM:REG  Abn BLEEDING N  UTI: N  Recent steroid tx: N  Stroke: N  Seizure: N  Blood thinners: PLAVIX AND ASA  Blood clot: N  Psych hx: N    Past Surgeries:  TONSILS AS KID  PART THY.       Anes PROB. N    Fam hx:  Anes PROB N  Heart problems: MOM HTN  Clots: N    Reviewed meds, allergies, and preferred pharmacy in [facility].    Works at [employer] as   Lives with WIFE  C/G MONIKA CASTRO    Will route chart to MD's.   PT on BIPAP pt will get cards clearance, pt needs cxr and labs. PCP clearance is in [facility].1/3/24 encounter.           " Right radial artery. Accessed successfully.

## 2024-01-15 PROBLEM — Z12.11 COLON CANCER SCREENING: Status: ACTIVE | Noted: 2023-11-08

## 2024-01-15 NOTE — PERIOP NOTE
Patient verified name, , and procedure.    Type: 1a; abbreviated assessment per anesthesia guidelines    Labs per anesthesia: SQBS s/h for DOS    Chart placed for anesthesia review:  EF 42%    Instructed pt that they will be notified the day before their procedure by the GI Lab for time of arrival if their procedure is [place] and Pre-op for [place] cases. Arrival times should be called by 5 pm. If no phone is received the patient should contact their respective hospital. The GI lab telephone number is [phone] and ES Pre-op is [phone].     Follow diet and prep instructions per office including NPO status.  If patient has NOT received instructions from office patient is advised to call surgeon office, verbalizes understanding.    Bath or shower the night before and the am of surgery with non-moisturizing soap. No lotions, oils, powders, cologne on skin. No make up, eye make up or jewelry. Wear loose fitting comfortable, clean clothing.     Must have adult present in building the entire time .     Medications for the day of procedure aspirin 81 mg, atorvastatin, carvedilol, Entresto, allopurinol,   Patient to hold NSAIDS- naproxen, vitamins and supplements now for surgery per anesthesia guidelines.     The following discharge instructions reviewed with patient: medication given during procedure may cause drowsiness for several hours, therefore, do not drive or operate machinery for remainder of the day. You may not drink alcohol on the day of your procedure, please resume regular diet and activity unless otherwise directed. You may experience abdominal distention for several hours that is relieved by the passage of gas. Contact your physician if you have any of the following: fever or chills, severe abdominal pain or excessive amount of bleeding or a large amount when having a bowel movement. Occasional specks of blood with bowel movement would not be unusual.

## 2024-01-16 ENCOUNTER — OFFICE VISIT (OUTPATIENT)
Dept: INTERNAL MEDICINE CLINIC | Facility: CLINIC | Age: 64
End: 2024-01-16
Payer: MEDICARE

## 2024-01-16 VITALS
WEIGHT: 248.8 LBS | SYSTOLIC BLOOD PRESSURE: 155 MMHG | DIASTOLIC BLOOD PRESSURE: 100 MMHG | TEMPERATURE: 97.1 F | HEART RATE: 60 BPM | OXYGEN SATURATION: 95 % | BODY MASS INDEX: 33.7 KG/M2 | HEIGHT: 72 IN

## 2024-01-16 DIAGNOSIS — E11.9 TYPE 2 DIABETES MELLITUS WITHOUT COMPLICATION, WITHOUT LONG-TERM CURRENT USE OF INSULIN (HCC): ICD-10-CM

## 2024-01-16 DIAGNOSIS — I10 ESSENTIAL (PRIMARY) HYPERTENSION: ICD-10-CM

## 2024-01-16 DIAGNOSIS — N18.30 STAGE 3 CHRONIC KIDNEY DISEASE, UNSPECIFIED WHETHER STAGE 3A OR 3B CKD (HCC): ICD-10-CM

## 2024-01-16 DIAGNOSIS — I42.0 DILATED CARDIOMYOPATHY (HCC): ICD-10-CM

## 2024-01-16 DIAGNOSIS — R56.9 CONVULSIONS, UNSPECIFIED CONVULSION TYPE (HCC): ICD-10-CM

## 2024-01-16 DIAGNOSIS — E78.2 MIXED HYPERLIPIDEMIA: ICD-10-CM

## 2024-01-16 DIAGNOSIS — I50.22 CHRONIC SYSTOLIC (CONGESTIVE) HEART FAILURE (HCC): ICD-10-CM

## 2024-01-16 DIAGNOSIS — R41.3 MEMORY LOSS: Primary | ICD-10-CM

## 2024-01-16 DIAGNOSIS — R41.3 MEMORY LOSS: ICD-10-CM

## 2024-01-16 DIAGNOSIS — M10.9 ACUTE GOUT OF RIGHT HAND, UNSPECIFIED CAUSE: ICD-10-CM

## 2024-01-16 LAB
ANION GAP SERPL CALC-SCNC: 5 MMOL/L (ref 2–11)
BUN SERPL-MCNC: 15 MG/DL (ref 8–23)
CALCIUM SERPL-MCNC: 9.1 MG/DL (ref 8.3–10.4)
CHLORIDE SERPL-SCNC: 108 MMOL/L (ref 103–113)
CO2 SERPL-SCNC: 27 MMOL/L (ref 21–32)
CREAT SERPL-MCNC: 1.4 MG/DL (ref 0.8–1.5)
GLUCOSE SERPL-MCNC: 122 MG/DL (ref 65–100)
POTASSIUM SERPL-SCNC: 3.5 MMOL/L (ref 3.5–5.1)
SODIUM SERPL-SCNC: 140 MMOL/L (ref 136–146)

## 2024-01-16 PROCEDURE — 99214 OFFICE O/P EST MOD 30 MIN: CPT | Performed by: INTERNAL MEDICINE

## 2024-01-16 PROCEDURE — 3046F HEMOGLOBIN A1C LEVEL >9.0%: CPT | Performed by: INTERNAL MEDICINE

## 2024-01-16 PROCEDURE — 3077F SYST BP >= 140 MM HG: CPT | Performed by: INTERNAL MEDICINE

## 2024-01-16 PROCEDURE — 2022F DILAT RTA XM EVC RTNOPTHY: CPT | Performed by: INTERNAL MEDICINE

## 2024-01-16 PROCEDURE — 3080F DIAST BP >= 90 MM HG: CPT | Performed by: INTERNAL MEDICINE

## 2024-01-16 PROCEDURE — G8482 FLU IMMUNIZE ORDER/ADMIN: HCPCS | Performed by: INTERNAL MEDICINE

## 2024-01-16 PROCEDURE — G8427 DOCREV CUR MEDS BY ELIG CLIN: HCPCS | Performed by: INTERNAL MEDICINE

## 2024-01-16 PROCEDURE — 3017F COLORECTAL CA SCREEN DOC REV: CPT | Performed by: INTERNAL MEDICINE

## 2024-01-16 PROCEDURE — G8417 CALC BMI ABV UP PARAM F/U: HCPCS | Performed by: INTERNAL MEDICINE

## 2024-01-16 PROCEDURE — 1036F TOBACCO NON-USER: CPT | Performed by: INTERNAL MEDICINE

## 2024-01-16 ASSESSMENT — PATIENT HEALTH QUESTIONNAIRE - PHQ9
7. TROUBLE CONCENTRATING ON THINGS, SUCH AS READING THE NEWSPAPER OR WATCHING TELEVISION: 0
10. IF YOU CHECKED OFF ANY PROBLEMS, HOW DIFFICULT HAVE THESE PROBLEMS MADE IT FOR YOU TO DO YOUR WORK, TAKE CARE OF THINGS AT HOME, OR GET ALONG WITH OTHER PEOPLE: 0
8. MOVING OR SPEAKING SO SLOWLY THAT OTHER PEOPLE COULD HAVE NOTICED. OR THE OPPOSITE, BEING SO FIGETY OR RESTLESS THAT YOU HAVE BEEN MOVING AROUND A LOT MORE THAN USUAL: 0
5. POOR APPETITE OR OVEREATING: 0
2. FEELING DOWN, DEPRESSED OR HOPELESS: 0
9. THOUGHTS THAT YOU WOULD BE BETTER OFF DEAD, OR OF HURTING YOURSELF: 0
SUM OF ALL RESPONSES TO PHQ QUESTIONS 1-9: 0
4. FEELING TIRED OR HAVING LITTLE ENERGY: 0
SUM OF ALL RESPONSES TO PHQ QUESTIONS 1-9: 0
1. LITTLE INTEREST OR PLEASURE IN DOING THINGS: 0
SUM OF ALL RESPONSES TO PHQ9 QUESTIONS 1 & 2: 0
6. FEELING BAD ABOUT YOURSELF - OR THAT YOU ARE A FAILURE OR HAVE LET YOURSELF OR YOUR FAMILY DOWN: 0
SUM OF ALL RESPONSES TO PHQ QUESTIONS 1-9: 0
SUM OF ALL RESPONSES TO PHQ QUESTIONS 1-9: 0
3. TROUBLE FALLING OR STAYING ASLEEP: 0

## 2024-01-16 ASSESSMENT — ENCOUNTER SYMPTOMS: SHORTNESS OF BREATH: 0

## 2024-01-16 NOTE — PROGRESS NOTES
[facility]  Kolby Bowdne M.D.  Internal Medicine  [address] [place] [id number]  Office : [phone]  Fax : [phone]    Chief Complaint   Patient presents with    Diabetes    Other     Patient has colonoscopy tomorrow. Last took BP meds last night        History of Present Illness:  Smooth Borrero is a 63 y.o. male.  HPI    Diabetes Mellitus  Patient presents  for follow up on diabetes.  Onset of symptoms was several years ago. Patient describes symptoms as none. Course to date has been well controlled.Diet and Lifestyle: follows a diabetic diet regularly  exercises sporadically  nonsmoker  Home glucose monitoring:  Results average n/a. Patient denies polyuria and polydipsia. No wounds in lower limbs.  Most recent HbA1c was   Hemoglobin A1C   Date Value Ref Range Status   06/21/2023 5.9 (H) 4.8 - 5.6 % Final       Hypertension  Patient is in for Hypertension which is  elevated today because he did not yet take his AM meds .    Diet and Lifestyle: generally follows a low sodium diet  Home BP Monitoring: is borderline controlled at home, ranging 145's/85's. Patient's recent blood pressures were   BP Readings from Last 3 Encounters:   01/16/24 (!) 155/100   01/08/24 138/88   12/11/23 116/76   .   taking medications as instructed, no medication side effects noted, no TIA's, no chest pain on exertion, no dyspnea on exertion, no swelling of ankles. Cardiac risk factors consist of diabetes mellitus, dyslipidemia, hypertension, and male gender.  Lab Results   Component Value Date/Time     12/07/2023 09:08 AM    K 3.9 12/07/2023 09:08 AM     12/07/2023 09:08 AM    CO2 26 12/07/2023 09:08 AM    BUN 19 12/07/2023 09:08 AM    CREATININE 1.40 12/07/2023 09:08 AM    GLUCOSE 96 12/07/2023 09:08 AM    CALCIUM 9.3 12/07/2023 09:08 AM    LABGLOM 57 12/07/2023 09:08 AM        Hyperlipidemia  Patient is in for follow-up for hyperlipidemia.  Diet and

## 2024-01-16 NOTE — PROGRESS NOTES
Confirmed scheduled procedure with patient. Informed patient of time of arrival (0815), entry location ([address] [place] [address] ), and  policy. Opportunity for questions provided. No concerns voiced at this time.

## 2024-01-17 ENCOUNTER — ANESTHESIA (OUTPATIENT)
Dept: ENDOSCOPY | Age: 64
End: 2024-01-17
Payer: MEDICARE

## 2024-01-17 ENCOUNTER — HOSPITAL ENCOUNTER (OUTPATIENT)
Age: 64
Setting detail: OUTPATIENT SURGERY
Discharge: HOME | End: 2024-01-17
Attending: SURGERY | Admitting: SURGERY
Payer: MEDICARE

## 2024-01-17 ENCOUNTER — ANESTHESIA EVENT (OUTPATIENT)
Dept: ENDOSCOPY | Age: 64
End: 2024-01-17
Payer: MEDICARE

## 2024-01-17 VITALS
RESPIRATION RATE: 20 BRPM | HEART RATE: 61 BPM | WEIGHT: 235 LBS | SYSTOLIC BLOOD PRESSURE: 169 MMHG | HEIGHT: 72 IN | OXYGEN SATURATION: 96 % | DIASTOLIC BLOOD PRESSURE: 107 MMHG | TEMPERATURE: 97.7 F | BODY MASS INDEX: 31.83 KG/M2

## 2024-01-17 LAB
EST. AVERAGE GLUCOSE BLD GHB EST-MCNC: 126 MG/DL
GLUCOSE BLD STRIP.AUTO-MCNC: 111 MG/DL (ref 65–100)
HBA1C MFR BLD: 6 % (ref 4.8–5.6)
SERVICE CMNT-IMP: ABNORMAL
VIT B12 SERPL-MCNC: 443 PG/ML (ref 193–986)

## 2024-01-17 PROCEDURE — 2580000003 HC RX 258: Performed by: NURSE ANESTHETIST, CERTIFIED REGISTERED

## 2024-01-17 PROCEDURE — 7100000010 HC PHASE II RECOVERY - FIRST 15 MIN: Performed by: SURGERY

## 2024-01-17 PROCEDURE — 82962 GLUCOSE BLOOD TEST: CPT

## 2024-01-17 PROCEDURE — 3700000001 HC ADD 15 MINUTES (ANESTHESIA): Performed by: SURGERY

## 2024-01-17 PROCEDURE — 7100000011 HC PHASE II RECOVERY - ADDTL 15 MIN: Performed by: SURGERY

## 2024-01-17 PROCEDURE — 2500000003 HC RX 250 WO HCPCS: Performed by: NURSE ANESTHETIST, CERTIFIED REGISTERED

## 2024-01-17 PROCEDURE — 2709999900 HC NON-CHARGEABLE SUPPLY: Performed by: SURGERY

## 2024-01-17 PROCEDURE — 3700000000 HC ANESTHESIA ATTENDED CARE: Performed by: SURGERY

## 2024-01-17 PROCEDURE — 6360000002 HC RX W HCPCS: Performed by: NURSE ANESTHETIST, CERTIFIED REGISTERED

## 2024-01-17 PROCEDURE — 3609027000 HC COLONOSCOPY: Performed by: SURGERY

## 2024-01-17 RX ORDER — PROPOFOL 10 MG/ML
INJECTION, EMULSION INTRAVENOUS PRN
Status: DISCONTINUED | OUTPATIENT
Start: 2024-01-17 | End: 2024-01-17 | Stop reason: SDUPTHER

## 2024-01-17 RX ORDER — LABETALOL HYDROCHLORIDE 5 MG/ML
INJECTION, SOLUTION INTRAVENOUS PRN
Status: DISCONTINUED | OUTPATIENT
Start: 2024-01-17 | End: 2024-01-17 | Stop reason: SDUPTHER

## 2024-01-17 RX ORDER — SODIUM CHLORIDE, SODIUM LACTATE, POTASSIUM CHLORIDE, CALCIUM CHLORIDE 600; 310; 30; 20 MG/100ML; MG/100ML; MG/100ML; MG/100ML
INJECTION, SOLUTION INTRAVENOUS CONTINUOUS PRN
Status: DISCONTINUED | OUTPATIENT
Start: 2024-01-17 | End: 2024-01-17 | Stop reason: SDUPTHER

## 2024-01-17 RX ORDER — LIDOCAINE HYDROCHLORIDE 20 MG/ML
INJECTION, SOLUTION EPIDURAL; INFILTRATION; INTRACAUDAL; PERINEURAL PRN
Status: DISCONTINUED | OUTPATIENT
Start: 2024-01-17 | End: 2024-01-17 | Stop reason: SDUPTHER

## 2024-01-17 RX ORDER — SODIUM CHLORIDE 0.9 % (FLUSH) 0.9 %
5-40 SYRINGE (ML) INJECTION PRN
Status: DISCONTINUED | OUTPATIENT
Start: 2024-01-17 | End: 2024-01-17 | Stop reason: HOSPADM

## 2024-01-17 RX ORDER — SODIUM CHLORIDE 0.9 % (FLUSH) 0.9 %
5-40 SYRINGE (ML) INJECTION EVERY 12 HOURS SCHEDULED
Status: DISCONTINUED | OUTPATIENT
Start: 2024-01-17 | End: 2024-01-17 | Stop reason: HOSPADM

## 2024-01-17 RX ORDER — SODIUM CHLORIDE 9 MG/ML
INJECTION, SOLUTION INTRAVENOUS PRN
Status: DISCONTINUED | OUTPATIENT
Start: 2024-01-17 | End: 2024-01-17 | Stop reason: HOSPADM

## 2024-01-17 RX ADMIN — LIDOCAINE HYDROCHLORIDE 60 MG: 20 INJECTION, SOLUTION EPIDURAL; INFILTRATION; INTRACAUDAL; PERINEURAL at 10:43

## 2024-01-17 RX ADMIN — PROPOFOL 100 MCG/KG/MIN: 10 INJECTION, EMULSION INTRAVENOUS at 10:44

## 2024-01-17 RX ADMIN — SODIUM CHLORIDE, SODIUM LACTATE, POTASSIUM CHLORIDE, AND CALCIUM CHLORIDE: 600; 310; 30; 20 INJECTION, SOLUTION INTRAVENOUS at 10:39

## 2024-01-17 RX ADMIN — PROPOFOL 50 MG: 10 INJECTION, EMULSION INTRAVENOUS at 10:43

## 2024-01-17 RX ADMIN — LABETALOL HYDROCHLORIDE 5 MG: 5 INJECTION INTRAVENOUS at 10:46

## 2024-01-17 ASSESSMENT — PAIN - FUNCTIONAL ASSESSMENT: PAIN_FUNCTIONAL_ASSESSMENT: 0-10

## 2024-01-17 ASSESSMENT — LIFESTYLE VARIABLES: SMOKING_STATUS: 0

## 2024-01-17 NOTE — H&P
[facility]  [place], [place] [address] [phone]     History and Physical/Surgical Consult   Smooth Borrero    Admit date: 2024    MRN: [id number]     : 1960     Age: 63 y.o.          2024 9:34 AM    Subjective/HPI:   This patient is a 63 y.o. here today for colonoscopy.  Last one was 6 to 7 years ago and was normal.  No blood in his stool.  No change in his bowel habits.  No family history of colon cancer.    Review of Systems  Pertinent items are noted in HPI.  Past Medical History:   Diagnosis Date    Arthritis     CHF (congestive heart failure) ([facility]) 2014    with acute/chronic systolic CHF in face of malignant hypertension and polysubstance abuse to include alcohol, cocaine    CHF (congestive heart failure) ([facility]) 2015    echo: LVEF 50-55%, mild to mod .MR    Chronic kidney disease     stage III per cardiology ov note 2/25/15 (cr. 1.5)    COVID-19 virus infection 2021    Diabetes mellitus (HCC)     metformin, A1c 23 5.9    Dilated cardiomyopathy (HCC)     Diverticulosis of large intestine without diverticulitis 2016    Gout     H/O degenerative disc disease     History of alcohol abuse 2014    History of drug abuse (HCC) 2014    History of echocardiogram 2023    EF 42%, AV mild regurgitation, MV mild to moderated regurgitaton    History of MI (myocardial infarction) 2021   ? Covid related      Hx of colonic polyps 2016    Hyperlipidemia     Hypertension     medication    IFG (impaired fasting glucose) 2019    Persistent disorder of initiating or maintaining sleep 2019    Reactive depression 10/12/2021    Seizures ([facility])     no seizures since , off Keppra    Sleep apnea     noncompliant with CPAP      Past Surgical History:   Procedure Laterality Date    CARDIAC CATHETERIZATION  2021    COLONOSCOPY  last 16    [facility]--five small sigmoid polyps--5-7 year recall

## 2024-01-17 NOTE — DISCHARGE INSTRUCTIONS
Gastrointestinal Colonoscopy/Flexible Sigmoidoscopy - Lower Exam Discharge Instructions  Call Dr. Ruiz at [phone] for any problems or questions.  Contact the doctor’s office for follow up appointment as directed  Medication may cause drowsiness for several hours, therefore:  Do not drive or operate machinery for reminder of the day.  No alcohol today.  Do not make any important or legal decisions for 24 hours.  Do not sign any legal documents for 24 hours.  Ordinarily, you may resume regular diet and activity after exam unless otherwise specified by your physician.  Because of air put into your colon during exam, you may experience some abdominal distension, relieved by the passage of gas, for several hours.  Contact your physician if you have any of the following:  Excessive amount of bleeding - large amount when having a bowel movement.  Occasional specks of blood with bowel movement would not be unusual.  Severe abdominal pain  Fever or Chills    Any additional instructions:  Repeat colonoscopy in 5 years

## 2024-01-17 NOTE — OP NOTE
[facility]  OPERATIVE REPORT    Name:  AMARI OSBORNE  MR#:  [id number]  :  1960  ACCOUNT #:  [id number]  DATE OF SERVICE:  2024    PREOPERATIVE DIAGNOSIS:  Screening colonoscopy.    POSTOPERATIVE DIAGNOSIS:  Pan diverticula.    PROCEDURE PERFORMED:  Colonoscopy.    SURGEON:  Nemesio Coffey Jr, MD    ASSISTANT:  None.    ANESTHESIA:  MAC.    COMPLICATIONS:  None.    SPECIMENS REMOVED:  None.    IMPLANTS:  None.    ESTIMATED BLOOD LOSS:  Minimal.    PROCEDURE:  After the patient was brought into room, time-out was carried out and all were in agreement.  He was rolled onto his left side.  MAC anesthesia administered.  Scope inserted into the rectum.  Rectal exam normal.  The patient had a fair prep.  He did have a thick stool, but no solid stool throughout the colon.   Scope passed to level of cecum without difficulty and under direct visualization.  Slow withdrawal was performed.  Cecum normal.  Ascending, transverse, and descending colon all had diverticula scattered.  Sigmoid con, scattered diverticula.  Rectum normal.  As much air as possible removed prior to removing the scope.  The patient tolerated the procedure well.  We will recommend him having another colonoscopy in five years.      NEMESIO COFFEY JR, MD      [id number]  D:  2024 11:10  T:  2024 11:29  JOB #:  [id number]

## 2024-01-17 NOTE — BRIEF OP NOTE
Brief Postoperative Note      Patient: Smooth Borrero  YOB: 1960  MRN: [id number]    Date of Procedure: 1/17/2024    Pre-Op Diagnosis Codes:     * Colon cancer screening [Z12.11]    Post-Op Diagnosis: Pan diverticuli       Procedure(s):  COLORECTAL CANCER SCREENING, NOT HIGH RISK    Surgeon(s):  Nemesio Coffey Jr., MD    Assistant:  * No surgical staff found *    Anesthesia: Monitor Anesthesia Care    Estimated Blood Loss (mL): Minimal    Complications: None    Specimens:   * No specimens in log *    Implants:  * No implants in log *      Drains: * No LDAs found *    Findings: As above.  Recommend repeating colonoscopy in 5 years.      Electronically signed by NEMESIO COFFEY JR, MD on 1/17/2024 at 11:06 AM

## 2024-01-17 NOTE — ANESTHESIA POSTPROCEDURE EVALUATION
Department of Anesthesiology  Postprocedure Note    Patient: Smooth Borrero  MRN: [id number]  YOB: 1960  Date of evaluation: 1/17/2024    Procedure Summary     Date: 01/17/24 Room / Location: [facility] ENDO 03 / [facility] ENDOSCOPY    Anesthesia Start: 1039 Anesthesia Stop: 1112    Procedure: COLORECTAL CANCER SCREENING, NOT HIGH RISK Diagnosis:       Colon cancer screening      (Colon cancer screening [Z12.11])    Surgeons: Nemesio Ruiz Jr., MD Responsible Provider: Ish Parish MD    Anesthesia Type: TIVA ASA Status: 3          Anesthesia Type: TIVA    Deonte Phase I: Deonte Score: 10    Deonte Phase II: Deonte Score: 10    Anesthesia Post Evaluation    Patient location during evaluation: PACU  Patient participation: complete - patient participated  Level of consciousness: awake and alert  Airway patency: patent  Nausea & Vomiting: no nausea and no vomiting  Cardiovascular status: hemodynamically stable  Respiratory status: acceptable, nonlabored ventilation and spontaneous ventilation  Hydration status: euvolemic  Comments: BP (!) 169/107   Pulse 61   Temp 97.7 °F (36.5 °C) (Skin)   Resp 20   Ht 1.829 m (6')   Wt 106.6 kg (235 lb)   SpO2 96%   BMI 31.87 kg/m²     Multimodal analgesia pain management approach  Pain management: adequate and satisfactory to patient        No notable events documented.

## 2024-01-17 NOTE — OP NOTE
Operative Note      Patient: Smooth Borrero  YOB: 1960  MRN: [id number]    Date of Procedure: 1/17/2024    Pre-Op Diagnosis Codes:     * Colon cancer screening [Z12.11]    Post-Op Diagnosis:  Pan diverticuli       Procedure(s):  COLORECTAL CANCER SCREENING, NOT HIGH RISK    Surgeon(s):  Nemesio Coffey Jr., MD    Assistant:   * No surgical staff found *    Anesthesia: Monitor Anesthesia Care    Estimated Blood Loss (mL): Minimal    Complications: None    Specimens:   * No specimens in log *    Implants:  * No implants in log *      Drains: * No LDAs found *    Findings: As above.  Recommend repeating colonoscopy in 5 years.        Detailed Description of Procedure:   Dictated   Job#[id number]    Electronically signed by NEMESIO COFFEY JR, MD on 1/17/2024 at 11:07 AM

## 2024-01-17 NOTE — ANESTHESIA PRE PROCEDURE
Department of Anesthesiology  Preprocedure Note       Name:  Smooth Borrero   Age:  63 y.o.  :  1960                                          MRN:  [id number]         Date:  2024      Surgeon: Surgeon(s):  Nemesio Ruiz Jr., MD    Procedure: Procedure(s):  COLORECTAL CANCER SCREENING, NOT HIGH RISK    Medications prior to admission:   Prior to Admission medications    Medication Sig Start Date End Date Taking? Authorizing Provider   Multiple Vitamin (MULTIVITAMIN ADULT PO) Take by mouth Daily Pack of vitamins- daily   Yes ProviderAugustus MD   VITAMIN E PO Take by mouth daily   Yes Provider, MD Augustus   CRANBERRY PO Take by mouth daily   Yes ProviderAugustus MD   sacubitril-valsartan (ENTRESTO) 49-51 MG per tablet Take 1 tablet by mouth 2 times daily 24   Judy Robbins MD   allopurinol (ZYLOPRIM) 300 MG tablet Take 1 tablet by mouth daily 23   Kolby Bowden MD   furosemide (LASIX) 20 MG tablet Take 1 tablet by mouth daily 23   Kolby Bowden MD   potassium chloride (KLOR-CON M) 10 MEQ extended release tablet Take 1 tablet by mouth daily as needed (SOB) 23   Kolby Bowden MD   metFORMIN (GLUCOPHAGE-XR) 500 MG extended release tablet Take 1 tablet by mouth Daily with supper 23   Kolby Bowden MD   carvedilol (COREG) 25 MG tablet Take 1 tablet by mouth 2 times daily (with meals) 23   Kolby Bowden MD   atorvastatin (LIPITOR) 40 MG tablet Take 1 tablet by mouth daily 23   Kolby Bowden MD   acetaminophen (TYLENOL) 325 MG tablet Take 2 tablets (650 mg) by mouth every 6 (six) hours as needed for mild pain, moderate pain, headaches or fever 21   Automatic Reconciliation, Ar   aspirin 81 MG EC tablet Take 1 tablet by mouth every 7 days    Automatic Reconciliation, Ar   Cholecalciferol 50 MCG (2000 UT) TABS Take 1 tablet (2,000 Units) by mouth daily  Patient not taking: Reported on 2024   Automatic

## 2024-01-18 NOTE — PROGRESS NOTES
Spiritual Care visit. Initial Visit, Pre Surgery Consult.    Visit and prayer with patient's wife after patient went to surgery.    Visit by Homero Rosario M.Ed., Th.B. ,Staff

## 2024-02-16 PROBLEM — Z12.11 COLON CANCER SCREENING: Status: RESOLVED | Noted: 2023-11-08 | Resolved: 2024-02-16

## 2024-03-01 DIAGNOSIS — I42.0 DILATED CARDIOMYOPATHY (HCC): ICD-10-CM

## 2024-03-01 DIAGNOSIS — M10.9 ACUTE GOUT OF RIGHT HAND, UNSPECIFIED CAUSE: ICD-10-CM

## 2024-03-01 DIAGNOSIS — E11.9 TYPE 2 DIABETES MELLITUS WITHOUT COMPLICATION, WITHOUT LONG-TERM CURRENT USE OF INSULIN (HCC): ICD-10-CM

## 2024-03-01 DIAGNOSIS — E78.2 MIXED HYPERLIPIDEMIA: ICD-10-CM

## 2024-03-01 NOTE — TELEPHONE ENCOUNTER
Pt was last seen in January and has a upcoming appointment in may pt is needing refills on the pend medications which was sent in September for 90/1

## 2024-03-04 ENCOUNTER — APPOINTMENT (OUTPATIENT)
Dept: GENERAL RADIOLOGY | Age: 64
End: 2024-03-04
Payer: MEDICARE

## 2024-03-04 ENCOUNTER — HOSPITAL ENCOUNTER (EMERGENCY)
Age: 64
Discharge: HOME | End: 2024-03-05
Attending: EMERGENCY MEDICINE
Payer: MEDICARE

## 2024-03-04 DIAGNOSIS — M19.031 ARTHRITIS OF RIGHT WRIST: Primary | ICD-10-CM

## 2024-03-04 PROCEDURE — 96372 THER/PROPH/DIAG INJ SC/IM: CPT

## 2024-03-04 PROCEDURE — 6360000002 HC RX W HCPCS: Performed by: EMERGENCY MEDICINE

## 2024-03-04 PROCEDURE — 99284 EMERGENCY DEPT VISIT MOD MDM: CPT

## 2024-03-04 PROCEDURE — 73110 X-RAY EXAM OF WRIST: CPT

## 2024-03-04 PROCEDURE — 6370000000 HC RX 637 (ALT 250 FOR IP): Performed by: EMERGENCY MEDICINE

## 2024-03-04 RX ORDER — HYDROCODONE BITARTRATE AND ACETAMINOPHEN 5; 325 MG/1; MG/1
1 TABLET ORAL
Status: COMPLETED | OUTPATIENT
Start: 2024-03-04 | End: 2024-03-04

## 2024-03-04 RX ORDER — METFORMIN HYDROCHLORIDE 500 MG/1
TABLET, EXTENDED RELEASE ORAL
Qty: 90 TABLET | Refills: 0 | Status: SHIPPED | OUTPATIENT
Start: 2024-03-04

## 2024-03-04 RX ORDER — ALLOPURINOL 300 MG/1
300 TABLET ORAL DAILY
Qty: 90 TABLET | Refills: 0 | Status: SHIPPED | OUTPATIENT
Start: 2024-03-04

## 2024-03-04 RX ORDER — ATORVASTATIN CALCIUM 40 MG/1
40 TABLET, FILM COATED ORAL DAILY
Qty: 90 TABLET | Refills: 0 | Status: SHIPPED | OUTPATIENT
Start: 2024-03-04

## 2024-03-04 RX ORDER — CARVEDILOL 25 MG/1
25 TABLET ORAL 2 TIMES DAILY WITH MEALS
Qty: 180 TABLET | Refills: 0 | Status: SHIPPED | OUTPATIENT
Start: 2024-03-04

## 2024-03-04 RX ORDER — DEXAMETHASONE SODIUM PHOSPHATE 10 MG/ML
10 INJECTION INTRAMUSCULAR; INTRAVENOUS ONCE
Status: COMPLETED | OUTPATIENT
Start: 2024-03-04 | End: 2024-03-04

## 2024-03-04 RX ORDER — KETOROLAC TROMETHAMINE 30 MG/ML
30 INJECTION, SOLUTION INTRAMUSCULAR; INTRAVENOUS
Status: COMPLETED | OUTPATIENT
Start: 2024-03-04 | End: 2024-03-04

## 2024-03-04 RX ORDER — FUROSEMIDE 20 MG/1
20 TABLET ORAL DAILY
Qty: 90 TABLET | Refills: 0 | Status: SHIPPED | OUTPATIENT
Start: 2024-03-04

## 2024-03-04 RX ORDER — NAPROXEN 500 MG/1
500 TABLET ORAL 2 TIMES DAILY PRN
Qty: 30 TABLET | Refills: 0 | Status: SHIPPED | OUTPATIENT
Start: 2024-03-04

## 2024-03-04 RX ADMIN — DEXAMETHASONE SODIUM PHOSPHATE 10 MG: 10 INJECTION INTRAMUSCULAR; INTRAVENOUS at 23:50

## 2024-03-04 RX ADMIN — KETOROLAC TROMETHAMINE 30 MG: 30 INJECTION INTRAMUSCULAR; INTRAVENOUS at 23:51

## 2024-03-04 RX ADMIN — HYDROCODONE BITARTRATE AND ACETAMINOPHEN 1 TABLET: 5; 325 TABLET ORAL at 23:48

## 2024-03-04 ASSESSMENT — PAIN DESCRIPTION - ORIENTATION: ORIENTATION: RIGHT

## 2024-03-04 ASSESSMENT — PAIN - FUNCTIONAL ASSESSMENT: PAIN_FUNCTIONAL_ASSESSMENT: 0-10

## 2024-03-04 ASSESSMENT — PAIN SCALES - GENERAL
PAINLEVEL_OUTOF10: 10
PAINLEVEL_OUTOF10: 10

## 2024-03-04 ASSESSMENT — LIFESTYLE VARIABLES
HOW MANY STANDARD DRINKS CONTAINING ALCOHOL DO YOU HAVE ON A TYPICAL DAY: 3 OR 4
HOW OFTEN DO YOU HAVE A DRINK CONTAINING ALCOHOL: 2-3 TIMES A WEEK

## 2024-03-04 ASSESSMENT — PAIN DESCRIPTION - DESCRIPTORS: DESCRIPTORS: SHOOTING

## 2024-03-04 ASSESSMENT — PAIN DESCRIPTION - LOCATION: LOCATION: WRIST

## 2024-03-05 VITALS
OXYGEN SATURATION: 97 % | BODY MASS INDEX: 32.23 KG/M2 | HEIGHT: 72 IN | HEART RATE: 74 BPM | DIASTOLIC BLOOD PRESSURE: 105 MMHG | RESPIRATION RATE: 16 BRPM | TEMPERATURE: 98.3 F | SYSTOLIC BLOOD PRESSURE: 150 MMHG | WEIGHT: 238 LBS

## 2024-03-05 ASSESSMENT — PAIN DESCRIPTION - LOCATION: LOCATION: WRIST

## 2024-03-05 ASSESSMENT — PAIN DESCRIPTION - DESCRIPTORS: DESCRIPTORS: SHOOTING

## 2024-03-05 ASSESSMENT — PAIN DESCRIPTION - ORIENTATION: ORIENTATION: RIGHT

## 2024-03-05 ASSESSMENT — PAIN SCALES - GENERAL: PAINLEVEL_OUTOF10: 5

## 2024-03-05 NOTE — ED PROVIDER NOTES
Emergency Department Provider Note       PCP: Kolby Bowden MD   Age: 63 y.o.   Sex: male     DISPOSITION Decision To Discharge 03/04/2024 11:41:24 PM       ICD-10-CM    1. Arthritis of right wrist  M19.031 [facility]          Medical Decision Making     Patient comes to the ED for evaluation of right wrist pain that has been ongoing for the past several days.  Patient reports recently teaching his granddaughter how to shoot free throws, which she has not done in over 30 years.  Patient states he does have a history of gout and is on daily allopurinol for this.  He states the pain in his right wrist is currently worse than a flare of his gouty arthritis.  Patient denies any injury to his right wrist.  He is right-hand dominant.  Wrist without erythema on exam.  Patient is afebrile.  He is hypertensive.  Patient states he is on metformin, however, denies taking insulin for DM.  Patient denies history of CKD.  Images of his right wrist obtained, x-rays without acute fracture.  Patient given IM Toradol, Decadron and p.o. Norco while in the ED. Cr on 1/16/2024 is 1.4.  Patient is stable for [place] home, prescription for naproxen sent to his pharmacy.  Strict return precautions discussed.     1 acute complicated illness or injury.    Over the counter drug management performed.  Prescription drug management performed.  Parental controlled substances given in the ED.  Shared medical decision making was utilized in creating the patients health plan today.    I independently ordered and reviewed each unique test.    I reviewed external records: BMP reviewed from 1/16/2024        I interpreted the X-rays right wrist x-ray without acute fracture..        History     Patient comes to the ED for evaluation of right wrist pain that has been ongoing for the past several days.  Patient reports recently teaching his granddaughter how to shoot free throws, which she has not done in over 30

## 2024-03-05 NOTE — ED TRIAGE NOTES
Pt ambulatory to triage with complaint of right arm pain since Sunday that is worsening. States he was shooting a basketball Saturday and believes he may have injured himself.

## 2024-03-05 NOTE — DISCHARGE INSTRUCTIONS
If you develop a fever, have increased swelling, or any other concerning symptoms, please return to the ER immediately.  Please ice and elevate your wrist when you are sitting.  Follow up with the orthopedic physician's as discussed.

## 2024-03-08 NOTE — PROGRESS NOTES
reviewed with patient. .       ASSESSMENT and PLAN    Smooth was seen today for 3 month follow-up and cardiomyopathy.    Diagnoses and all orders for this visit:    Dilated cardiomyopathy (HCC)  -     Echo (TTE) limited (PRN contrast/bubble/strain/3D); Future    Essential hypertension          IMPRESSION:    Low cardiovascular risk for any planned orthopedic procedures.      NYHA I.  Continue meds.  Limited echo now, letter with results, return if significant abnormality.      BP much improved, continue meds and active lifestyle (just joined the [employer]!)        Return in about 6 months (around 9/11/2024) for WILL SEND LETTER WITH RESULTS; ECHO NOW.          Thank you for allowing me to participate in this patient's care.  Please call or contact me if there are any questions or concerns regarding the above.      JAMAL BREWER MD  03/11/24  8:42 AM

## 2024-03-11 ENCOUNTER — OFFICE VISIT (OUTPATIENT)
Age: 64
End: 2024-03-11
Payer: MEDICARE

## 2024-03-11 VITALS
BODY MASS INDEX: 32.78 KG/M2 | SYSTOLIC BLOOD PRESSURE: 138 MMHG | HEIGHT: 72 IN | WEIGHT: 242 LBS | DIASTOLIC BLOOD PRESSURE: 88 MMHG | HEART RATE: 80 BPM

## 2024-03-11 DIAGNOSIS — I10 ESSENTIAL HYPERTENSION: ICD-10-CM

## 2024-03-11 DIAGNOSIS — I42.0 DILATED CARDIOMYOPATHY (HCC): Primary | ICD-10-CM

## 2024-03-11 PROCEDURE — G8482 FLU IMMUNIZE ORDER/ADMIN: HCPCS | Performed by: INTERNAL MEDICINE

## 2024-03-11 PROCEDURE — 3017F COLORECTAL CA SCREEN DOC REV: CPT | Performed by: INTERNAL MEDICINE

## 2024-03-11 PROCEDURE — 3075F SYST BP GE 130 - 139MM HG: CPT | Performed by: INTERNAL MEDICINE

## 2024-03-11 PROCEDURE — 3079F DIAST BP 80-89 MM HG: CPT | Performed by: INTERNAL MEDICINE

## 2024-03-11 PROCEDURE — 99214 OFFICE O/P EST MOD 30 MIN: CPT | Performed by: INTERNAL MEDICINE

## 2024-03-11 PROCEDURE — 1036F TOBACCO NON-USER: CPT | Performed by: INTERNAL MEDICINE

## 2024-03-11 PROCEDURE — G8427 DOCREV CUR MEDS BY ELIG CLIN: HCPCS | Performed by: INTERNAL MEDICINE

## 2024-03-11 PROCEDURE — G8417 CALC BMI ABV UP PARAM F/U: HCPCS | Performed by: INTERNAL MEDICINE

## 2024-03-11 NOTE — PATIENT INSTRUCTIONS
liability for your use of this information.       Please visit [email or web address] for more information regarding cardiovascular disease prevention and treatment.

## 2024-04-08 ENCOUNTER — TELEPHONE (OUTPATIENT)
Age: 64
End: 2024-04-08

## 2024-04-08 DIAGNOSIS — I10 ESSENTIAL (PRIMARY) HYPERTENSION: ICD-10-CM

## 2024-04-08 NOTE — TELEPHONE ENCOUNTER
Spoke with pt he stated he has been having spikes in his BP since stopping the losartan.     Med: Coreg 25mg BID, Lasix 20mg, Entresto 49-51mg BID    Wonders what med change can be done.

## 2024-04-09 NOTE — TELEPHONE ENCOUNTER
Spoke with pt and review  note. Pt v/u   Order placed as seen below  Orders Placed This Encounter   Procedures    Basic Metabolic Panel     Standing Status:   Future     Standing Expiration Date:   4/9/2025     Requested Prescriptions     Signed Prescriptions Disp Refills    sacubitril-valsartan (ENTRESTO)  MG per tablet 60 tablet 3     Sig: Take 1 tablet by mouth 2 times daily     Authorizing Provider: JAMAL BREWER     Ordering User: AYDEN PENNINGTON

## 2024-04-09 NOTE — TELEPHONE ENCOUNTER
Pt stated he has had spike for past 2-3 weeks     BP ranging 150-130/100-80  SX:headache    Pt checks his BP at local [facility] every 2-3 days around 1pm    BP meds taken in the morning    Pt could not give a current BP, however BP was 154/104 yesterday at his doctor visit.

## 2024-04-10 ENCOUNTER — TELEPHONE (OUTPATIENT)
Age: 64
End: 2024-04-10

## 2024-04-10 NOTE — TELEPHONE ENCOUNTER
Pt calling as he is experiencing very dry mouth and has been extremely dry for 2 days.  Can hardly swallow and is having a hard time talking. Wants to know if it is a med interaction?  Cannot seem to get enough fluid to keep his mouth hydrated.

## 2024-04-10 NOTE — TELEPHONE ENCOUNTER
Pt has not started any new med or the increase dose of the entresto from yesterday 4/09/2024.     Advised pt this could not be a drug interaction since he has not started any new meds since March.     Pt v/u

## 2024-05-16 ENCOUNTER — OFFICE VISIT (OUTPATIENT)
Dept: INTERNAL MEDICINE CLINIC | Facility: CLINIC | Age: 64
End: 2024-05-16
Payer: MEDICARE

## 2024-05-16 VITALS
BODY MASS INDEX: 33.72 KG/M2 | TEMPERATURE: 97.1 F | SYSTOLIC BLOOD PRESSURE: 127 MMHG | WEIGHT: 249 LBS | DIASTOLIC BLOOD PRESSURE: 84 MMHG | HEIGHT: 72 IN | OXYGEN SATURATION: 95 % | HEART RATE: 69 BPM

## 2024-05-16 DIAGNOSIS — I42.0 DILATED CARDIOMYOPATHY (HCC): ICD-10-CM

## 2024-05-16 DIAGNOSIS — M10.9 ACUTE GOUT OF RIGHT HAND, UNSPECIFIED CAUSE: ICD-10-CM

## 2024-05-16 DIAGNOSIS — E11.9 TYPE 2 DIABETES MELLITUS WITHOUT COMPLICATION, WITHOUT LONG-TERM CURRENT USE OF INSULIN (HCC): Primary | ICD-10-CM

## 2024-05-16 DIAGNOSIS — E78.2 MIXED HYPERLIPIDEMIA: ICD-10-CM

## 2024-05-16 DIAGNOSIS — I10 ESSENTIAL (PRIMARY) HYPERTENSION: ICD-10-CM

## 2024-05-16 PROCEDURE — 2022F DILAT RTA XM EVC RTNOPTHY: CPT | Performed by: INTERNAL MEDICINE

## 2024-05-16 PROCEDURE — G8427 DOCREV CUR MEDS BY ELIG CLIN: HCPCS | Performed by: INTERNAL MEDICINE

## 2024-05-16 PROCEDURE — 3079F DIAST BP 80-89 MM HG: CPT | Performed by: INTERNAL MEDICINE

## 2024-05-16 PROCEDURE — 3074F SYST BP LT 130 MM HG: CPT | Performed by: INTERNAL MEDICINE

## 2024-05-16 PROCEDURE — 1036F TOBACCO NON-USER: CPT | Performed by: INTERNAL MEDICINE

## 2024-05-16 PROCEDURE — 3044F HG A1C LEVEL LT 7.0%: CPT | Performed by: INTERNAL MEDICINE

## 2024-05-16 PROCEDURE — 99214 OFFICE O/P EST MOD 30 MIN: CPT | Performed by: INTERNAL MEDICINE

## 2024-05-16 PROCEDURE — G8417 CALC BMI ABV UP PARAM F/U: HCPCS | Performed by: INTERNAL MEDICINE

## 2024-05-16 PROCEDURE — G2211 COMPLEX E/M VISIT ADD ON: HCPCS | Performed by: INTERNAL MEDICINE

## 2024-05-16 PROCEDURE — 3017F COLORECTAL CA SCREEN DOC REV: CPT | Performed by: INTERNAL MEDICINE

## 2024-05-16 RX ORDER — ATORVASTATIN CALCIUM 40 MG/1
40 TABLET, FILM COATED ORAL DAILY
Qty: 90 TABLET | Refills: 1 | Status: SHIPPED | OUTPATIENT
Start: 2024-05-16

## 2024-05-16 RX ORDER — CARVEDILOL 25 MG/1
25 TABLET ORAL 2 TIMES DAILY WITH MEALS
Qty: 180 TABLET | Refills: 1 | Status: SHIPPED | OUTPATIENT
Start: 2024-05-16

## 2024-05-16 RX ORDER — METFORMIN HYDROCHLORIDE 500 MG/1
TABLET, EXTENDED RELEASE ORAL
Qty: 90 TABLET | Refills: 1 | Status: SHIPPED | OUTPATIENT
Start: 2024-05-16

## 2024-05-16 RX ORDER — FUROSEMIDE 20 MG/1
20 TABLET ORAL DAILY
Qty: 90 TABLET | Refills: 1 | Status: SHIPPED | OUTPATIENT
Start: 2024-05-16

## 2024-05-16 RX ORDER — POTASSIUM CHLORIDE 750 MG/1
10 TABLET, EXTENDED RELEASE ORAL DAILY PRN
Qty: 90 TABLET | Refills: 1 | Status: SHIPPED | OUTPATIENT
Start: 2024-05-16

## 2024-05-16 RX ORDER — ALLOPURINOL 300 MG/1
300 TABLET ORAL DAILY
Qty: 90 TABLET | Refills: 1 | Status: SHIPPED | OUTPATIENT
Start: 2024-05-16

## 2024-05-16 ASSESSMENT — ENCOUNTER SYMPTOMS: SHORTNESS OF BREATH: 0

## 2024-05-16 NOTE — PROGRESS NOTES
[facility]  Kolby Bowden M.D.  Internal Medicine  [address] [place] [id number]  Office : [phone]  Fax : [phone]    Chief Complaint   Patient presents with    Diabetes     He also states he is tiring quickly and has no energy.       History of Present Illness:  Smooth Borrero is a 63 y.o. male.  HPI      Diabetes Mellitus  Patient presents  for follow up on diabetes.  Onset of symptoms was several years ago. Patient describes symptoms as none. Course to date has been well controlled.Diet and Lifestyle: follows a diabetic diet regularly  exercises sporadically  nonsmoker  Home glucose monitoring:  Results average n/a. Patient denies polyuria and polydipsia. No wounds in lower limbs.  Most recent HbA1c was   Hemoglobin A1C   Date Value Ref Range Status   01/16/2024 6.0 (H) 4.8 - 5.6 % Final       Hypertension  Patient is in for Hypertension which is stable.    Diet and Lifestyle: generally follows a low sodium diet  Home BP Monitoring: is not measured at home. Patient's recent blood pressures were   BP Readings from Last 3 Encounters:   05/16/24 127/84   03/21/24 (!) 140/85   03/11/24 138/88   .   taking medications as instructed, no medication side effects noted, no TIA's, no chest pain on exertion, no dyspnea on exertion, no swelling of ankles. Cardiac risk factors consist of advanced age (older than 55 for men, 65 for women), diabetes mellitus, dyslipidemia, hypertension, male gender, and obesity (BMI >= 30 kg/m2).  Lab Results   Component Value Date/Time     01/16/2024 08:51 AM    K 3.5 01/16/2024 08:51 AM     01/16/2024 08:51 AM    CO2 27 01/16/2024 08:51 AM    BUN 15 01/16/2024 08:51 AM    CREATININE 1.40 01/16/2024 08:51 AM    GLUCOSE 122 01/16/2024 08:51 AM    CALCIUM 9.1 01/16/2024 08:51 AM    LABGLOM 57 01/16/2024 08:51 AM        Hyperlipidemia  Patient is in for follow-up for hyperlipidemia.  Diet and Lifestyle: nonsmoker.

## 2024-07-18 ENCOUNTER — OFFICE VISIT (OUTPATIENT)
Dept: INTERNAL MEDICINE CLINIC | Facility: CLINIC | Age: 64
End: 2024-07-18
Payer: MEDICARE

## 2024-07-18 VITALS
TEMPERATURE: 98 F | SYSTOLIC BLOOD PRESSURE: 142 MMHG | OXYGEN SATURATION: 97 % | HEART RATE: 72 BPM | BODY MASS INDEX: 32.95 KG/M2 | WEIGHT: 243 LBS | DIASTOLIC BLOOD PRESSURE: 91 MMHG

## 2024-07-18 DIAGNOSIS — I10 ESSENTIAL (PRIMARY) HYPERTENSION: ICD-10-CM

## 2024-07-18 DIAGNOSIS — Z12.5 SCREENING FOR PROSTATE CANCER: ICD-10-CM

## 2024-07-18 DIAGNOSIS — Z00.00 INITIAL MEDICARE ANNUAL WELLNESS VISIT: ICD-10-CM

## 2024-07-18 DIAGNOSIS — E78.2 MIXED HYPERLIPIDEMIA: ICD-10-CM

## 2024-07-18 DIAGNOSIS — E11.9 TYPE 2 DIABETES MELLITUS WITHOUT COMPLICATION, WITHOUT LONG-TERM CURRENT USE OF INSULIN (HCC): ICD-10-CM

## 2024-07-18 DIAGNOSIS — E78.2 MIXED HYPERLIPIDEMIA: Primary | ICD-10-CM

## 2024-07-18 PROBLEM — E11.22 TYPE 2 DIABETES MELLITUS WITH CHRONIC KIDNEY DISEASE (HCC): Status: ACTIVE | Noted: 2024-07-18

## 2024-07-18 LAB
ALBUMIN SERPL-MCNC: 3.5 G/DL (ref 3.2–4.6)
ALBUMIN/GLOB SERPL: 1 (ref 1–1.9)
ALP SERPL-CCNC: 62 U/L (ref 40–129)
ALT SERPL-CCNC: 50 U/L (ref 12–65)
ANION GAP SERPL CALC-SCNC: 9 MMOL/L (ref 9–18)
AST SERPL-CCNC: 39 U/L (ref 15–37)
BASOPHILS # BLD: 0 K/UL (ref 0–0.2)
BASOPHILS NFR BLD: 1 % (ref 0–2)
BILIRUB DIRECT SERPL-MCNC: <0.2 MG/DL (ref 0–0.4)
BILIRUB SERPL-MCNC: 0.4 MG/DL (ref 0–1.2)
BUN SERPL-MCNC: 16 MG/DL (ref 8–23)
CALCIUM SERPL-MCNC: 9.1 MG/DL (ref 8.8–10.2)
CHLORIDE SERPL-SCNC: 105 MMOL/L (ref 98–107)
CHOLEST SERPL-MCNC: 152 MG/DL (ref 0–200)
CO2 SERPL-SCNC: 26 MMOL/L (ref 20–28)
CREAT SERPL-MCNC: 1.22 MG/DL (ref 0.8–1.3)
DIFFERENTIAL METHOD BLD: ABNORMAL
EOSINOPHIL # BLD: 0.4 K/UL (ref 0–0.8)
EOSINOPHIL NFR BLD: 6 % (ref 0.5–7.8)
ERYTHROCYTE [DISTWIDTH] IN BLOOD BY AUTOMATED COUNT: 15.5 % (ref 11.9–14.6)
EST. AVERAGE GLUCOSE BLD GHB EST-MCNC: 135 MG/DL
GLOBULIN SER CALC-MCNC: 3.4 G/DL (ref 2.3–3.5)
GLUCOSE SERPL-MCNC: 110 MG/DL (ref 70–99)
HBA1C MFR BLD: 6.3 % (ref 0–5.6)
HCT VFR BLD AUTO: 42.1 % (ref 41.1–50.3)
HDLC SERPL-MCNC: 47 MG/DL (ref 40–60)
HDLC SERPL: 3.2 (ref 0–5)
HGB BLD-MCNC: 13.4 G/DL (ref 13.6–17.2)
IMM GRANULOCYTES # BLD AUTO: 0 K/UL (ref 0–0.5)
IMM GRANULOCYTES NFR BLD AUTO: 0 % (ref 0–5)
LDLC SERPL CALC-MCNC: 53 MG/DL (ref 0–100)
LYMPHOCYTES # BLD: 2 K/UL (ref 0.5–4.6)
LYMPHOCYTES NFR BLD: 33 % (ref 13–44)
MCH RBC QN AUTO: 29.5 PG (ref 26.1–32.9)
MCHC RBC AUTO-ENTMCNC: 31.8 G/DL (ref 31.4–35)
MCV RBC AUTO: 92.7 FL (ref 82–102)
MONOCYTES # BLD: 0.5 K/UL (ref 0.1–1.3)
MONOCYTES NFR BLD: 9 % (ref 4–12)
NEUTS SEG # BLD: 3.1 K/UL (ref 1.7–8.2)
NEUTS SEG NFR BLD: 51 % (ref 43–78)
NRBC # BLD: 0 K/UL (ref 0–0.2)
PLATELET # BLD AUTO: 214 K/UL (ref 150–450)
PMV BLD AUTO: 10.1 FL (ref 9.4–12.3)
POTASSIUM SERPL-SCNC: 4.4 MMOL/L (ref 3.5–5.1)
PROT SERPL-MCNC: 7 G/DL (ref 6.3–8.2)
PSA SERPL-MCNC: 0.6 NG/ML (ref 0–4)
RBC # BLD AUTO: 4.54 M/UL (ref 4.23–5.6)
SODIUM SERPL-SCNC: 141 MMOL/L (ref 136–145)
TRIGL SERPL-MCNC: 260 MG/DL (ref 0–150)
VLDLC SERPL CALC-MCNC: 52 MG/DL (ref 6–23)
WBC # BLD AUTO: 6 K/UL (ref 4.3–11.1)

## 2024-07-18 PROCEDURE — 3080F DIAST BP >= 90 MM HG: CPT | Performed by: INTERNAL MEDICINE

## 2024-07-18 PROCEDURE — G0438 PPPS, INITIAL VISIT: HCPCS | Performed by: INTERNAL MEDICINE

## 2024-07-18 PROCEDURE — 3017F COLORECTAL CA SCREEN DOC REV: CPT | Performed by: INTERNAL MEDICINE

## 2024-07-18 PROCEDURE — 3044F HG A1C LEVEL LT 7.0%: CPT | Performed by: INTERNAL MEDICINE

## 2024-07-18 PROCEDURE — 3077F SYST BP >= 140 MM HG: CPT | Performed by: INTERNAL MEDICINE

## 2024-07-18 SDOH — ECONOMIC STABILITY: FOOD INSECURITY: WITHIN THE PAST 12 MONTHS, THE FOOD YOU BOUGHT JUST DIDN'T LAST AND YOU DIDN'T HAVE MONEY TO GET MORE.: NEVER TRUE

## 2024-07-18 SDOH — ECONOMIC STABILITY: INCOME INSECURITY: HOW HARD IS IT FOR YOU TO PAY FOR THE VERY BASICS LIKE FOOD, HOUSING, MEDICAL CARE, AND HEATING?: NOT HARD AT ALL

## 2024-07-18 SDOH — ECONOMIC STABILITY: FOOD INSECURITY: WITHIN THE PAST 12 MONTHS, YOU WORRIED THAT YOUR FOOD WOULD RUN OUT BEFORE YOU GOT MONEY TO BUY MORE.: NEVER TRUE

## 2024-07-18 ASSESSMENT — ANXIETY QUESTIONNAIRES
1. FEELING NERVOUS, ANXIOUS, OR ON EDGE: NOT AT ALL
IF YOU CHECKED OFF ANY PROBLEMS ON THIS QUESTIONNAIRE, HOW DIFFICULT HAVE THESE PROBLEMS MADE IT FOR YOU TO DO YOUR WORK, TAKE CARE OF THINGS AT HOME, OR GET ALONG WITH OTHER PEOPLE: NOT DIFFICULT AT ALL
4. TROUBLE RELAXING: NOT AT ALL
6. BECOMING EASILY ANNOYED OR IRRITABLE: NOT AT ALL
3. WORRYING TOO MUCH ABOUT DIFFERENT THINGS: NOT AT ALL
2. NOT BEING ABLE TO STOP OR CONTROL WORRYING: NOT AT ALL
GAD7 TOTAL SCORE: 0
7. FEELING AFRAID AS IF SOMETHING AWFUL MIGHT HAPPEN: NOT AT ALL
5. BEING SO RESTLESS THAT IT IS HARD TO SIT STILL: NOT AT ALL

## 2024-07-18 ASSESSMENT — PATIENT HEALTH QUESTIONNAIRE - PHQ9
3. TROUBLE FALLING OR STAYING ASLEEP: NOT AT ALL
SUM OF ALL RESPONSES TO PHQ QUESTIONS 1-9: 0
8. MOVING OR SPEAKING SO SLOWLY THAT OTHER PEOPLE COULD HAVE NOTICED. OR THE OPPOSITE, BEING SO FIGETY OR RESTLESS THAT YOU HAVE BEEN MOVING AROUND A LOT MORE THAN USUAL: NOT AT ALL
SUM OF ALL RESPONSES TO PHQ9 QUESTIONS 1 & 2: 0
10. IF YOU CHECKED OFF ANY PROBLEMS, HOW DIFFICULT HAVE THESE PROBLEMS MADE IT FOR YOU TO DO YOUR WORK, TAKE CARE OF THINGS AT HOME, OR GET ALONG WITH OTHER PEOPLE: NOT DIFFICULT AT ALL
SUM OF ALL RESPONSES TO PHQ QUESTIONS 1-9: 0
4. FEELING TIRED OR HAVING LITTLE ENERGY: NOT AT ALL
1. LITTLE INTEREST OR PLEASURE IN DOING THINGS: NOT AT ALL
5. POOR APPETITE OR OVEREATING: NOT AT ALL
2. FEELING DOWN, DEPRESSED OR HOPELESS: NOT AT ALL
7. TROUBLE CONCENTRATING ON THINGS, SUCH AS READING THE NEWSPAPER OR WATCHING TELEVISION: NOT AT ALL
SUM OF ALL RESPONSES TO PHQ QUESTIONS 1-9: 0
SUM OF ALL RESPONSES TO PHQ QUESTIONS 1-9: 0
6. FEELING BAD ABOUT YOURSELF - OR THAT YOU ARE A FAILURE OR HAVE LET YOURSELF OR YOUR FAMILY DOWN: NOT AT ALL
9. THOUGHTS THAT YOU WOULD BE BETTER OFF DEAD, OR OF HURTING YOURSELF: NOT AT ALL

## 2024-07-18 ASSESSMENT — LIFESTYLE VARIABLES
HOW MANY STANDARD DRINKS CONTAINING ALCOHOL DO YOU HAVE ON A TYPICAL DAY: 1 OR 2
HOW OFTEN DO YOU HAVE A DRINK CONTAINING ALCOHOL: MONTHLY OR LESS

## 2024-07-18 NOTE — PATIENT INSTRUCTIONS
Shortness of breath.     Pain, pressure, or a strange feeling in the back, neck, jaw, or upper belly or in one or both shoulders or arms.     Lightheadedness or sudden weakness.     A fast or irregular heartbeat.   After you call 911, the  may tell you to chew 1 adult-strength or 2 to 4 low-dose aspirin. Wait for an ambulance. Do not try to drive yourself.  Watch closely for changes in your health, and be sure to contact your doctor if you have any problems.  Where can you learn more?  Go to https://www.[employer].net/patientEd and enter F075 to learn more about \"A Healthy Heart: Care Instructions.\"  Current as of: June 24, 2023  Content Version: 14.1  © 5173-9843 [employer].   Care instructions adapted under license by [employer]. If you have questions about a medical condition or this instruction, always ask your healthcare professional. [employer] disclaims any warranty or liability for your use of this information.      Personalized Preventive Plan for Smooth Borrero - 7/18/2024  Medicare offers a range of preventive health benefits. Some of the tests and screenings are paid in full while other may be subject to a deductible, co-insurance, and/or copay.    Some of these benefits include a comprehensive review of your medical history including lifestyle, illnesses that may run in your family, and various assessments and screenings as appropriate.    After reviewing your medical record and screening and assessments performed today your provider may have ordered immunizations, labs, imaging, and/or referrals for you.  A list of these orders (if applicable) as well as your Preventive Care list are included within your After Visit Summary for your review.    Other Preventive Recommendations:    A preventive eye exam performed by an eye specialist is recommended every 1-2 years to screen for glaucoma; cataracts, macular degeneration, and other eye disorders.  A preventive dental

## 2024-07-18 NOTE — PROGRESS NOTES
[facility]  Kolby Bowden M.D.  Internal Medicine  [address] [place] [address]  Office : [phone]  Fax : [phone]    CHIEF COMPLAINT  Chief Complaint   Patient presents with    Medicare AWV     Initial AWV         Medicare Annual Wellness Visit    Smooth Borrero is here for Medicare AWV (Initial AWV)    Assessment & Plan   Mixed hyperlipidemia  -     Hepatic Function Panel; Future  -     Lipid Panel; Future  Type 2 diabetes mellitus without complication, without long-term current use of insulin (HCC)  -     Hemoglobin A1C; Future  -     Microalbumin / Creatinine Urine Ratio; Future  Essential (primary) hypertension  -     Basic Metabolic Panel; Future  -     CBC with Auto Differential; Future  Screening for prostate cancer  -     PSA Screening; Future  Initial Medicare annual wellness visit    Recommendations for Preventive Services Due: see orders and patient instructions/AVS.  Recommended screening schedule for the next 5-10 years is provided to the patient in written form: see Patient Instructions/AVS.     Return if symptoms worsen or fail to improve.     Subjective       Patient's complete Health Risk Assessment and screening values have been reviewed and are found in Flowsheets. The following problems were reviewed today and where indicated follow up appointments were made and/or referrals ordered.    Positive Risk Factor Screenings with Interventions:         Alcohol Screening:  Alcohol Use: Not At Risk (7/18/2024)    AUDIT-C     Frequency of Alcohol Consumption: Monthly or less     Average Number of Drinks: 1 or 2     Frequency of Binge Drinking: Less than monthly      AUDIT-C Score: 2          Interventions:  Alcohol consumption guidelines reviewed. Moderation recommended             General HRA Questions:  Select all that apply: (!) New or Increased Pain    Interventions - Pain:  He denied any new problems      Inactivity:  On average,

## 2024-09-12 ENCOUNTER — OFFICE VISIT (OUTPATIENT)
Age: 64
End: 2024-09-12
Payer: MEDICARE

## 2024-09-12 VITALS
SYSTOLIC BLOOD PRESSURE: 154 MMHG | HEART RATE: 60 BPM | WEIGHT: 249 LBS | HEIGHT: 72 IN | BODY MASS INDEX: 33.72 KG/M2 | DIASTOLIC BLOOD PRESSURE: 100 MMHG

## 2024-09-12 DIAGNOSIS — I42.0 DILATED CARDIOMYOPATHY (HCC): Primary | ICD-10-CM

## 2024-09-12 DIAGNOSIS — I50.22 CHRONIC SYSTOLIC (CONGESTIVE) HEART FAILURE (HCC): ICD-10-CM

## 2024-09-12 PROCEDURE — 3077F SYST BP >= 140 MM HG: CPT | Performed by: INTERNAL MEDICINE

## 2024-09-12 PROCEDURE — 3017F COLORECTAL CA SCREEN DOC REV: CPT | Performed by: INTERNAL MEDICINE

## 2024-09-12 PROCEDURE — G8417 CALC BMI ABV UP PARAM F/U: HCPCS | Performed by: INTERNAL MEDICINE

## 2024-09-12 PROCEDURE — 99214 OFFICE O/P EST MOD 30 MIN: CPT | Performed by: INTERNAL MEDICINE

## 2024-09-12 PROCEDURE — 1036F TOBACCO NON-USER: CPT | Performed by: INTERNAL MEDICINE

## 2024-09-12 PROCEDURE — 3080F DIAST BP >= 90 MM HG: CPT | Performed by: INTERNAL MEDICINE

## 2024-09-12 PROCEDURE — G8427 DOCREV CUR MEDS BY ELIG CLIN: HCPCS | Performed by: INTERNAL MEDICINE

## 2024-09-12 RX ORDER — FUROSEMIDE 20 MG
20 TABLET ORAL DAILY PRN
Qty: 90 TABLET | Refills: 1
Start: 2024-09-12

## 2024-09-12 RX ORDER — SPIRONOLACTONE 25 MG/1
25 TABLET ORAL DAILY
Qty: 90 TABLET | Refills: 3 | Status: SHIPPED | OUTPATIENT
Start: 2024-09-12

## 2024-09-12 ASSESSMENT — ENCOUNTER SYMPTOMS: SHORTNESS OF BREATH: 0

## 2024-09-19 ENCOUNTER — OFFICE VISIT (OUTPATIENT)
Dept: INTERNAL MEDICINE CLINIC | Facility: CLINIC | Age: 64
End: 2024-09-19
Payer: MEDICARE

## 2024-09-19 VITALS
HEART RATE: 71 BPM | DIASTOLIC BLOOD PRESSURE: 98 MMHG | OXYGEN SATURATION: 95 % | WEIGHT: 249 LBS | HEIGHT: 72 IN | BODY MASS INDEX: 33.72 KG/M2 | SYSTOLIC BLOOD PRESSURE: 144 MMHG | TEMPERATURE: 97.3 F

## 2024-09-19 DIAGNOSIS — M10.9 ACUTE GOUT OF RIGHT HAND, UNSPECIFIED CAUSE: ICD-10-CM

## 2024-09-19 DIAGNOSIS — I10 ESSENTIAL (PRIMARY) HYPERTENSION: ICD-10-CM

## 2024-09-19 DIAGNOSIS — E11.9 TYPE 2 DIABETES MELLITUS WITHOUT COMPLICATION, WITHOUT LONG-TERM CURRENT USE OF INSULIN (HCC): Primary | ICD-10-CM

## 2024-09-19 DIAGNOSIS — E78.2 MIXED HYPERLIPIDEMIA: ICD-10-CM

## 2024-09-19 DIAGNOSIS — I42.0 DILATED CARDIOMYOPATHY (HCC): ICD-10-CM

## 2024-09-19 LAB
CREAT UR-MCNC: 90 MG/DL (ref 39–259)
MICROALBUMIN UR-MCNC: 10.6 MG/DL (ref 0–20)
MICROALBUMIN/CREAT UR-RTO: 118 MG/G (ref 0–30)

## 2024-09-19 PROCEDURE — 3017F COLORECTAL CA SCREEN DOC REV: CPT | Performed by: INTERNAL MEDICINE

## 2024-09-19 PROCEDURE — G8417 CALC BMI ABV UP PARAM F/U: HCPCS | Performed by: INTERNAL MEDICINE

## 2024-09-19 PROCEDURE — 3077F SYST BP >= 140 MM HG: CPT | Performed by: INTERNAL MEDICINE

## 2024-09-19 PROCEDURE — 3080F DIAST BP >= 90 MM HG: CPT | Performed by: INTERNAL MEDICINE

## 2024-09-19 PROCEDURE — 3044F HG A1C LEVEL LT 7.0%: CPT | Performed by: INTERNAL MEDICINE

## 2024-09-19 PROCEDURE — G8427 DOCREV CUR MEDS BY ELIG CLIN: HCPCS | Performed by: INTERNAL MEDICINE

## 2024-09-19 PROCEDURE — 2022F DILAT RTA XM EVC RTNOPTHY: CPT | Performed by: INTERNAL MEDICINE

## 2024-09-19 PROCEDURE — 99214 OFFICE O/P EST MOD 30 MIN: CPT | Performed by: INTERNAL MEDICINE

## 2024-09-19 PROCEDURE — G2211 COMPLEX E/M VISIT ADD ON: HCPCS | Performed by: INTERNAL MEDICINE

## 2024-09-19 PROCEDURE — 1036F TOBACCO NON-USER: CPT | Performed by: INTERNAL MEDICINE

## 2024-09-19 RX ORDER — ALLOPURINOL 300 MG/1
300 TABLET ORAL DAILY
Qty: 90 TABLET | Refills: 1 | Status: SHIPPED | OUTPATIENT
Start: 2024-09-19

## 2024-09-19 RX ORDER — CARVEDILOL 25 MG/1
25 TABLET ORAL 2 TIMES DAILY WITH MEALS
Qty: 180 TABLET | Refills: 1 | Status: SHIPPED | OUTPATIENT
Start: 2024-09-19

## 2024-09-19 RX ORDER — ATORVASTATIN CALCIUM 40 MG/1
40 TABLET, FILM COATED ORAL DAILY
Qty: 90 TABLET | Refills: 1 | Status: SHIPPED | OUTPATIENT
Start: 2024-09-19

## 2024-09-19 SDOH — ECONOMIC STABILITY: FOOD INSECURITY: WITHIN THE PAST 12 MONTHS, YOU WORRIED THAT YOUR FOOD WOULD RUN OUT BEFORE YOU GOT MONEY TO BUY MORE.: NEVER TRUE

## 2024-09-19 SDOH — ECONOMIC STABILITY: INCOME INSECURITY: HOW HARD IS IT FOR YOU TO PAY FOR THE VERY BASICS LIKE FOOD, HOUSING, MEDICAL CARE, AND HEATING?: NOT HARD AT ALL

## 2024-09-19 SDOH — ECONOMIC STABILITY: FOOD INSECURITY: WITHIN THE PAST 12 MONTHS, THE FOOD YOU BOUGHT JUST DIDN'T LAST AND YOU DIDN'T HAVE MONEY TO GET MORE.: NEVER TRUE

## 2024-09-19 ASSESSMENT — ENCOUNTER SYMPTOMS: SHORTNESS OF BREATH: 0

## 2024-09-19 ASSESSMENT — PATIENT HEALTH QUESTIONNAIRE - PHQ9
SUM OF ALL RESPONSES TO PHQ9 QUESTIONS 1 & 2: 0
5. POOR APPETITE OR OVEREATING: NOT AT ALL
8. MOVING OR SPEAKING SO SLOWLY THAT OTHER PEOPLE COULD HAVE NOTICED. OR THE OPPOSITE, BEING SO FIGETY OR RESTLESS THAT YOU HAVE BEEN MOVING AROUND A LOT MORE THAN USUAL: NOT AT ALL
7. TROUBLE CONCENTRATING ON THINGS, SUCH AS READING THE NEWSPAPER OR WATCHING TELEVISION: NOT AT ALL
SUM OF ALL RESPONSES TO PHQ QUESTIONS 1-9: 0
6. FEELING BAD ABOUT YOURSELF - OR THAT YOU ARE A FAILURE OR HAVE LET YOURSELF OR YOUR FAMILY DOWN: NOT AT ALL
SUM OF ALL RESPONSES TO PHQ QUESTIONS 1-9: 0
4. FEELING TIRED OR HAVING LITTLE ENERGY: NOT AT ALL
1. LITTLE INTEREST OR PLEASURE IN DOING THINGS: NOT AT ALL
3. TROUBLE FALLING OR STAYING ASLEEP: NOT AT ALL
2. FEELING DOWN, DEPRESSED OR HOPELESS: NOT AT ALL
9. THOUGHTS THAT YOU WOULD BE BETTER OFF DEAD, OR OF HURTING YOURSELF: NOT AT ALL
10. IF YOU CHECKED OFF ANY PROBLEMS, HOW DIFFICULT HAVE THESE PROBLEMS MADE IT FOR YOU TO DO YOUR WORK, TAKE CARE OF THINGS AT HOME, OR GET ALONG WITH OTHER PEOPLE: NOT DIFFICULT AT ALL
SUM OF ALL RESPONSES TO PHQ QUESTIONS 1-9: 0
SUM OF ALL RESPONSES TO PHQ QUESTIONS 1-9: 0

## 2024-09-19 ASSESSMENT — ANXIETY QUESTIONNAIRES
2. NOT BEING ABLE TO STOP OR CONTROL WORRYING: NOT AT ALL
4. TROUBLE RELAXING: NOT AT ALL
5. BEING SO RESTLESS THAT IT IS HARD TO SIT STILL: NOT AT ALL
GAD7 TOTAL SCORE: 0
3. WORRYING TOO MUCH ABOUT DIFFERENT THINGS: NOT AT ALL
1. FEELING NERVOUS, ANXIOUS, OR ON EDGE: NOT AT ALL
6. BECOMING EASILY ANNOYED OR IRRITABLE: NOT AT ALL
IF YOU CHECKED OFF ANY PROBLEMS ON THIS QUESTIONNAIRE, HOW DIFFICULT HAVE THESE PROBLEMS MADE IT FOR YOU TO DO YOUR WORK, TAKE CARE OF THINGS AT HOME, OR GET ALONG WITH OTHER PEOPLE: NOT DIFFICULT AT ALL
7. FEELING AFRAID AS IF SOMETHING AWFUL MIGHT HAPPEN: NOT AT ALL

## 2024-11-22 DIAGNOSIS — I42.0 DILATED CARDIOMYOPATHY (HCC): ICD-10-CM

## 2024-11-22 DIAGNOSIS — I50.22 CHRONIC SYSTOLIC (CONGESTIVE) HEART FAILURE (HCC): ICD-10-CM

## 2024-11-22 LAB
ANION GAP SERPL CALC-SCNC: 11 MMOL/L (ref 7–16)
BUN SERPL-MCNC: 19 MG/DL (ref 8–23)
CALCIUM SERPL-MCNC: 9.1 MG/DL (ref 8.8–10.2)
CHLORIDE SERPL-SCNC: 103 MMOL/L (ref 98–107)
CO2 SERPL-SCNC: 25 MMOL/L (ref 20–29)
CREAT SERPL-MCNC: 1.2 MG/DL (ref 0.8–1.3)
GLUCOSE SERPL-MCNC: 108 MG/DL (ref 70–99)
POTASSIUM SERPL-SCNC: 3.7 MMOL/L (ref 3.5–5.1)
SODIUM SERPL-SCNC: 138 MMOL/L (ref 136–145)

## 2024-12-12 ENCOUNTER — OFFICE VISIT (OUTPATIENT)
Age: 64
End: 2024-12-12
Payer: MEDICARE

## 2024-12-12 VITALS
SYSTOLIC BLOOD PRESSURE: 136 MMHG | WEIGHT: 242 LBS | HEART RATE: 72 BPM | DIASTOLIC BLOOD PRESSURE: 72 MMHG | BODY MASS INDEX: 32.82 KG/M2

## 2024-12-12 DIAGNOSIS — I42.0 DILATED CARDIOMYOPATHY (HCC): Primary | ICD-10-CM

## 2024-12-12 DIAGNOSIS — I50.22 CHRONIC SYSTOLIC (CONGESTIVE) HEART FAILURE (HCC): ICD-10-CM

## 2024-12-12 DIAGNOSIS — I10 ESSENTIAL (PRIMARY) HYPERTENSION: ICD-10-CM

## 2024-12-12 DIAGNOSIS — G47.33 OBSTRUCTIVE SLEEP APNEA: ICD-10-CM

## 2024-12-12 PROCEDURE — G8484 FLU IMMUNIZE NO ADMIN: HCPCS | Performed by: INTERNAL MEDICINE

## 2024-12-12 PROCEDURE — G8417 CALC BMI ABV UP PARAM F/U: HCPCS | Performed by: INTERNAL MEDICINE

## 2024-12-12 PROCEDURE — 99214 OFFICE O/P EST MOD 30 MIN: CPT | Performed by: INTERNAL MEDICINE

## 2024-12-12 PROCEDURE — G8427 DOCREV CUR MEDS BY ELIG CLIN: HCPCS | Performed by: INTERNAL MEDICINE

## 2024-12-12 PROCEDURE — 3078F DIAST BP <80 MM HG: CPT | Performed by: INTERNAL MEDICINE

## 2024-12-12 PROCEDURE — 1036F TOBACCO NON-USER: CPT | Performed by: INTERNAL MEDICINE

## 2024-12-12 PROCEDURE — 3075F SYST BP GE 130 - 139MM HG: CPT | Performed by: INTERNAL MEDICINE

## 2024-12-12 PROCEDURE — 3017F COLORECTAL CA SCREEN DOC REV: CPT | Performed by: INTERNAL MEDICINE

## 2024-12-12 NOTE — PROGRESS NOTES
[facility] CARDIOLOGY  [address] [place] [address], [address]  [place] [address]  PHONE: [phone]      24    NAME:  Smooth Borrero  : 1960  MRN: [id number]         SUBJECTIVE:   Smooth Borrero is a 64 y.o. male seen for a follow up visit regarding the following:     Chief Complaint   Patient presents with    Follow-up     3 months            HPI:  Follow up  Follow-up (3 months)   .    Follow up NICM with HFmrEF.   His mild renal insufficiency actually improved with SGLT2i and ARNI.  He continues to do well, his ellen plants are thriving, its helped both his mental and his physical health, see below.  He's felt well, some arthritic pain in his shoulder and knee.         He's cut back on his alcohol from a 12 pack of beer a day now down to no more than 2 a day. Some days drinking none.  Admits he used to drink when he was bored.  He started plowing a garden to try to address his boredom.  Put out 20 ellen plants, really the first time he's been active the way he wants since COVID.  Walking twice a week.      He had a HST per his PCP at [facility], had an old CPAP but he doesn't have the money to get a new one as recommended, nor did he really tolerate it in the first place.         PAST CARDIAC HISTORY:  2014      Reportedly diagnosed with CM  Aug 2019       Stress echo - normal resting EF with normal stress response  Mar-May 2021       COVID PNA - cardiac arrest (non-shockable rhythm with 6 minutes CPR), down for approx 30 minutes)       Echo EF ~ 50%       Echo - EF 40%, mild MR/AI  2021       NST (lauren) - mild distal anterior ischemia, EF 40%       LHC - normal coronaries. EF 50-55%  2023       Echo - EF 42%, LVE, mild/mod MR  Mar 2024       EF 40-45%                Cardiac Medications       Potassium Sparing Diuretics       spironolactone (ALDACTONE) 25 MG tablet Take 1 tablet by mouth daily       HMG CoA Reductase Inhibitors       atorvastatin (LIPITOR) 40 MG tablet Take 1

## 2024-12-27 RX ORDER — LISINOPRIL 20 MG/1
TABLET ORAL
Qty: 90 TABLET | Refills: 0 | OUTPATIENT
Start: 2024-12-27

## 2025-01-16 ENCOUNTER — APPOINTMENT (OUTPATIENT)
Dept: GENERAL RADIOLOGY | Age: 65
End: 2025-01-16
Payer: MEDICARE

## 2025-01-16 ENCOUNTER — APPOINTMENT (OUTPATIENT)
Dept: CT IMAGING | Age: 65
End: 2025-01-16
Payer: MEDICARE

## 2025-01-16 ENCOUNTER — HOSPITAL ENCOUNTER (EMERGENCY)
Age: 65
Discharge: HOME | End: 2025-01-16
Attending: EMERGENCY MEDICINE
Payer: MEDICARE

## 2025-01-16 VITALS
HEART RATE: 88 BPM | RESPIRATION RATE: 18 BRPM | WEIGHT: 242 LBS | SYSTOLIC BLOOD PRESSURE: 182 MMHG | TEMPERATURE: 97.9 F | OXYGEN SATURATION: 100 % | HEIGHT: 72 IN | BODY MASS INDEX: 32.78 KG/M2 | DIASTOLIC BLOOD PRESSURE: 96 MMHG

## 2025-01-16 DIAGNOSIS — R51.9 NONINTRACTABLE HEADACHE, UNSPECIFIED CHRONICITY PATTERN, UNSPECIFIED HEADACHE TYPE: ICD-10-CM

## 2025-01-16 DIAGNOSIS — I50.9 ACUTE ON CHRONIC CONGESTIVE HEART FAILURE, UNSPECIFIED HEART FAILURE TYPE (HCC): ICD-10-CM

## 2025-01-16 DIAGNOSIS — I10 ESSENTIAL HYPERTENSION: Primary | ICD-10-CM

## 2025-01-16 LAB
ALBUMIN SERPL-MCNC: 3.5 G/DL (ref 3.2–4.6)
ALBUMIN/GLOB SERPL: 0.8 (ref 1–1.9)
ALP SERPL-CCNC: 68 U/L (ref 40–129)
ALT SERPL-CCNC: 25 U/L (ref 8–55)
ANION GAP SERPL CALC-SCNC: 14 MMOL/L (ref 7–16)
AST SERPL-CCNC: 35 U/L (ref 15–37)
BASOPHILS # BLD: 0.02 K/UL (ref 0–0.2)
BASOPHILS NFR BLD: 0.3 % (ref 0–2)
BILIRUB SERPL-MCNC: 0.5 MG/DL (ref 0–1.2)
BUN SERPL-MCNC: 13 MG/DL (ref 8–23)
CALCIUM SERPL-MCNC: 9.1 MG/DL (ref 8.8–10.2)
CHLORIDE SERPL-SCNC: 102 MMOL/L (ref 98–107)
CO2 SERPL-SCNC: 21 MMOL/L (ref 20–29)
CREAT SERPL-MCNC: 1.45 MG/DL (ref 0.8–1.3)
DIFFERENTIAL METHOD BLD: ABNORMAL
EOSINOPHIL # BLD: 0.04 K/UL (ref 0–0.8)
EOSINOPHIL NFR BLD: 0.6 % (ref 0.5–7.8)
ERYTHROCYTE [DISTWIDTH] IN BLOOD BY AUTOMATED COUNT: 14.8 % (ref 11.9–14.6)
GLOBULIN SER CALC-MCNC: 4.3 G/DL (ref 2.3–3.5)
GLUCOSE SERPL-MCNC: 167 MG/DL (ref 70–99)
HCT VFR BLD AUTO: 41.5 % (ref 41.1–50.3)
HGB BLD-MCNC: 13.9 G/DL (ref 13.6–17.2)
IMM GRANULOCYTES # BLD AUTO: 0.02 K/UL (ref 0–0.5)
IMM GRANULOCYTES NFR BLD AUTO: 0.3 % (ref 0–5)
LYMPHOCYTES # BLD: 0.69 K/UL (ref 0.5–4.6)
LYMPHOCYTES NFR BLD: 11.2 % (ref 13–44)
MCH RBC QN AUTO: 29.5 PG (ref 26.1–32.9)
MCHC RBC AUTO-ENTMCNC: 33.5 G/DL (ref 31.4–35)
MCV RBC AUTO: 88.1 FL (ref 82–102)
MONOCYTES # BLD: 0.36 K/UL (ref 0.1–1.3)
MONOCYTES NFR BLD: 5.8 % (ref 4–12)
NEUTS SEG # BLD: 5.03 K/UL (ref 1.7–8.2)
NEUTS SEG NFR BLD: 81.8 % (ref 43–78)
NRBC # BLD: 0 K/UL (ref 0–0.2)
NT PRO BNP: 171 PG/ML (ref 0–125)
PLATELET # BLD AUTO: 206 K/UL (ref 150–450)
PMV BLD AUTO: 9.4 FL (ref 9.4–12.3)
POTASSIUM SERPL-SCNC: 3.6 MMOL/L (ref 3.5–5.1)
PROT SERPL-MCNC: 7.8 G/DL (ref 6.3–8.2)
RBC # BLD AUTO: 4.71 M/UL (ref 4.23–5.6)
SODIUM SERPL-SCNC: 138 MMOL/L (ref 136–145)
TROPONIN T SERPL HS-MCNC: 17 NG/L (ref 0–22)
TROPONIN T SERPL HS-MCNC: 19 NG/L (ref 0–22)
WBC # BLD AUTO: 6.2 K/UL (ref 4.3–11.1)

## 2025-01-16 PROCEDURE — 85025 COMPLETE CBC W/AUTO DIFF WBC: CPT

## 2025-01-16 PROCEDURE — 99285 EMERGENCY DEPT VISIT HI MDM: CPT

## 2025-01-16 PROCEDURE — 96374 THER/PROPH/DIAG INJ IV PUSH: CPT

## 2025-01-16 PROCEDURE — 96375 TX/PRO/DX INJ NEW DRUG ADDON: CPT

## 2025-01-16 PROCEDURE — 84484 ASSAY OF TROPONIN QUANT: CPT

## 2025-01-16 PROCEDURE — 70450 CT HEAD/BRAIN W/O DYE: CPT

## 2025-01-16 PROCEDURE — 83880 ASSAY OF NATRIURETIC PEPTIDE: CPT

## 2025-01-16 PROCEDURE — 71045 X-RAY EXAM CHEST 1 VIEW: CPT

## 2025-01-16 PROCEDURE — 80053 COMPREHEN METABOLIC PANEL: CPT

## 2025-01-16 PROCEDURE — 6360000002 HC RX W HCPCS: Performed by: EMERGENCY MEDICINE

## 2025-01-16 RX ORDER — FUROSEMIDE 10 MG/ML
20 INJECTION INTRAMUSCULAR; INTRAVENOUS ONCE
Status: COMPLETED | OUTPATIENT
Start: 2025-01-16 | End: 2025-01-16

## 2025-01-16 RX ORDER — HYDRALAZINE HYDROCHLORIDE 20 MG/ML
10 INJECTION INTRAMUSCULAR; INTRAVENOUS
Status: COMPLETED | OUTPATIENT
Start: 2025-01-16 | End: 2025-01-16

## 2025-01-16 RX ORDER — HYDRALAZINE HYDROCHLORIDE 20 MG/ML
10 INJECTION INTRAMUSCULAR; INTRAVENOUS
Status: DISCONTINUED | OUTPATIENT
Start: 2025-01-16 | End: 2025-01-16 | Stop reason: HOSPADM

## 2025-01-16 RX ADMIN — FUROSEMIDE 20 MG: 10 INJECTION, SOLUTION INTRAMUSCULAR; INTRAVENOUS at 16:42

## 2025-01-16 RX ADMIN — HYDRALAZINE HYDROCHLORIDE 10 MG: 20 INJECTION INTRAMUSCULAR; INTRAVENOUS at 16:41

## 2025-01-16 ASSESSMENT — LIFESTYLE VARIABLES
HOW OFTEN DO YOU HAVE A DRINK CONTAINING ALCOHOL: NEVER
HOW MANY STANDARD DRINKS CONTAINING ALCOHOL DO YOU HAVE ON A TYPICAL DAY: PATIENT DOES NOT DRINK

## 2025-01-16 ASSESSMENT — PAIN - FUNCTIONAL ASSESSMENT: PAIN_FUNCTIONAL_ASSESSMENT: NONE - DENIES PAIN

## 2025-01-16 NOTE — ED TRIAGE NOTES
Patient states htn at home complaints of generalized h/a. States bp 198's at home. States received covid, flu, and hepatitis vaccination yesterday. Denies n/v/d.

## 2025-01-16 NOTE — ED NOTES
Patient mobility status  with no difficulty.     I have reviewed discharge instructions with the patient.  The patient verbalized understanding.    Patient left ED via Discharge Method: ambulatory to Home with Spouse.    Opportunity for questions and clarification provided.     Patient given 0 scripts.           Racheal Warren RN  01/16/25 0884

## 2025-01-16 NOTE — DISCHARGE INSTRUCTIONS
Schedule close follow-up with [facility] cardiology within 1 week for blood pressure recheck.  Remain compliant with blood pressure medication.  Return to ED if symptoms worsen or progress in any way.    Please return to ED if symptoms worsen or progress in any way.

## 2025-01-16 NOTE — ED PROVIDER NOTES
visit:      Results for orders placed or performed during the hospital encounter of 01/16/25   XR CHEST PORTABLE    Narrative    EXAM: Chest X-ray    INDICATION: Chest Pain    COMPARISON:  None    TECHNIQUE: AP/PA view of the chest was obtained.    FINDINGS: There is minimal increased density along the left lower heart border  and lungs are otherwise clear.. There is upper lobe vascular redistribution,  mild, with borderline cardiomegaly. Descending aorta is mildly prominent.  Costophrenic angles are sharp.    The bony thorax demonstrates dorsal osteophyte in the lateral portion of the  left clavicle probably degenerative or posttraumatic in etiology.      Impression    1. Minimal increased densities along the left lower heart border.  2. Upper lobe vascular redistribution with borderline cardiomegaly consistent  with mild pulmonary venous hypertension/CHF.      Electronically signed by Monster Noel   CT HEAD WO CONTRAST    Narrative    Head CT    INDICATION: dizziness    TECHNIQUE: Multiple 2D axial images obtained through the brain without  intravenous contrast.  Radiation dose reduction techniques were used for this  study:  All CT scans performed at this facility use one or all of the following:  Automated exposure control, adjustment of the mA and/or kVp according to  patient's size, iterative reconstruction.    COMPARISON: None    FINDINGS: No areas of abnormal attenuation are seen in the brain. There is no CT  evidence of acute hemorrhage or infarction. The ventricles are normal in size.  There are no extra-axial fluid collections. No masses are seen. Minimal mucosal  thickening in the right maxillary sinus. There are no bony lesions.      Impression    No CT evidence of acute intracranial abnormality.    Electronically signed by Rizwan Kay   CBC with Auto Differential   Result Value Ref Range    WBC 6.2 4.3 - 11.1 K/uL    RBC 4.71 4.23 - 5.6 M/uL    Hemoglobin 13.9 13.6 - 17.2 g/dL    Hematocrit 41.5  Upper lobe vascular redistribution with borderline cardiomegaly consistent   with mild pulmonary venous hypertension/CHF.         Electronically signed by Monster Nole                   No results for input(s): \"COVID19\" in the last 72 hours.     Voice dictation software was used during the making of this note.  This software is not perfect and grammatical and other typographical errors may be present.  This note has not been completely proofread for errors.     Vipul Cooper Jr., MD  01/17/25 3194

## 2025-01-17 ENCOUNTER — TELEPHONE (OUTPATIENT)
Dept: INTERNAL MEDICINE CLINIC | Facility: CLINIC | Age: 65
End: 2025-01-17

## 2025-01-17 ASSESSMENT — ENCOUNTER SYMPTOMS
SORE THROAT: 0
VOMITING: 0
ABDOMINAL PAIN: 0
NAUSEA: 0
COUGH: 1
BACK PAIN: 0
RHINORRHEA: 1
PHOTOPHOBIA: 0

## 2025-01-17 NOTE — TELEPHONE ENCOUNTER
Called pt, made him aware that he will need to re-establish w/practice before he can have any other appts. Made pt aware of his next appt 2/26/25. Pt voiced understanding.

## 2025-01-17 NOTE — TELEPHONE ENCOUNTER
----- Message from Rohit HUBBARD sent at 1/17/2025 10:04 AM EST -----  Regarding: ECC Appointment Request  ECC Appointment Request    Patient needs appointment for ECC Appointment Type: ED Follow-Up.    Patient Requested Dates(s): January 21, 2025  Patient Requested Time: early as possible in the morning  Provider Name: MathieuderomeoNasreen     Reason for Appointment Request: Established Patient - Available appointments did not meet patient need. Patient needs follow up form emergency room due to blood pressure and heart problem.  --------------------------------------------------------------------------------------------------------------------------    Relationship to Patient: Self     Call Back Information: OK to leave message on voicemail  Preferred Call Back Number: Phone [phone]

## 2025-01-22 NOTE — PROGRESS NOTES
[facility] CARDIOLOGY  [address] [place] [address], [address]  [place] [address]  PHONE: [phone]      25    NAME:  Smooth Borrero  : 1960  MRN: [id number]         SUBJECTIVE:   Smooth Borrero is a 64 y.o. male seen for a follow up visit regarding the following:     Chief Complaint   Patient presents with    Follow-up    Cardiomyopathy    Hypertension            HPI:  Follow up  Follow-up, Cardiomyopathy, and Hypertension   .    Follow up NICM with HFmrEF.   His mild renal insufficiency actually improved with SGLT2i and ARNI.  Prior heavy EtOH use, returns off schedule, having been to ER 25 with HA and BP to 198. P-, Cr 1.4, negative troponin, negative head CT. CXR mild volume overload. Improved with hydralazine 10 mg IV. and lasix 20 mg IV    The day before his ER visit, he had gone to [facility] pharmacy and got his flu shot.  They recommended he get the COVID and hepatitis vaccines so he ended up getting all 3 at once.  [place] ok that night but the next morning, his wife noticed that his eyes were blood shot.  He was feeling sluggish.  He went ahead and took his meds, waited a bit, and then checked his BP seeing it elevated as noted.  His daughter advised him to go the ER around 10 am, he decided to wait.  She checked him again by phone after another 15 minutes and had him check the BP again while she was on the phone with him, still elevated, she again urged him to go to the ER and after a few more attempts and threatening to call EMS he finally went.  Since then he has been checking the BP, forgot to bring his diary because his power went out this morning, but he recalls they've been somewhat labile.  His BP before bedtime last night was good, 137/70.  About 20 minutes after taking his meds this morning it was 170/92.  His daughter has expressed concern that some of the BP elevation recently has been anxiety, he endorses that may be the case.  Hasn't been weighing daily at home but

## 2025-01-23 ENCOUNTER — TELEPHONE (OUTPATIENT)
Age: 65
End: 2025-01-23

## 2025-01-23 ENCOUNTER — OFFICE VISIT (OUTPATIENT)
Age: 65
End: 2025-01-23
Payer: MEDICARE

## 2025-01-23 VITALS
WEIGHT: 252 LBS | HEIGHT: 72 IN | SYSTOLIC BLOOD PRESSURE: 130 MMHG | DIASTOLIC BLOOD PRESSURE: 92 MMHG | BODY MASS INDEX: 34.13 KG/M2 | HEART RATE: 52 BPM

## 2025-01-23 DIAGNOSIS — N18.31 STAGE 3A CHRONIC KIDNEY DISEASE (HCC): ICD-10-CM

## 2025-01-23 DIAGNOSIS — G47.33 OSA (OBSTRUCTIVE SLEEP APNEA): ICD-10-CM

## 2025-01-23 DIAGNOSIS — I16.0 HYPERTENSIVE URGENCY: Primary | ICD-10-CM

## 2025-01-23 DIAGNOSIS — I42.0 DILATED CARDIOMYOPATHY (HCC): ICD-10-CM

## 2025-01-23 DIAGNOSIS — I50.22 CHRONIC SYSTOLIC (CONGESTIVE) HEART FAILURE (HCC): ICD-10-CM

## 2025-01-23 PROCEDURE — G8427 DOCREV CUR MEDS BY ELIG CLIN: HCPCS | Performed by: INTERNAL MEDICINE

## 2025-01-23 PROCEDURE — G8417 CALC BMI ABV UP PARAM F/U: HCPCS | Performed by: INTERNAL MEDICINE

## 2025-01-23 PROCEDURE — 3080F DIAST BP >= 90 MM HG: CPT | Performed by: INTERNAL MEDICINE

## 2025-01-23 PROCEDURE — 3017F COLORECTAL CA SCREEN DOC REV: CPT | Performed by: INTERNAL MEDICINE

## 2025-01-23 PROCEDURE — 3075F SYST BP GE 130 - 139MM HG: CPT | Performed by: INTERNAL MEDICINE

## 2025-01-23 PROCEDURE — 99214 OFFICE O/P EST MOD 30 MIN: CPT | Performed by: INTERNAL MEDICINE

## 2025-01-23 PROCEDURE — 1036F TOBACCO NON-USER: CPT | Performed by: INTERNAL MEDICINE

## 2025-01-23 RX ORDER — LISINOPRIL 20 MG/1
20 TABLET ORAL DAILY
COMMUNITY
End: 2025-01-23 | Stop reason: ALTCHOICE

## 2025-01-23 RX ORDER — FUROSEMIDE 20 MG/1
1 TABLET ORAL
COMMUNITY

## 2025-01-23 RX ORDER — SPIRONOLACTONE 25 MG/1
25 TABLET ORAL DAILY
Qty: 90 TABLET | Refills: 3 | Status: SHIPPED | OUTPATIENT
Start: 2025-01-23

## 2025-01-23 ASSESSMENT — ENCOUNTER SYMPTOMS: SHORTNESS OF BREATH: 0

## 2025-01-23 NOTE — TELEPHONE ENCOUNTER
Called and spoke with patient. He believes it is his deductible causing the medication to cost so much. He is going to come by the office to  patient assistance forms and call his insurance to see if they offer \"smoothing\"

## 2025-01-23 NOTE — TELEPHONE ENCOUNTER
Pt was seen this morning 1/23/25. Called into let Dr. Lind know the medicine she prescribed at today's visit is too expensive. Pt could not tell me what the name of the medication was. He just got a notification from the pharmacy that it was $400.

## 2025-01-28 SDOH — ECONOMIC STABILITY: FOOD INSECURITY: WITHIN THE PAST 12 MONTHS, THE FOOD YOU BOUGHT JUST DIDN'T LAST AND YOU DIDN'T HAVE MONEY TO GET MORE.: OFTEN TRUE

## 2025-01-28 SDOH — ECONOMIC STABILITY: TRANSPORTATION INSECURITY
IN THE PAST 12 MONTHS, HAS LACK OF TRANSPORTATION KEPT YOU FROM MEETINGS, WORK, OR FROM GETTING THINGS NEEDED FOR DAILY LIVING?: NO

## 2025-01-28 SDOH — ECONOMIC STABILITY: INCOME INSECURITY: IN THE LAST 12 MONTHS, WAS THERE A TIME WHEN YOU WERE NOT ABLE TO PAY THE MORTGAGE OR RENT ON TIME?: YES

## 2025-01-28 SDOH — ECONOMIC STABILITY: FOOD INSECURITY: WITHIN THE PAST 12 MONTHS, YOU WORRIED THAT YOUR FOOD WOULD RUN OUT BEFORE YOU GOT MONEY TO BUY MORE.: SOMETIMES TRUE

## 2025-01-28 SDOH — ECONOMIC STABILITY: TRANSPORTATION INSECURITY
IN THE PAST 12 MONTHS, HAS THE LACK OF TRANSPORTATION KEPT YOU FROM MEDICAL APPOINTMENTS OR FROM GETTING MEDICATIONS?: NO

## 2025-01-28 ASSESSMENT — PATIENT HEALTH QUESTIONNAIRE - PHQ9
SUM OF ALL RESPONSES TO PHQ QUESTIONS 1-9: 6
SUM OF ALL RESPONSES TO PHQ QUESTIONS 1-9: 6
5. POOR APPETITE OR OVEREATING: MORE THAN HALF THE DAYS
5. POOR APPETITE OR OVEREATING: MORE THAN HALF THE DAYS
6. FEELING BAD ABOUT YOURSELF - OR THAT YOU ARE A FAILURE OR HAVE LET YOURSELF OR YOUR FAMILY DOWN: SEVERAL DAYS
8. MOVING OR SPEAKING SO SLOWLY THAT OTHER PEOPLE COULD HAVE NOTICED. OR THE OPPOSITE - BEING SO FIDGETY OR RESTLESS THAT YOU HAVE BEEN MOVING AROUND A LOT MORE THAN USUAL: NOT AT ALL
10. IF YOU CHECKED OFF ANY PROBLEMS, HOW DIFFICULT HAVE THESE PROBLEMS MADE IT FOR YOU TO DO YOUR WORK, TAKE CARE OF THINGS AT HOME, OR GET ALONG WITH OTHER PEOPLE: NOT DIFFICULT AT ALL
6. FEELING BAD ABOUT YOURSELF - OR THAT YOU ARE A FAILURE OR HAVE LET YOURSELF OR YOUR FAMILY DOWN: SEVERAL DAYS
7. TROUBLE CONCENTRATING ON THINGS, SUCH AS READING THE NEWSPAPER OR WATCHING TELEVISION: NOT AT ALL
8. MOVING OR SPEAKING SO SLOWLY THAT OTHER PEOPLE COULD HAVE NOTICED. OR THE OPPOSITE, BEING SO FIGETY OR RESTLESS THAT YOU HAVE BEEN MOVING AROUND A LOT MORE THAN USUAL: NOT AT ALL
4. FEELING TIRED OR HAVING LITTLE ENERGY: SEVERAL DAYS
10. IF YOU CHECKED OFF ANY PROBLEMS, HOW DIFFICULT HAVE THESE PROBLEMS MADE IT FOR YOU TO DO YOUR WORK, TAKE CARE OF THINGS AT HOME, OR GET ALONG WITH OTHER PEOPLE: NOT DIFFICULT AT ALL
9. THOUGHTS THAT YOU WOULD BE BETTER OFF DEAD, OR OF HURTING YOURSELF: NOT AT ALL
2. FEELING DOWN, DEPRESSED OR HOPELESS: NOT AT ALL
9. THOUGHTS THAT YOU WOULD BE BETTER OFF DEAD, OR OF HURTING YOURSELF: NOT AT ALL
1. LITTLE INTEREST OR PLEASURE IN DOING THINGS: NOT AT ALL
3. TROUBLE FALLING OR STAYING ASLEEP: MORE THAN HALF THE DAYS
1. LITTLE INTEREST OR PLEASURE IN DOING THINGS: NOT AT ALL
SUM OF ALL RESPONSES TO PHQ QUESTIONS 1-9: 6
SUM OF ALL RESPONSES TO PHQ QUESTIONS 1-9: 6
4. FEELING TIRED OR HAVING LITTLE ENERGY: SEVERAL DAYS
2. FEELING DOWN, DEPRESSED OR HOPELESS: NOT AT ALL
3. TROUBLE FALLING OR STAYING ASLEEP: MORE THAN HALF THE DAYS
7. TROUBLE CONCENTRATING ON THINGS, SUCH AS READING THE NEWSPAPER OR WATCHING TELEVISION: NOT AT ALL
SUM OF ALL RESPONSES TO PHQ9 QUESTIONS 1 & 2: 0
SUM OF ALL RESPONSES TO PHQ QUESTIONS 1-9: 6

## 2025-01-31 ENCOUNTER — OFFICE VISIT (OUTPATIENT)
Dept: INTERNAL MEDICINE CLINIC | Facility: CLINIC | Age: 65
End: 2025-01-31

## 2025-01-31 VITALS
DIASTOLIC BLOOD PRESSURE: 110 MMHG | RESPIRATION RATE: 18 BRPM | BODY MASS INDEX: 34.13 KG/M2 | OXYGEN SATURATION: 96 % | SYSTOLIC BLOOD PRESSURE: 166 MMHG | HEIGHT: 72 IN | HEART RATE: 69 BPM | WEIGHT: 252 LBS | TEMPERATURE: 97 F

## 2025-01-31 DIAGNOSIS — I42.0 DILATED CARDIOMYOPATHY (HCC): ICD-10-CM

## 2025-01-31 DIAGNOSIS — N18.31 TYPE 2 DIABETES MELLITUS WITH STAGE 3A CHRONIC KIDNEY DISEASE, WITHOUT LONG-TERM CURRENT USE OF INSULIN (HCC): ICD-10-CM

## 2025-01-31 DIAGNOSIS — G47.33 OSA (OBSTRUCTIVE SLEEP APNEA): ICD-10-CM

## 2025-01-31 DIAGNOSIS — R35.1 NOCTURIA: ICD-10-CM

## 2025-01-31 DIAGNOSIS — N18.31 STAGE 3A CHRONIC KIDNEY DISEASE (HCC): ICD-10-CM

## 2025-01-31 DIAGNOSIS — F32.9 REACTIVE DEPRESSION: ICD-10-CM

## 2025-01-31 DIAGNOSIS — E55.9 VITAMIN D DEFICIENCY: ICD-10-CM

## 2025-01-31 DIAGNOSIS — K63.5 HYPERPLASTIC COLONIC POLYP, UNSPECIFIED PART OF COLON: ICD-10-CM

## 2025-01-31 DIAGNOSIS — E11.22 TYPE 2 DIABETES MELLITUS WITH STAGE 3A CHRONIC KIDNEY DISEASE, WITHOUT LONG-TERM CURRENT USE OF INSULIN (HCC): ICD-10-CM

## 2025-01-31 DIAGNOSIS — E78.2 MIXED HYPERLIPIDEMIA: ICD-10-CM

## 2025-01-31 DIAGNOSIS — Z76.89 ENCOUNTER TO ESTABLISH CARE WITH NEW DOCTOR: Primary | ICD-10-CM

## 2025-01-31 DIAGNOSIS — M1A.0710 IDIOPATHIC CHRONIC GOUT OF RIGHT ANKLE WITHOUT TOPHUS: ICD-10-CM

## 2025-01-31 DIAGNOSIS — I10 ESSENTIAL HYPERTENSION: ICD-10-CM

## 2025-01-31 DIAGNOSIS — Z11.4 SCREENING FOR HIV (HUMAN IMMUNODEFICIENCY VIRUS): ICD-10-CM

## 2025-01-31 DIAGNOSIS — F41.9 ANXIETY: ICD-10-CM

## 2025-01-31 PROBLEM — E11.9 TYPE 2 DIABETES MELLITUS WITHOUT COMPLICATION, WITHOUT LONG-TERM CURRENT USE OF INSULIN (HCC): Status: RESOLVED | Noted: 2021-05-17 | Resolved: 2025-01-31

## 2025-01-31 PROBLEM — G47.00 PERSISTENT DISORDER OF INITIATING OR MAINTAINING SLEEP: Status: RESOLVED | Noted: 2019-01-28 | Resolved: 2025-01-31

## 2025-01-31 LAB — HBA1C MFR BLD: 6.2 %

## 2025-01-31 RX ORDER — LISINOPRIL 20 MG/1
20 TABLET ORAL DAILY
Qty: 90 TABLET | Refills: 1 | Status: SHIPPED | OUTPATIENT
Start: 2025-01-31

## 2025-01-31 RX ORDER — FLUTICASONE PROPIONATE 50 MCG
SPRAY, SUSPENSION (ML) NASAL
COMMUNITY

## 2025-01-31 RX ORDER — LISINOPRIL 20 MG/1
20 TABLET ORAL DAILY
COMMUNITY
End: 2025-01-31 | Stop reason: SDUPTHER

## 2025-01-31 ASSESSMENT — ENCOUNTER SYMPTOMS
BLOOD IN STOOL: 0
SHORTNESS OF BREATH: 0
RHINORRHEA: 0
ABDOMINAL PAIN: 0
COUGH: 0

## 2025-01-31 NOTE — PROGRESS NOTES
[facility]      2025    Patient Name: Smooth Borrero  :  1960    Subjective:    Chief Complaint:  Chief Complaint   Patient presents with    Other     Type 2 diabetes mellitus without complication, without long-term current use of insulin         HPI Here to re-establish care, has not been seen in our office since ;  he has been seeing Dr. Bowden in the interim;   He has hx of cardiomyopathy, saw Dr. Robbins for hospital f/u last week; was seen in ED 25, , given Hydralazine and Lasix IV; he was out of Entresto and Jardiance, taking old Lisinopril rx and drinking alcohol, eating processed foods prior to ED visit; he was prescribed Jardiance 25 mg qd and Spironolactone 25 mg qd; he is to f/u in 3 months; records reviewed;   He is avoiding alcohol, has not drank any alcohol since he was seen in ED; he states he can not afford the Entresto and did not fill rx;   He has gout, has not had any flare ups lately, taking Allopurinol as prescribed;   He has NYDIA, has not been using CPAP in the past year; he plans to reschedule sleep study that was scheduled this past month;   He has depression and anxiety; he is not presently on medication and feeling well; he is frustrated that he can't do what he used to do;   HTN:  Patient compliant with taking blood pressure medications: Yes  Discussed importance of following low sodium DASH diet, increasing physical activity, taking medications as ordered, decreasing alcohol intake, keeping f/u visits to recheck blood pressure, monitoring blood pressure at home and keeping a log, with goal blood pressure <140/90.  Home bp readings are: 164/90 this am  Diabetes:  Blood sugar readings: does not monitor  Patient compliant with low carbohydrate diet, with occasional dietary indiscretions.  Patient is sedentary and does not exercise.  Regular exercise recommended.  Patient advised on foot care and regular foot exam.  Last eye exam: , has appt

## 2025-02-03 ENCOUNTER — TELEPHONE (OUTPATIENT)
Dept: INTERNAL MEDICINE CLINIC | Facility: CLINIC | Age: 65
End: 2025-02-03

## 2025-02-03 NOTE — TELEPHONE ENCOUNTER
----- Message from Rubina HERNANDEZ sent at 2/3/2025 12:36 PM EST -----  Regarding: ECC Message to Provider  ECC Message to Provider    Relationship to Patient: Self     Additional Information: patient have a lab appointment on February 6, 2025 and he wants to confirm if he still needs to fast or not.  --------------------------------------------------------------------------------------------------------------------------    Call Back Information: OK to leave message on voicemail  Preferred Call Back Number: Phone  [phone]

## 2025-02-03 NOTE — TELEPHONE ENCOUNTER
Pt notified to fast for at least 8 hours prior to his lab appt on the 6th. Pt verbalized understanding.

## 2025-02-27 ENCOUNTER — TELEPHONE (OUTPATIENT)
Dept: INTERNAL MEDICINE CLINIC | Facility: CLINIC | Age: 65
End: 2025-02-27

## 2025-04-28 NOTE — PROGRESS NOTES
[facility] CARDIOLOGY  [address] [place] [address], [address]  [place] [address]  PHONE: [phone]      25    NAME:  Smooth Borrero  : 1960  MRN: [id number]         SUBJECTIVE:   Smooth Borrero is a 64 y.o. male seen for a follow up visit regarding the following:     Chief Complaint   Patient presents with    Follow-up    Cardiomyopathy    Hypertension    Hyperlipidemia            HPI:  Follow up  Follow-up, Cardiomyopathy, Hypertension, and Hyperlipidemia   .    Follow up NICM with HFmrEF.   His mild renal insufficiency actually improved with SGLT2i and ARNI.  Prior heavy EtOH use.    Unfortunately, he didn't fill the Entresto d/t cost, appears to be taking an old Rx of lisinopril.  We provided him with samples and patient assistance application but never received anything back from him.  He says he is taking the Jardiance but ran out 3 weeks ago and hasn't requested a refill.    Hasn't had his follow up echo    Has mild shortness of breath but not bad.  Put in his garden, doing some walking, limited by knee pain.   No chest discomfort.  Has been drinking no more than 3 beers a week and working to quit entirely.  Has noticed his BP has been so well controlled on home monitor, which is confirmed today as accurate, that he didn't believe it at first.          PAST CARDIAC HISTORY:  2014      Reportedly diagnosed with CM  Aug 2019       Stress echo - normal resting EF with normal stress response  Mar-May 2021       COVID PNA - cardiac arrest (non-shockable rhythm with 6 minutes CPR), down for approx 30 minutes)       Echo EF ~ 50%       Echo - EF 40%, mild MR/AI  2021       NST (lauren) - mild distal anterior ischemia, EF 40%       LHC - normal coronaries. EF 50-55%  2023       Echo - EF 42%, LVE, mild/mod MR  Mar 2024       EF 40-45%              Cardiac Medications       ACE Inhibitors       lisinopril (PRINIVIL;ZESTRIL) 20 MG tablet Take 1 tablet by mouth daily       Potassium Sparing

## 2025-04-29 ENCOUNTER — OFFICE VISIT (OUTPATIENT)
Age: 65
End: 2025-04-29
Payer: MEDICARE

## 2025-04-29 VITALS
DIASTOLIC BLOOD PRESSURE: 80 MMHG | WEIGHT: 252 LBS | BODY MASS INDEX: 34.13 KG/M2 | SYSTOLIC BLOOD PRESSURE: 128 MMHG | HEIGHT: 72 IN | HEART RATE: 63 BPM

## 2025-04-29 DIAGNOSIS — I50.22 CHRONIC SYSTOLIC (CONGESTIVE) HEART FAILURE (HCC): ICD-10-CM

## 2025-04-29 DIAGNOSIS — E78.2 MIXED HYPERLIPIDEMIA: ICD-10-CM

## 2025-04-29 DIAGNOSIS — I10 ESSENTIAL HYPERTENSION: ICD-10-CM

## 2025-04-29 DIAGNOSIS — I42.0 DILATED CARDIOMYOPATHY (HCC): Primary | ICD-10-CM

## 2025-04-29 DIAGNOSIS — E11.22 TYPE 2 DIABETES MELLITUS WITH STAGE 3A CHRONIC KIDNEY DISEASE, WITHOUT LONG-TERM CURRENT USE OF INSULIN (HCC): ICD-10-CM

## 2025-04-29 DIAGNOSIS — N18.31 TYPE 2 DIABETES MELLITUS WITH STAGE 3A CHRONIC KIDNEY DISEASE, WITHOUT LONG-TERM CURRENT USE OF INSULIN (HCC): ICD-10-CM

## 2025-04-29 PROCEDURE — 93000 ELECTROCARDIOGRAM COMPLETE: CPT | Performed by: INTERNAL MEDICINE

## 2025-04-29 PROCEDURE — 2022F DILAT RTA XM EVC RTNOPTHY: CPT | Performed by: INTERNAL MEDICINE

## 2025-04-29 PROCEDURE — 1036F TOBACCO NON-USER: CPT | Performed by: INTERNAL MEDICINE

## 2025-04-29 PROCEDURE — G8427 DOCREV CUR MEDS BY ELIG CLIN: HCPCS | Performed by: INTERNAL MEDICINE

## 2025-04-29 PROCEDURE — 3046F HEMOGLOBIN A1C LEVEL >9.0%: CPT | Performed by: INTERNAL MEDICINE

## 2025-04-29 PROCEDURE — 3044F HG A1C LEVEL LT 7.0%: CPT | Performed by: INTERNAL MEDICINE

## 2025-04-29 PROCEDURE — 99214 OFFICE O/P EST MOD 30 MIN: CPT | Performed by: INTERNAL MEDICINE

## 2025-04-29 PROCEDURE — 3017F COLORECTAL CA SCREEN DOC REV: CPT | Performed by: INTERNAL MEDICINE

## 2025-04-29 PROCEDURE — G8417 CALC BMI ABV UP PARAM F/U: HCPCS | Performed by: INTERNAL MEDICINE

## 2025-04-29 PROCEDURE — 3079F DIAST BP 80-89 MM HG: CPT | Performed by: INTERNAL MEDICINE

## 2025-04-29 PROCEDURE — 3074F SYST BP LT 130 MM HG: CPT | Performed by: INTERNAL MEDICINE

## 2025-04-29 ASSESSMENT — ENCOUNTER SYMPTOMS: SHORTNESS OF BREATH: 1

## 2025-05-28 NOTE — PROGRESS NOTES
[facility] CARDIOLOGY  [address] [place] [address], [address]  [place] [address]  PHONE: [phone]      25    NAME:  Smooth Borrero  : 1960  MRN: [id number]         SUBJECTIVE:   Smooth Borrero is a 64 y.o. male seen for a follow up visit regarding the following:     Chief Complaint   Patient presents with    Follow-up    Cardiomyopathy    Hypertension    Hyperlipidemia            HPI:  Follow up  Follow-up, Cardiomyopathy, Hypertension, and Hyperlipidemia   .    Follow up NICM with HFmrEF.   His mild renal insufficiency actually improved with SGLT2i and ARNI.  Prior heavy EtOH use.couldn't get the Entresto d/t cost but his EF has recovered.  He's been working hard on getting healthier.  He's improved his diet, has been resting more, has been using CPAP and feels significantly better.  He's been working daily in his large vegetable garden, has found his mood is much better with better sleep, doing great.               PAST CARDIAC HISTORY:  2014      Reportedly diagnosed with CM  Aug 2019       Stress echo - normal resting EF with normal stress response  Mar-May 2021       COVID PNA - cardiac arrest (non-shockable rhythm with 6 minutes CPR), down for approx 30 minutes)       Echo EF ~ 50%       Echo - EF 40%, mild MR/AI  2021       NST (lauren) - mild distal anterior ischemia, EF 40%       LHC - normal coronaries. EF 50-55%  2023       Echo - EF 42%, LVE, mild/mod MR  Mar 2024       EF 40-45%  May 2025       EF low normal, 50-55%, mild LVH, mild MR                       Cardiac Medications       ACE Inhibitors       lisinopril (PRINIVIL;ZESTRIL) 20 MG tablet Take 1 tablet by mouth daily       Potassium Sparing Diuretics       spironolactone (ALDACTONE) 25 MG tablet Take 1 tablet by mouth daily       Loop Diuretics       furosemide (LASIX) 20 MG tablet Take 1 tablet by mouth Every Day       HMG CoA Reductase Inhibitors       atorvastatin (LIPITOR) 40 MG tablet Take 1 tablet by mouth daily

## 2025-05-29 ENCOUNTER — OFFICE VISIT (OUTPATIENT)
Age: 65
End: 2025-05-29
Payer: MEDICARE

## 2025-05-29 VITALS
WEIGHT: 255 LBS | HEART RATE: 72 BPM | DIASTOLIC BLOOD PRESSURE: 72 MMHG | BODY MASS INDEX: 34.54 KG/M2 | HEIGHT: 72 IN | SYSTOLIC BLOOD PRESSURE: 126 MMHG

## 2025-05-29 DIAGNOSIS — I10 ESSENTIAL HYPERTENSION: ICD-10-CM

## 2025-05-29 DIAGNOSIS — I42.0 DILATED CARDIOMYOPATHY (HCC): Primary | ICD-10-CM

## 2025-05-29 DIAGNOSIS — E78.2 MIXED HYPERLIPIDEMIA: ICD-10-CM

## 2025-05-29 PROCEDURE — G8427 DOCREV CUR MEDS BY ELIG CLIN: HCPCS | Performed by: INTERNAL MEDICINE

## 2025-05-29 PROCEDURE — 3074F SYST BP LT 130 MM HG: CPT | Performed by: INTERNAL MEDICINE

## 2025-05-29 PROCEDURE — 3078F DIAST BP <80 MM HG: CPT | Performed by: INTERNAL MEDICINE

## 2025-05-29 PROCEDURE — 99214 OFFICE O/P EST MOD 30 MIN: CPT | Performed by: INTERNAL MEDICINE

## 2025-05-29 PROCEDURE — G8417 CALC BMI ABV UP PARAM F/U: HCPCS | Performed by: INTERNAL MEDICINE

## 2025-05-29 PROCEDURE — 1036F TOBACCO NON-USER: CPT | Performed by: INTERNAL MEDICINE

## 2025-05-29 PROCEDURE — 3017F COLORECTAL CA SCREEN DOC REV: CPT | Performed by: INTERNAL MEDICINE

## 2025-05-29 RX ORDER — LISINOPRIL 20 MG/1
20 TABLET ORAL DAILY
Qty: 90 TABLET | Refills: 3 | Status: SHIPPED | OUTPATIENT
Start: 2025-05-29

## 2025-05-29 RX ORDER — CARVEDILOL 25 MG/1
25 TABLET ORAL 2 TIMES DAILY WITH MEALS
Qty: 180 TABLET | Refills: 3 | Status: SHIPPED | OUTPATIENT
Start: 2025-05-29

## 2025-05-29 RX ORDER — ATORVASTATIN CALCIUM 40 MG/1
40 TABLET, FILM COATED ORAL DAILY
Qty: 90 TABLET | Refills: 3 | Status: SHIPPED | OUTPATIENT
Start: 2025-05-29

## 2025-05-29 ASSESSMENT — ENCOUNTER SYMPTOMS: SHORTNESS OF BREATH: 0

## 2025-05-29 NOTE — PATIENT INSTRUCTIONS
[unfilled]       Learning About the Mediterranean Diet  What is the Mediterranean diet?     The Mediterranean diet is a style of eating rather than a diet plan. It features foods eaten in [place], [place], [place], and other countries along the [place]. It emphasizes eating foods like fish, fruits, vegetables, beans, high-fiber breads and whole grains, nuts, and olive oil. This style of eating includes limited red meat, cheese, and sweets.  Why choose the Mediterranean diet?  A Mediterranean-style diet may improve heart health. It contains more fat than other heart-healthy diets. But the fats are mainly from nuts, unsaturated oils (such as fish oils and olive oil), and certain nut or seed oils (such as canola, soybean, or flaxseed oil). These fats may help protect the heart and blood vessels.  How can you get started on the Mediterranean diet?  Here are some things you can do to switch to a more Mediterranean way of eating.  What to eat  Eat a variety of fruits and vegetables each day, such as grapes, blueberries, tomatoes, broccoli, peppers, figs, olives, spinach, eggplant, beans, lentils, and chickpeas.  Eat a variety of whole-grain foods each day, such as oats, brown rice, and whole wheat bread, pasta, and couscous.  Eat fish at least 2 times a week. Try tuna, salmon, mackerel, lake trout, herring, or sardines.  Eat moderate amounts of low-fat dairy products, such as milk, cheese, or yogurt.  Eat moderate amounts of poultry and eggs.  Choose healthy (unsaturated) fats, such as nuts, olive oil, and certain nut or seed oils like canola, soybean, and flaxseed.  Limit unhealthy (saturated) fats, such as butter, palm oil, and coconut oil. And limit fats found in animal products, such as meat and dairy products made with whole milk. Try to eat red meat only a few times a month in very small amounts.  Limit sweets and desserts to only a few times a week. This includes sugar-sweetened drinks

## 2025-07-24 ENCOUNTER — TELEPHONE (OUTPATIENT)
Dept: INTERNAL MEDICINE CLINIC | Facility: CLINIC | Age: 65
End: 2025-07-24

## 2025-07-31 ENCOUNTER — TELEPHONE (OUTPATIENT)
Dept: INTERNAL MEDICINE CLINIC | Facility: CLINIC | Age: 65
End: 2025-07-31

## (undated) DEVICE — SYRINGE MED 10ML LUERLOCK TIP W/O SFTY DISP

## (undated) DEVICE — CATHETER DIAG AD 5FR L110CM 145DEG COR GRY HYDRPHLC NYL

## (undated) DEVICE — YANKAUER,BULB TIP,W/O VENT,RIGID,STERILE: Brand: MEDLINE

## (undated) DEVICE — GUIDEWIRE VASC L260CM DIA0.035IN RAD 3MM J TIP L7CM PTFE

## (undated) DEVICE — AIRLIFE™ OXYGEN TUBING 7 FEET (2.1 M) CRUSH RESISTANT OXYGEN TUBING, VINYL TIPPED: Brand: AIRLIFE™

## (undated) DEVICE — ENDOSCOPIC KIT 1.1+ OP4 CA DE 2 GWN AAMI LEVEL 3

## (undated) DEVICE — SYRINGE, LUER SLIP, STERILE, 60ML: Brand: MEDLINE

## (undated) DEVICE — CONNECTOR TBNG OD5-7MM O2 END DISP

## (undated) DEVICE — SINGLE PORT MANIFOLD: Brand: NEPTUNE 2

## (undated) DEVICE — SYRINGE MEDICAL 3ML CLEAR PLASTIC STANDARD NON CONTROL LUERLOCK TIP DISPOSABLE

## (undated) DEVICE — CANNULA NSL ORAL AD FOR CAPNOFLEX CO2 O2 AIRLFE

## (undated) DEVICE — GLIDESHEATH SLENDER STAINLESS STEEL KIT: Brand: GLIDESHEATH SLENDER

## (undated) DEVICE — RADIFOCUS OPTITORQUE ANGIOGRAPHIC CATHETER: Brand: OPTITORQUE

## (undated) DEVICE — GAUZE,SPONGE,4"X4",12PLY,WOVEN,NS,LF: Brand: MEDLINE

## (undated) DEVICE — DEVICE COMPR REG 24 CM VASC BND

## (undated) DEVICE — KENDALL RADIOLUCENT FOAM MONITORING ELECTRODE RECTANGULAR SHAPE: Brand: KENDALL